# Patient Record
Sex: FEMALE | Race: WHITE | NOT HISPANIC OR LATINO | URBAN - METROPOLITAN AREA
[De-identification: names, ages, dates, MRNs, and addresses within clinical notes are randomized per-mention and may not be internally consistent; named-entity substitution may affect disease eponyms.]

---

## 2017-09-11 ENCOUNTER — EMERGENCY (EMERGENCY)
Facility: HOSPITAL | Age: 27
LOS: 1 days | Discharge: HOME | End: 2017-09-11
Attending: EMERGENCY MEDICINE | Admitting: EMERGENCY MEDICINE
Payer: COMMERCIAL

## 2017-09-11 VITALS
WEIGHT: 169.09 LBS | SYSTOLIC BLOOD PRESSURE: 145 MMHG | DIASTOLIC BLOOD PRESSURE: 94 MMHG | TEMPERATURE: 97 F | RESPIRATION RATE: 18 BRPM | HEIGHT: 66 IN | OXYGEN SATURATION: 99 % | HEART RATE: 102 BPM

## 2017-09-11 DIAGNOSIS — Z88.8 ALLERGY STATUS TO OTHER DRUGS, MEDICAMENTS AND BIOLOGICAL SUBSTANCES: ICD-10-CM

## 2017-09-11 DIAGNOSIS — F10.129 ALCOHOL ABUSE WITH INTOXICATION, UNSPECIFIED: ICD-10-CM

## 2017-09-11 DIAGNOSIS — R00.2 PALPITATIONS: ICD-10-CM

## 2017-09-11 PROCEDURE — 93010 ELECTROCARDIOGRAM REPORT: CPT

## 2017-09-11 PROCEDURE — 93005 ELECTROCARDIOGRAM TRACING: CPT

## 2017-09-11 PROCEDURE — 99284 EMERGENCY DEPT VISIT MOD MDM: CPT | Mod: 25

## 2017-09-11 PROCEDURE — 99285 EMERGENCY DEPT VISIT HI MDM: CPT | Mod: 25

## 2017-09-11 NOTE — ED ADULT NURSE NOTE - OBJECTIVE STATEMENT
25 y/o female c/o fast heart beat while at a baseball game tonight. pt reports I felt my heart beating fast and I was SOB, my friend ahs a portabole heart monitor and it said by heart rate was 130. pt resting comfortably in stretcher now, EKG, on continuous cardiac monitor with HR 85. pt had 5 alcoholic drinks prior to arrival. denies n/v/d, LOC, headache, dizziness, fever/chills, numbness/tingling, syncope.

## 2017-09-11 NOTE — ED ADULT TRIAGE NOTE - ARRIVAL INFO ADDITIONAL COMMENTS
Pt c/o rapid heart rate. She states when she checked it with her own device it was 132. In triage it is currently 102. Pt denies sob or chest pain.

## 2017-09-12 VITALS
HEART RATE: 88 BPM | RESPIRATION RATE: 18 BRPM | TEMPERATURE: 97 F | SYSTOLIC BLOOD PRESSURE: 122 MMHG | OXYGEN SATURATION: 100 % | DIASTOLIC BLOOD PRESSURE: 78 MMHG

## 2017-09-12 RX ORDER — SODIUM CHLORIDE 9 MG/ML
1000 INJECTION INTRAMUSCULAR; INTRAVENOUS; SUBCUTANEOUS ONCE
Qty: 0 | Refills: 0 | Status: DISCONTINUED | OUTPATIENT
Start: 2017-09-12 | End: 2017-09-12

## 2017-09-12 NOTE — ED PROVIDER NOTE - ATTENDING CONTRIBUTION TO CARE
25yo M here w/ palpitations in setting of etoh use today. has happened before, already has a cardiologist, had ekg/echo as outpatient that were normal. feels better in ED, had drank water which resolved it. no signs of arrthymia here. normal vs. DC home in NAD with strict return precautions given to f/u his cardiologist

## 2017-09-12 NOTE — ED PROVIDER NOTE - MEDICAL DECISION MAKING DETAILS
etoh intox but clinically sober, ambulated w/steady gait, drinking water, states that was having palpitations earlier but resolved w/po fluids, wanting to go home, fs stable, no signs of trauma , hemodynamically stable

## 2017-09-12 NOTE — ED PROVIDER NOTE - OBJECTIVE STATEMENT
The  pt is a 25 y/o F, who presents to ED, stating "I was out drinking and my heart started racing" earlier. Pt states that had 4-5 cocktails, did not drink water and started to notice her HR to be fast, has drank water since and now feeling better.  Incidentally states that has seen cards and had a normal echo and ekg 2 mon ago. Denies drug use, cp, sob, n/v/d, h/a

## 2018-05-03 ENCOUNTER — EMERGENCY (EMERGENCY)
Facility: HOSPITAL | Age: 28
LOS: 1 days | Discharge: HOME | End: 2018-05-03
Attending: EMERGENCY MEDICINE | Admitting: EMERGENCY MEDICINE
Payer: COMMERCIAL

## 2018-05-03 VITALS
DIASTOLIC BLOOD PRESSURE: 79 MMHG | SYSTOLIC BLOOD PRESSURE: 149 MMHG | OXYGEN SATURATION: 98 % | HEART RATE: 94 BPM | RESPIRATION RATE: 20 BRPM | TEMPERATURE: 99 F

## 2018-05-03 DIAGNOSIS — F10.10 ALCOHOL ABUSE, UNCOMPLICATED: ICD-10-CM

## 2018-05-03 DIAGNOSIS — R06.02 SHORTNESS OF BREATH: ICD-10-CM

## 2018-05-03 DIAGNOSIS — R00.2 PALPITATIONS: ICD-10-CM

## 2018-05-03 DIAGNOSIS — Z88.8 ALLERGY STATUS TO OTHER DRUGS, MEDICAMENTS AND BIOLOGICAL SUBSTANCES: ICD-10-CM

## 2018-05-03 DIAGNOSIS — R50.9 FEVER, UNSPECIFIED: ICD-10-CM

## 2018-05-03 DIAGNOSIS — Z79.899 OTHER LONG TERM (CURRENT) DRUG THERAPY: ICD-10-CM

## 2018-05-03 DIAGNOSIS — F41.9 ANXIETY DISORDER, UNSPECIFIED: ICD-10-CM

## 2018-05-03 DIAGNOSIS — Z98.890 OTHER SPECIFIED POSTPROCEDURAL STATES: Chronic | ICD-10-CM

## 2018-05-03 PROCEDURE — 93010 ELECTROCARDIOGRAM REPORT: CPT

## 2018-05-03 PROCEDURE — 99284 EMERGENCY DEPT VISIT MOD MDM: CPT | Mod: 25

## 2018-05-03 RX ORDER — IBUPROFEN 200 MG
600 TABLET ORAL ONCE
Qty: 0 | Refills: 0 | Status: COMPLETED | OUTPATIENT
Start: 2018-05-03 | End: 2018-05-03

## 2018-05-03 RX ORDER — CLONAZEPAM 1 MG
0 TABLET ORAL
Qty: 0 | Refills: 0 | COMMUNITY

## 2018-05-03 RX ADMIN — SODIUM CHLORIDE 1000 MILLILITER(S): 9 INJECTION INTRAMUSCULAR; INTRAVENOUS; SUBCUTANEOUS at 23:30

## 2018-05-03 RX ADMIN — Medication 600 MILLIGRAM(S): at 23:47

## 2018-05-03 NOTE — ED PROVIDER NOTE - ATTENDING CONTRIBUTION TO CARE
27 yof pw feeling sob and palpitations x a few days.  reports fhx of afib.  pt concern for afib.  pt states increased etoh use and klonipin use 2/2 stress at work but no si or hi.    agree w/ PA, pt ao x 3 but tangential during interview, will check labs, etoh level, screening ekg, reassess    will check CTA given elevated dimer

## 2018-05-03 NOTE — ED ADULT NURSE NOTE - OBJECTIVE STATEMENT
Pt states " I had palpitations and I called my mother's cardiologist (who I saw once and was told that my  heart is perfect), who told me he thinks I am having a panic attack and I should come to the ER." Pt with AOB, slurred speech, A+O x 3, no neurological deficits noted. Denies CP but reports SOB x 2 days. No SOB noted at time of exam. Pt states she is on antibiotics for upper respiratory infection.  Pt admits to drinking but states "I only had one beer at 1:30pm". Safety precautions in place.

## 2018-05-03 NOTE — ED PROVIDER NOTE - CARE PLAN
Principal Discharge DX:	Anxiety  Secondary Diagnosis:	Shortness of breath Principal Discharge DX:	Alcohol abuse  Secondary Diagnosis:	Shortness of breath  Secondary Diagnosis:	Anxiety

## 2018-05-03 NOTE — ED ADULT NURSE NOTE - CHPI ED SYMPTOMS NEG
no fever/no cough/no vomiting/no chest pain/no chills/no dizziness/no back pain/no shortness of breath/no nausea/no syncope/no diaphoresis

## 2018-05-03 NOTE — ED PROVIDER NOTE - PHYSICAL EXAMINATION
somewhat difficult to follow patient in conversation secondary to repeated back to back  tangential conversations, make uop smeared upon patients face and some slurring of speech ( unclear if Klonopin / etoh related or patients baseline conversational style ) no indicators of any other deficits + A&O x 3

## 2018-05-03 NOTE — ED PROVIDER NOTE - OBJECTIVE STATEMENT
patient reports short duration of SOB tonight marred by recent palpitations days / ? weeks and concerns about a fib as mother has a fib + seeking cardiology follow up as well as states on antibiotics for ?uri pathology / toothache and was at another ED today and left after partial work up + History marred by patient's tangential conversation and no clear course towards tonights chief compliant but rather a myriad of complaints within a context of work  related stress and klonopin use / etoh both espoused for tonight perhaps as well marring a clear picture

## 2018-05-03 NOTE — ED PROVIDER NOTE - MEDICAL DECISION MAKING DETAILS
secondary to unclear nature of current complaint and history full set labs and IVF commenced with annalgesics as requested by patient EKG completed secondary to unclear nature of current complaint and history full set labs and IVF commenced with analgesics as requested by patient EKG completed + d dimer and on OCP -> CTA neg for PE + counseled on etoh overuse and mother with patient at d/c aware this likely primary concern + alert and coordinated at d/c

## 2018-05-04 VITALS
DIASTOLIC BLOOD PRESSURE: 60 MMHG | OXYGEN SATURATION: 98 % | TEMPERATURE: 98 F | HEART RATE: 96 BPM | RESPIRATION RATE: 17 BRPM | SYSTOLIC BLOOD PRESSURE: 101 MMHG

## 2018-05-04 PROCEDURE — 80053 COMPREHEN METABOLIC PANEL: CPT

## 2018-05-04 PROCEDURE — 81003 URINALYSIS AUTO W/O SCOPE: CPT

## 2018-05-04 PROCEDURE — 82553 CREATINE MB FRACTION: CPT

## 2018-05-04 PROCEDURE — 99283 EMERGENCY DEPT VISIT LOW MDM: CPT | Mod: 25

## 2018-05-04 PROCEDURE — 71275 CT ANGIOGRAPHY CHEST: CPT | Mod: 26

## 2018-05-04 PROCEDURE — 93005 ELECTROCARDIOGRAM TRACING: CPT

## 2018-05-04 PROCEDURE — 71275 CT ANGIOGRAPHY CHEST: CPT

## 2018-05-04 PROCEDURE — 82550 ASSAY OF CK (CPK): CPT

## 2018-05-04 PROCEDURE — 85379 FIBRIN DEGRADATION QUANT: CPT

## 2018-05-04 PROCEDURE — 84484 ASSAY OF TROPONIN QUANT: CPT

## 2018-05-04 PROCEDURE — 85025 COMPLETE CBC W/AUTO DIFF WBC: CPT

## 2018-05-04 PROCEDURE — 83735 ASSAY OF MAGNESIUM: CPT

## 2018-05-04 PROCEDURE — 36415 COLL VENOUS BLD VENIPUNCTURE: CPT

## 2018-05-04 PROCEDURE — 80307 DRUG TEST PRSMV CHEM ANLYZR: CPT

## 2018-05-04 RX ORDER — SODIUM CHLORIDE 9 MG/ML
1000 INJECTION INTRAMUSCULAR; INTRAVENOUS; SUBCUTANEOUS ONCE
Qty: 0 | Refills: 0 | Status: COMPLETED | OUTPATIENT
Start: 2018-05-04 | End: 2018-05-03

## 2018-05-04 RX ORDER — SODIUM CHLORIDE 9 MG/ML
1000 INJECTION INTRAMUSCULAR; INTRAVENOUS; SUBCUTANEOUS ONCE
Qty: 0 | Refills: 0 | Status: COMPLETED | OUTPATIENT
Start: 2018-05-04 | End: 2018-05-04

## 2018-05-04 RX ORDER — TAMSULOSIN HYDROCHLORIDE 0.4 MG/1
0.4 CAPSULE ORAL ONCE
Qty: 0 | Refills: 0 | Status: DISCONTINUED | OUTPATIENT
Start: 2018-05-04 | End: 2018-05-04

## 2018-05-04 RX ADMIN — Medication 600 MILLIGRAM(S): at 00:31

## 2018-05-04 RX ADMIN — SODIUM CHLORIDE 1000 MILLILITER(S): 9 INJECTION INTRAMUSCULAR; INTRAVENOUS; SUBCUTANEOUS at 00:45

## 2018-05-04 NOTE — ED ADULT NURSE REASSESSMENT NOTE - NS ED NURSE REASSESS COMMENT FT1
Pt resting. calm, in stable condition. Denies CP/Papitations at this time. CT scan completed. Awaiting further eval and dispo. Close monitoring continues.

## 2018-06-29 ENCOUNTER — EMERGENCY (EMERGENCY)
Facility: HOSPITAL | Age: 28
LOS: 1 days | Discharge: LEFT BEFORE DISCHARGE | End: 2018-06-29
Attending: EMERGENCY MEDICINE | Admitting: EMERGENCY MEDICINE
Payer: COMMERCIAL

## 2018-06-29 VITALS
OXYGEN SATURATION: 99 % | TEMPERATURE: 98 F | DIASTOLIC BLOOD PRESSURE: 90 MMHG | HEART RATE: 112 BPM | SYSTOLIC BLOOD PRESSURE: 135 MMHG | RESPIRATION RATE: 18 BRPM | WEIGHT: 169.98 LBS

## 2018-06-29 DIAGNOSIS — R11.2 NAUSEA WITH VOMITING, UNSPECIFIED: ICD-10-CM

## 2018-06-29 DIAGNOSIS — R10.31 RIGHT LOWER QUADRANT PAIN: ICD-10-CM

## 2018-06-29 DIAGNOSIS — Z88.8 ALLERGY STATUS TO OTHER DRUGS, MEDICAMENTS AND BIOLOGICAL SUBSTANCES: ICD-10-CM

## 2018-06-29 DIAGNOSIS — R11.10 VOMITING, UNSPECIFIED: ICD-10-CM

## 2018-06-29 DIAGNOSIS — R14.0 ABDOMINAL DISTENSION (GASEOUS): ICD-10-CM

## 2018-06-29 DIAGNOSIS — Z98.890 OTHER SPECIFIED POSTPROCEDURAL STATES: Chronic | ICD-10-CM

## 2018-06-29 DIAGNOSIS — K59.00 CONSTIPATION, UNSPECIFIED: ICD-10-CM

## 2018-06-29 DIAGNOSIS — Z79.899 OTHER LONG TERM (CURRENT) DRUG THERAPY: ICD-10-CM

## 2018-06-29 PROCEDURE — 99283 EMERGENCY DEPT VISIT LOW MDM: CPT | Mod: 25

## 2018-06-29 PROCEDURE — 99282 EMERGENCY DEPT VISIT SF MDM: CPT

## 2018-06-29 RX ORDER — ONDANSETRON 8 MG/1
4 TABLET, FILM COATED ORAL ONCE
Qty: 0 | Refills: 0 | Status: DISCONTINUED | OUTPATIENT
Start: 2018-06-29 | End: 2018-07-03

## 2018-06-29 RX ORDER — FAMOTIDINE 10 MG/ML
20 INJECTION INTRAVENOUS ONCE
Qty: 0 | Refills: 0 | Status: DISCONTINUED | OUTPATIENT
Start: 2018-06-29 | End: 2018-07-03

## 2018-06-29 RX ORDER — SODIUM CHLORIDE 9 MG/ML
1000 INJECTION INTRAMUSCULAR; INTRAVENOUS; SUBCUTANEOUS ONCE
Qty: 0 | Refills: 0 | Status: DISCONTINUED | OUTPATIENT
Start: 2018-06-29 | End: 2018-07-03

## 2018-06-29 NOTE — ED ADULT NURSE NOTE - CHPI ED SYMPTOMS NEG
no abdominal distension/no hematuria/no blood in stool/no chills/no diarrhea/no fever/no burning urination/no dysuria

## 2018-06-29 NOTE — ED ADULT NURSE REASSESSMENT NOTE - NS ED NURSE REASSESS COMMENT FT1
After evaluation by PA, pt refuses blood draw, and prefers to follow up w/ primary care and her GI doctors. Pt. has no IV ernie, gathers her belongings and leaves the ED at this time. Pt ambulates steady

## 2018-06-29 NOTE — ED ADULT TRIAGE NOTE - ARRIVAL INFO ADDITIONAL COMMENTS
pt c/o n/v since march intermittently and then it worsened and has had pain.  today pt developed rlq pain with worsening vomiting.

## 2018-06-30 NOTE — ED PROVIDER NOTE - OBJECTIVE STATEMENT
28 yo female in the Er c/o nausea, vomiting and abdominal pain. Pt reports she was at the [employer] and felt very nauseous , vomit multiple times, also c/o lower abdominal pain, mostly to her right side. Pt reports she has chronic issues with her GI tract- frequent vomiting and frequent abdominal pain, frequent constipations. Pt has BM usually once a week. Pt denies h/o ovarian cyst, denies any irregular menstruation, pt didn't f/u with any GI doctor yet, but had multiple urgent care visits and ER visits for the same complaints.

## 2018-06-30 NOTE — ED PROVIDER NOTE - MEDICAL DECISION MAKING DETAILS
28 yo female in the Er due to constipations, abdominal pain, s/p multiple episodes of vomiting tonight. pt mentioned that her symptoms started few month ago. Plan : labs, IVF, antiemetics, mostlikely will need CT, as abdomen significantly tender to RLQ.

## 2018-12-20 NOTE — ED PROVIDER NOTE - TOBACCO USE
Received patient today alert and oriented x3 and is having minimal pain. Pain medication given as ordered. Abdominal incision CDI, dressing in place. Patient tolerating full liquid diet today having minimal nausea. Diet advanced to low fiber.  patient tere Unknown if ever smoked

## 2020-02-01 ENCOUNTER — EMERGENCY (EMERGENCY)
Facility: HOSPITAL | Age: 30
LOS: 1 days | Discharge: HOME | End: 2020-02-01
Admitting: EMERGENCY MEDICINE

## 2020-02-01 VITALS
TEMPERATURE: 98 F | OXYGEN SATURATION: 99 % | HEIGHT: 65 IN | SYSTOLIC BLOOD PRESSURE: 146 MMHG | RESPIRATION RATE: 18 BRPM | WEIGHT: 169.98 LBS | DIASTOLIC BLOOD PRESSURE: 95 MMHG | HEART RATE: 86 BPM
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
## 2020-02-01 DIAGNOSIS — Z98.890 OTHER SPECIFIED POSTPROCEDURAL STATES: Chronic | ICD-10-CM
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
## 2020-02-01 DIAGNOSIS — S09.90XA UNSPECIFIED INJURY OF HEAD, INITIAL ENCOUNTER: ICD-10-CM
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
## 2020-02-01 NOTE — ED ADULT TRIAGE NOTE - CHIEF COMPLAINT QUOTE
s/p mechanical fall at 3:30pm today while ice skating, states fell on right side of head on synthetic ice, denies LOC or blood thinners, no nausea/vomititng, no neuro defiicts, hematoma noted to right side of head, ice pack applied and Alleve taken prior to arrival to ED.

## 2020-02-01 NOTE — ED ADULT NURSE REASSESSMENT NOTE - NS ED NURSE REASSESS COMMENT FT1
Patient after ED triage was all set to go inside [facility] when started arguing w/ her mother, stated did not want her mother to come in ED with her.  Mother insisted on accompanying patient to ED so patient seen walking out of ED from triage.  Attempts made to locate patient.  Patient left in stable condition, vital signs stable.

## 2020-02-02 PROBLEM — F41.9 ANXIETY DISORDER, UNSPECIFIED: Chronic | Status: ACTIVE | Noted: 2018-05-03

## 2020-03-18 ENCOUNTER — INPATIENT (INPATIENT)
Facility: HOSPITAL | Age: 30
LOS: 8 days | Discharge: HOME | DRG: 870 | End: 2020-03-27
Attending: HOSPITALIST | Admitting: INTERNAL MEDICINE
Payer: COMMERCIAL

## 2020-03-18 VITALS
TEMPERATURE: 101 F | RESPIRATION RATE: 20 BRPM | SYSTOLIC BLOOD PRESSURE: 95 MMHG | DIASTOLIC BLOOD PRESSURE: 58 MMHG | HEART RATE: 105 BPM | OXYGEN SATURATION: 100 % | WEIGHT: 139.99 LBS

## 2020-03-18 DIAGNOSIS — B97.29 OTHER CORONAVIRUS AS THE CAUSE OF DISEASES CLASSIFIED ELSEWHERE: ICD-10-CM

## 2020-03-18 DIAGNOSIS — Z78.1 PHYSICAL RESTRAINT STATUS: ICD-10-CM

## 2020-03-18 DIAGNOSIS — J12.89 OTHER VIRAL PNEUMONIA: ICD-10-CM

## 2020-03-18 DIAGNOSIS — J96.01 ACUTE RESPIRATORY FAILURE WITH HYPOXIA: ICD-10-CM

## 2020-03-18 DIAGNOSIS — F41.9 ANXIETY DISORDER, UNSPECIFIED: ICD-10-CM

## 2020-03-18 DIAGNOSIS — J10.1 INFLUENZA DUE TO OTHER IDENTIFIED INFLUENZA VIRUS WITH OTHER RESPIRATORY MANIFESTATIONS: ICD-10-CM

## 2020-03-18 DIAGNOSIS — Z98.890 OTHER SPECIFIED POSTPROCEDURAL STATES: Chronic | ICD-10-CM

## 2020-03-18 DIAGNOSIS — Z88.1 ALLERGY STATUS TO OTHER ANTIBIOTIC AGENTS STATUS: ICD-10-CM

## 2020-03-18 DIAGNOSIS — Z87.01 PERSONAL HISTORY OF PNEUMONIA (RECURRENT): ICD-10-CM

## 2020-03-18 DIAGNOSIS — Z79.899 OTHER LONG TERM (CURRENT) DRUG THERAPY: ICD-10-CM

## 2020-03-18 DIAGNOSIS — A41.9 SEPSIS, UNSPECIFIED ORGANISM: ICD-10-CM

## 2020-03-18 DIAGNOSIS — A41.89 OTHER SPECIFIED SEPSIS: ICD-10-CM

## 2020-03-18 LAB
CRP SERPL-MCNC: 34.97 MG/DL — HIGH (ref 0–0.4)
ERYTHROCYTE [SEDIMENTATION RATE] IN BLOOD: 20 MM/HR — HIGH
FERRITIN SERPL-MCNC: 407 NG/ML — HIGH (ref 15–150)
GAS PNL BLDV: SIGNIFICANT CHANGE UP
GLUCOSE BLDC GLUCOMTR-MCNC: 93 MG/DL — SIGNIFICANT CHANGE UP (ref 70–99)
LDH SERPL L TO P-CCNC: 372 U/L — HIGH (ref 50–242)
PROCALCITONIN SERPL-MCNC: 9.62 NG/ML — HIGH (ref 0.02–0.1)
RAPID RVP RESULT: SIGNIFICANT CHANGE UP
TRIGL SERPL-MCNC: 136 MG/DL — SIGNIFICANT CHANGE UP (ref 10–149)

## 2020-03-18 PROCEDURE — 71045 X-RAY EXAM CHEST 1 VIEW: CPT | Mod: 26

## 2020-03-18 PROCEDURE — 99291 CRITICAL CARE FIRST HOUR: CPT

## 2020-03-18 RX ORDER — VANCOMYCIN HCL 1 G
1000 VIAL (EA) INTRAVENOUS ONCE
Refills: 0 | Status: COMPLETED | OUTPATIENT
Start: 2020-03-18 | End: 2020-03-18

## 2020-03-18 RX ORDER — DEXTROSE 50 % IN WATER 50 %
25 SYRINGE (ML) INTRAVENOUS ONCE
Refills: 0 | Status: DISCONTINUED | OUTPATIENT
Start: 2020-03-18 | End: 2020-03-26

## 2020-03-18 RX ORDER — DEXTROSE 50 % IN WATER 50 %
15 SYRINGE (ML) INTRAVENOUS ONCE
Refills: 0 | Status: DISCONTINUED | OUTPATIENT
Start: 2020-03-18 | End: 2020-03-26

## 2020-03-18 RX ORDER — SODIUM CHLORIDE 9 MG/ML
1000 INJECTION INTRAMUSCULAR; INTRAVENOUS; SUBCUTANEOUS ONCE
Refills: 0 | Status: COMPLETED | OUTPATIENT
Start: 2020-03-18 | End: 2020-03-18

## 2020-03-18 RX ORDER — SODIUM CHLORIDE 9 MG/ML
1000 INJECTION, SOLUTION INTRAVENOUS
Refills: 0 | Status: DISCONTINUED | OUTPATIENT
Start: 2020-03-18 | End: 2020-03-26

## 2020-03-18 RX ORDER — CHLORHEXIDINE GLUCONATE 213 G/1000ML
1 SOLUTION TOPICAL
Refills: 0 | Status: DISCONTINUED | OUTPATIENT
Start: 2020-03-18 | End: 2020-03-25

## 2020-03-18 RX ORDER — PIPERACILLIN AND TAZOBACTAM 4; .5 G/20ML; G/20ML
3.38 INJECTION, POWDER, LYOPHILIZED, FOR SOLUTION INTRAVENOUS ONCE
Refills: 0 | Status: COMPLETED | OUTPATIENT
Start: 2020-03-18 | End: 2020-03-18

## 2020-03-18 RX ORDER — HYDROXYCHLOROQUINE SULFATE 200 MG
400 TABLET ORAL EVERY 12 HOURS
Refills: 0 | Status: COMPLETED | OUTPATIENT
Start: 2020-03-18 | End: 2020-03-20

## 2020-03-18 RX ORDER — ONDANSETRON 8 MG/1
4 TABLET, FILM COATED ORAL ONCE
Refills: 0 | Status: COMPLETED | OUTPATIENT
Start: 2020-03-18 | End: 2020-03-18

## 2020-03-18 RX ORDER — ENOXAPARIN SODIUM 100 MG/ML
40 INJECTION SUBCUTANEOUS EVERY 24 HOURS
Refills: 0 | Status: DISCONTINUED | OUTPATIENT
Start: 2020-03-18 | End: 2020-03-27

## 2020-03-18 RX ORDER — TOCILIZUMAB 20 MG/ML
500 INJECTION, SOLUTION, CONCENTRATE INTRAVENOUS ONCE
Refills: 0 | Status: COMPLETED | OUTPATIENT
Start: 2020-03-18 | End: 2020-03-18

## 2020-03-18 RX ORDER — GLUCAGON INJECTION, SOLUTION 0.5 MG/.1ML
1 INJECTION, SOLUTION SUBCUTANEOUS ONCE
Refills: 0 | Status: DISCONTINUED | OUTPATIENT
Start: 2020-03-18 | End: 2020-03-26

## 2020-03-18 RX ORDER — ALBUTEROL 90 UG/1
2 AEROSOL, METERED ORAL ONCE
Refills: 0 | Status: COMPLETED | OUTPATIENT
Start: 2020-03-18 | End: 2020-03-18

## 2020-03-18 RX ORDER — ACETAMINOPHEN 500 MG
1000 TABLET ORAL ONCE
Refills: 0 | Status: COMPLETED | OUTPATIENT
Start: 2020-03-18 | End: 2020-03-18

## 2020-03-18 RX ORDER — DEXTROSE 50 % IN WATER 50 %
12.5 SYRINGE (ML) INTRAVENOUS ONCE
Refills: 0 | Status: DISCONTINUED | OUTPATIENT
Start: 2020-03-18 | End: 2020-03-26

## 2020-03-18 RX ORDER — HYDROXYCHLOROQUINE SULFATE 200 MG
200 TABLET ORAL EVERY 12 HOURS
Refills: 0 | Status: DISCONTINUED | OUTPATIENT
Start: 2020-03-19 | End: 2020-03-20

## 2020-03-18 RX ORDER — INFLUENZA VIRUS VACCINE 15; 15; 15; 15 UG/.5ML; UG/.5ML; UG/.5ML; UG/.5ML
0.5 SUSPENSION INTRAMUSCULAR ONCE
Refills: 0 | Status: DISCONTINUED | OUTPATIENT
Start: 2020-03-18 | End: 2020-03-27

## 2020-03-18 RX ORDER — PIPERACILLIN AND TAZOBACTAM 4; .5 G/20ML; G/20ML
3.38 INJECTION, POWDER, LYOPHILIZED, FOR SOLUTION INTRAVENOUS EVERY 6 HOURS
Refills: 0 | Status: DISCONTINUED | OUTPATIENT
Start: 2020-03-19 | End: 2020-03-23

## 2020-03-18 RX ORDER — INSULIN LISPRO 100/ML
VIAL (ML) SUBCUTANEOUS EVERY 6 HOURS
Refills: 0 | Status: DISCONTINUED | OUTPATIENT
Start: 2020-03-18 | End: 2020-03-25

## 2020-03-18 RX ADMIN — Medication 250 MILLIGRAM(S): at 18:45

## 2020-03-18 RX ADMIN — PIPERACILLIN AND TAZOBACTAM 200 GRAM(S): 4; .5 INJECTION, POWDER, LYOPHILIZED, FOR SOLUTION INTRAVENOUS at 18:05

## 2020-03-18 RX ADMIN — ALBUTEROL 2 PUFF(S): 90 AEROSOL, METERED ORAL at 16:55

## 2020-03-18 RX ADMIN — SODIUM CHLORIDE 1000 MILLILITER(S): 9 INJECTION INTRAMUSCULAR; INTRAVENOUS; SUBCUTANEOUS at 18:40

## 2020-03-18 RX ADMIN — Medication 1000 MILLIGRAM(S): at 17:09

## 2020-03-18 RX ADMIN — Medication 1000 MILLIGRAM(S): at 18:45

## 2020-03-18 RX ADMIN — TOCILIZUMAB 100 MILLIGRAM(S): 20 INJECTION, SOLUTION, CONCENTRATE INTRAVENOUS at 20:50

## 2020-03-18 RX ADMIN — ENOXAPARIN SODIUM 40 MILLIGRAM(S): 100 INJECTION SUBCUTANEOUS at 20:59

## 2020-03-18 NOTE — ED PROVIDER NOTE - OBJECTIVE STATEMENT
28 y/o F pt with no pertinent PMHx and PSHx presents to ED c/o shortness of breath, chest tightness, fever, generalized malaise, and fatigue since last week. Pt states she was seen at urgent care 3 days ago and was diagnosed with flu and pneumonia at that time; pt was started on Tamiflu and Levaquin. Since then, pt relates concern over worsening symptoms and no improvement despite treatment. Pt denies any other acute complaints. 28 y/o F pt with  h/o  pneumonia  in the past ( about 4-5 times)  presents to ED c/o shortness of breath, chest tightness, fever, generalized malaise, and fatigue since last week. Pt states she was seen at the urgent care 3 days ago and was diagnosed with flu A and pneumonia. pt started on Tamiflu and Levaquin, Albuterol nebs. Since then, pt relates concern over worsening symptoms and no improvement despite treatment. Temperature spiked high today, as well as breathing became more labored. Pt denies any other acute complaints.

## 2020-03-18 NOTE — ED PROVIDER NOTE - CLINICAL SUMMARY MEDICAL DECISION MAKING FREE TEXT BOX
28 y/o F pt presents to ED with recent flu and pneumonia diagnosis with worsening symptoms. Pt febrile, in respiratory distress, and is tachypneic in ED, sepsis work up done, CXR pending, and ICU called for consult. Clinical finding suspicious for COVID-19 infection/ pneumonia. 30 y/o F recently diagnosed with Flu-A+ and pneumonia  presents to ED with worsening symptoms. Pt febrile, ill appearing, tachypneic, initial O2 sat-91% on RA, labored breathing+. Sepsis work up done, CXR -b/l patchy pneumonia, concerning for COVID-19. ICU called for consult. pt seen and evaluated by ICU team, admission to MICU recommended for further management.

## 2020-03-18 NOTE — H&P ADULT - HISTORY OF PRESENT ILLNESS
Ms. Gudino is a 29 year old female with a past medical history of recurrent pneumonia who presents with fever, cough, and shortness of breath. On 3/16, she was seen by [facility] for fever, chills, and nonproductive cough. She was Flu-A positive and was noted to have a RUL consolidation on CXR. In addition, she was swabbed for COVID at this time. She was sent home on tamilfu, levaquin, and albuterol, and she was instructed to self-quarantine at home. Her myalgias and chills improved, but continued to have fevers as high as 103 oral today (3/18), which prompted her to report to [facility] ED. She denies any sick/COVID-positive contacts, recent travel,  underlying lung or autoimmune disease, or significant smoking history. She denies chest pain, headaches, diarrhea, vomiting, or abdominal pain. She states her shortness of breath has worsened since arriving in ED. She did not receive the influenza vaccine due to unclear reaction in past.     In the ED, her vital signs were notable for a fever to 100.6 F, BP of 95/58, , RR of 20, and a O2 saturation of 100% on rom air. CBC was notable for a bandemia without elevation in WBC(8.29). VBG showed pH of 7.29, pCO2 of 37, and pO2 of 19, with HCO3 of 18. CXR showed diffuse pulmonary infiltrates on the right and patchy infiltrates. She was given one dose of levaquin 750 mg, APAP 1 g, Vancomycin 1 g, Zosyn 3.375 g, and 1 L of NaCL. She was admitted to the MICU for acute hypoxic respiratory distress 2/2 influenza A vs. post-viral PNA vs. COVID infection.

## 2020-03-18 NOTE — H&P ADULT - NSHPLABSRESULTS_GEN_ALL_CORE
LABS:                         13.1   8.29  )-----------( 226      ( 18 Mar 2020 16:39 )             39.9     03-18    135  |  102  |  14  ----------------------------<  123<H>  3.8   |  19<L>  |  1.02    Ca    8.6      18 Mar 2020 16:39    TPro  6.0  /  Alb  3.0<L>  /  TBili  0.4  /  DBili  x   /  AST  40  /  ALT  31  /  AlkPhos  46  03-18    PT/INR - ( 18 Mar 2020 16:39 )   PT: 15.7 sec;   INR: 1.36          PTT - ( 18 Mar 2020 16:39 )  PTT:36.2 sec          Lactate, Blood: 1.8 mmol/L (03-18 @ 16:38)      RADIOLOGY, EKG & ADDITIONAL TESTS: Reviewed.

## 2020-03-18 NOTE — H&P ADULT - NSHPPHYSICALEXAM_GEN_ALL_CORE
VITAL SIGNS:  T(C): 37.4 (03-18-20 @ 18:40), Max: 39.4 (03-18-20 @ 16:40)  T(F): 99.3 (03-18-20 @ 18:40), Max: 102.9 (03-18-20 @ 16:40)  HR: 99 (03-18-20 @ 18:40) (90 - 105)  BP: 101/59 (03-18-20 @ 18:40) (95/58 - 111/59)  RR: 20 (03-18-20 @ 18:40) (20 - 22)  SpO2: 99% (03-18-20 @ 18:40) (97% - 100%)    PHYSICAL EXAM:    Constitutional: WDWN, lying comfortably in bed, NAD  Head: Nc/At  Eyes: PERRL, EOMI, clear conjunctiva  ENT: no nasal discharge; uvula midline, no oropharyngeal erythema or exudates; MMM  Neck: supple; no JVD or thyromegaly  Respiratory: CTA b/l, no wheezes, rales, or rhonchi  Cardiac: +S1/S2, +RRR, no murmurs, rubs, or gallops  Gastrointestinal: soft, non-tender, non-distended, no rebound/guarding, no palpable masses, normoactive bowel sounds x4  Back: spine midline, no bony tenderness or step-offs; no CVAT B/L  Extremities: WWP, no clubbing or cyanosis, no peripheral edema  Musculoskeletal: NROM x4; no joint swelling, tenderness or erythema  Vascular: 2+ radial and DP pulses b/l  Dermatologic: skin warm, dry and intact, no rashes, wounds, or scars  Lymphatic: no submandibular or cervical LAD  Neurologic: AAOx3, CNII-XII grossly intact, no focal deficits  Psychiatric: affect and characteristics of appearance, verbalizations, behaviors are appropriate VITAL SIGNS:  T(C): 37.4 (03-18-20 @ 18:40), Max: 39.4 (03-18-20 @ 16:40)  T(F): 99.3 (03-18-20 @ 18:40), Max: 102.9 (03-18-20 @ 16:40)  HR: 99 (03-18-20 @ 18:40) (90 - 105)  BP: 101/59 (03-18-20 @ 18:40) (95/58 - 111/59)  RR: 20 (03-18-20 @ 18:40) (20 - 22)  SpO2: 99% (03-18-20 @ 18:40) (97% - 100%)    PHYSICAL EXAM:    Constitutional: Sitting in bed with nonbreather, mild distress  HEENT: Nc/At, EOMI, PERRLA, no scleral icterus, MMM  Neck: supple; no JVD or thyromegaly  Respiratory: Diminished breath sounds at right base and right middle lobe with crackles, decreased breath sounds on left,  no wheezing, increased work of breathing     Cardiac: +S1/S2, +RRR, no murmurs, rubs, or gallops  Gastrointestinal: soft, non-tender, non-distended, normoactive bowel sounds  Extremities: WWP, no clubbing or cyanosis, no peripheral edema  Musculoskeletal: NROM x4; no joint swelling, tenderness or erythema  Vascular: 2+ radial and DP pulses b/l  Dermatologic: skin warm, dry and intact, no rashes, wounds, or scars  Lymphatic: no submandibular or cervical LAD  Neurologic: AAOx3, CNII-XII grossly intact, no focal deficits  Psychiatric: affect and characteristics of appearance, verbalizations, behaviors are appropriate

## 2020-03-18 NOTE — ED PROVIDER NOTE - RESPIRATORY, MLM
Tachypneic, labored breathing, bilateral rales, no wheezes. Tachypneic, labored breathing, bilateral rales, some wheezes.

## 2020-03-18 NOTE — H&P ADULT - ASSESSMENT
Ms. Gudino is a 29 year old female with a past medical history of recurrent pneumonia who presents with fever, cough, and shortness of breath. She was admitted to the MICU for acute hypoxic respiratory distress 2/2 influenza A vs. post-viral PNA vs. COVID infection. Ms. Gudino is a 29 year old female with a past medical history of recurrent pneumonia who presents with fever, cough, and shortness of breath. She was admitted to the MICU for acute hypoxic respiratory distress 2/2 influenza A vs. post-viral PNA vs. COVID infection.    Pulmonary:  #Sepsis 2/2 COVID+  - on arrival meeting 3/4 SIRS criteria (RUL and possible LLL) with pulmonary source  - WBC  with % neutrophils,  % lymphocytes, lactate  - s/p vanc+zosyn, levaquin  750 mg, 1 g APAP 1 L NS bolus in ED  - If patient not intubated, continue tylenol 975 mg q6h standing to mediate cytokine storm  - f/u T cell subset  - obtain HIV consent for testing when able  - UA , f/u UCx  - f/u BCx    #COVID+  - ID consulted, f/u recs  - hydroxychloroquine 400mg q12h x2 doses then 200mg q12h (ID approved)  - Kaletra no longer available  - tociluzumab 560mg IV x 1 dose (ID approved)  - daily CBC/CMP/Mg/Phos/CRP  - q3d labs: ESR, Tcell subset, d-dimer, LDH, ferritin, CK, trop, coags  - COVID isolation protocol  - obtain consent for HIV testing when able    #Hypoxic respiratory failure 2/2 ARDS 2/2 COVID+  - CXR on arrival with BL multilobar interstitial opacities c/w ARDS 2/2 COVID+  - O2sat 100% RA with improvement   - ABG on arrival: pH 7.29, pCO2 37, pO2 19, HCO3 18  - repeat ABG post-intubation   - continue to trend ABG with vent adjustments    #Impending ARDS  - as above  - anticipate intubation once patient arrives on floor  - low threshold to prone pt in order to improve oxygenation and secretion drainage  - continue to closely monitor pt    Cardiovascular:  #Hypotension  - Currently maintaining MAP >65  - Anticipate pressure support with likely intubation    Neurology:  #Sedation  - pt currently on fentanyl gtt and propofol gtt   - RASS goal -4    Gastrointestinal:  #Prophylaxis  - protonix 40mg IV daily while intubated    Genitourinary:  #Decreased urine output  - jackman placed 3/13  - continue to monitor strict I&O  - UA   - Cr     ID:  #Sepsis 2/2 COVID+  - as above  - UA   - f/u BCx, UCx  - ID consulted, f/u recs    F:   E: Replete K<4, Mg<2  N: NPO   G: protonix 40mg IV daily    Code: FULL CODE    Dispo: Patient requires continued monitoring in MICU Ms. Gudino is a 29 year old female with a past medical history of recurrent pneumonia who presents with fever, cough, and shortness of breath. She was admitted to the MICU for acute hypoxic respiratory distress 2/2 influenza A vs. post-viral PNA vs. COVID infection.    Pulmonary:  #Sepsis 2/2 COVID+ vs. lobar PNA vs. Influenza  - Flu A positive at outside clinic on tamiflu and Levaquin  - on arrival meeting 3/4 SIRS criteria (RUL and possible LLL) with pulmonary source  - WBC 8.29  with 54.8% neutrophils, 4.4% lymphocytes, lactate 1.8  - s/p vanc+zosyn, levaquin  750 mg, 1 g APAP 1 L NS bolus in ED  - If patient not intubated, continue Tylenol 975 mg q6h standing to mediate cytokine storm  - f/u T cell subset  - obtain HIV consent for testing when able  - UA , f/u UCx  - f/u BCx    #COVID+  - ID consulted, f/u recs  - hydroxychloroquine 400mg q12h x2 doses then 200mg q12h (ID approved)  - Kaletra no longer available  - tociluzumab 560mg IV x 1 dose (ID approved)  - daily CBC/CMP/Mg/Phos/CRP  - q3d labs: ESR, Tcell subset, d-dimer, LDH, ferritin, CK, trop, coags  - COVID isolation protocol  - obtain consent for HIV testing when able    #Hypoxic respiratory failure 2/2 ARDS 2/2 COVID+  - CXR on arrival with BL multilobar interstitial opacities c/w ARDS 2/2 COVID+  - O2sat 100% RA, BiPap  - ABG on arrival: pH 7.29, pCO2 37, pO2 19, HCO3 18  - repeat ABG post-intubation   - continue to trend ABG with vent adjustments    #Impending ARDS  - as above  - anticipate intubation once patient arrives on floor  - low threshold to prone pt in order to improve oxygenation and secretion drainage  - continue to closely monitor pt    Cardiovascular:  #Hypotension  - Currently maintaining MAP >65  - Anticipate pressure support with likely intubation    Neurology:  #DEEDEE  -anticipate intubation    Gastrointestinal:  #Prophylaxis  - Protonix 40mg IV daily while intubated    Genitourinary:  #Decreased urine output  - place jackman with intubation  - continue to monitor strict I&O  - F/U UA   - F/U daily Cr on BMP    ID:  #Sepsis 2/2 COVID+ vs. Influenxa A vs. Lobar PNA  - as above  - Continue Tamiflu daily, Zosyn/Vanc, DC levaquin  - F/U UA   - f/u BCx, UCx  - ID approval for tocilizumab, hydroxychloroquine f/u recs    F: s/p 1 L NS in ED   E: Replete K<4, Mg<2  N: NPO   G: protonix 40mg IV daily    Code: FULL CODE    Dispo: Patient requires continued monitoring in MICU Ms. Gudino is a 29 year old female with a past medical history of recurrent pneumonia who presents with fever, cough, and shortness of breath. She was admitted to the MICU for acute hypoxic respiratory distress 2/2 influenza A vs. post-viral PNA vs. COVID infection.    Pulmonary:  #Sepsis 2/2 COVID+ vs. lobar PNA vs. Influenza  - Flu A positive at outside clinic on tamiflu and Levaquin  - on arrival meeting 3/4 SIRS criteria (RUL and possible LLL) with pulmonary source  - WBC 8.29  with 54.8% neutrophils, 4.4% lymphocytes, lactate 1.8  - s/p vanc+zosyn, levaquin  750 mg, 1 g APAP 1 L NS bolus in ED  - Continue Vanc/Zosyn  - ID approval for Hydroxychloroquine and Tocilizumab   - Order Tylenol 975 mg q6h standing to mediate cytokine storm if continually febrile after Tocilizumab dose  - f/u T cell subset  - obtain HIV consent for testing when able  - UA , f/u UCx  - f/u BCx    #COVID+  - ID consulted, f/u recs  - hydroxychloroquine 400mg q12h x2 doses then 200mg q12h (ID approved)  - Kaletra no longer available  - tociluzumab 560mg IV x 1 dose (ID approved)  - daily CBC/CMP/Mg/Phos/CRP  - q3d labs: ESR, Tcell subset, d-dimer, LDH, ferritin, CK, trop, coags  - COVID isolation protocol  - obtain consent for HIV testing when able    #Hypoxic respiratory failure 2/2 ARDS 2/2 COVID+  - CXR on arrival with BL multilobar interstitial opacities c/w ARDS 2/2 COVID+  - O2sat 100% RA, BiPap  - ABG on arrival: pH 7.29, pCO2 37, pO2 19, HCO3 18  - repeat ABG post-intubation   - continue to trend ABG with vent adjustments    #Impending ARDS  - as above  - anticipate intubation once patient arrives on floor  - low threshold to prone pt in order to improve oxygenation and secretion drainage  - continue to closely monitor pt    Cardiovascular:  #Hypotension  - Currently maintaining MAP >65  - Anticipate pressure support with likely intubation    Neurology:  #DEEDEE  -anticipate intubation    Gastrointestinal:  #Prophylaxis  - Protonix 40mg IV daily while intubated    Genitourinary:  #Decreased urine output  - place jackman with intubation  - continue to monitor strict I&O  - F/U UA   - F/U daily Cr on BMP    ID:  #Sepsis 2/2 COVID+ vs. Influenxa A vs. Lobar PNA  - as above  - Continue Tamiflu daily, Zosyn/Vanc, DC levaquin  - F/U UA   - f/u BCx, UCx  - ID approval for tocilizumab, hydroxychloroquine f/u recs    F: s/p 1 L NS in ED   E: Replete K<4, Mg<2  N: NPO   G: protonix 40mg IV daily    Code: FULL CODE    Dispo: Patient requires continued monitoring in MICU Ms. Gudino is a 29 year old female with a past medical history of recurrent pneumonia who presents with fever, cough, and shortness of breath. She was admitted to the MICU for acute hypoxic respiratory distress 2/2 influenza A vs. post-viral PNA vs. COVID infection.    Pulmonary:  #Sepsis 2/2 Rule out COVID+ vs. lobar PNA vs. Influenza  - Flu A positive at outside clinic on tamiflu and Levaquin  - on arrival meeting 3/4 SIRS criteria (RUL and possible LLL) with pulmonary source  - WBC 8.29  with 54.8% neutrophils, 4.4% lymphocytes, lactate 1.8  - s/p vanc+zosyn, levaquin  750 mg, 1 g APAP 1 L NS bolus in ED  - Continue Vanc/Zosyn  - ID approval for Hydroxychloroquine and Tocilizumab   - Order Tylenol 975 mg q6h standing to mediate cytokine storm if continually febrile after Tocilizumab dose  - f/u T cell subset  - obtain HIV consent for testing when able  - UA , f/u UCx  - f/u BCx    #R/O COVID  - ID consulted, f/u recs  - hydroxychloroquine 400mg q12h x2 doses then 200mg q12h (ID approved)  - Kaletra no longer available  - tociluzumab 560mg IV x 1 dose (ID approved)  - daily CBC/CMP/Mg/Phos/CRP  - q3d labs: ESR, Tcell subset, d-dimer, LDH, ferritin, CK, trop, coags  - COVID isolation protocol  - obtain consent for HIV testing when able    #Hypoxic respiratory failure 2/2 ARDS 2/2 rule out COVID infection  - CXR on arrival with BL multilobar interstitial opacities  - O2sat 100% RA  - ABG on arrival: pH 7.29, pCO2 37, pO2 19, HCO3 18  - repeat ABG post-intubation   - continue to trend ABG with vent adjustments    #Impending ARDS  - as above  - anticipate intubation once patient arrives on floor  - low threshold to prone pt in order to improve oxygenation and secretion drainage  - continue to closely monitor pt    Cardiovascular:  #Hypotension  - Currently maintaining MAP >65  - Anticipate pressure support with likely intubation    Neurology:  #DEEDEE  -anticipate intubation    Gastrointestinal:  #Prophylaxis  - Protonix 40mg IV daily while intubated    Genitourinary:  #Decreased urine output  - place jackman with intubation  - continue to monitor strict I&O  - F/U UA   - F/U daily Cr on BMP    ID:  #Sepsis 2/2 COVID+ vs. Influenxa A vs. Lobar PNA  - as above  - Continue Tamiflu daily, Zosyn/Vanc, DC levaquin  - F/U UA   - f/u BCx, UCx  - ID approval for tocilizumab, hydroxychloroquine f/u recs    F: s/p 1 L NS in ED   E: Replete K<4, Mg<2  N: NPO   G: protonix 40mg IV daily    Code: FULL CODE    Dispo: Patient requires continued monitoring in MICU

## 2020-03-18 NOTE — CONSULT NOTE ADULT - ASSESSMENT
29F with no risk factors or significant past medial history who presents in acute respiratory distress after being diagnosed with Influenza A and PNA 2 Days PTA. Patient currently is speaking full sentences but does not appear comfortable on NRFM. CXR shows significant worsening with concern for ARDS. Patient is high risk for intubation and would benefit from high level of monitoring.     #Acute Respiratory Distress Syndrome  -Recommend Retesting COVID-19 as patient states she was told it would take 6 days or longer for results from [facility]  -Recommend broadening coverage of antibiotics to include Vancomycin to cover for possible post-Flu PNA (Staph)  -Clarifying patient Cephalosporin allergy to determine other possible agents (vs Levaquin).   -Please obtain an ABG when possible to help stratify patient  -Aggressive fever control, patient can receive 1g q6h to help control temperatures  -Avoid Nebulizers and NIV techniques while patient is being ruled out for COVID-19      Dispo: Patient should be admitted to ICU for further monitoring as she is currently high risk for intubation. 29F with no risk factors or significant past medial history who presents in acute respiratory distress after being diagnosed with Influenza A and PNA 2 Days PTA. Patient currently is speaking full sentences but does not appear comfortable on NRFM. CXR shows significant worsening with concern for ARDS. Patient is high risk for intubation and would benefit from high level of monitoring.     #Acute Respiratory Distress Syndrome  -Recommend Retesting COVID-19 as patient states she was told it would take 6 days or longer for results from [facility]  -Recommend broadening coverage of antibiotics to include Vancomycin to cover for possible post-Flu PNA (Staph)  -Patient not allergic to Cephalosporin (small rash and she was told to remove from chart), would start Zosyn  -ID consult for COVID-19 medications  -Please obtain an ABG when possible to help stratify patient  -Aggressive fever control, patient can receive 1g q6h to help control temperatures  -Avoid Nebulizers and NIV techniques while patient is being ruled out for COVID-19      Dispo: Patient should be admitted to ICU for further monitoring as she is currently high risk for intubation. 29F with no risk factors or significant past medial history who presents in acute respiratory distress after being diagnosed with Influenza A and PNA 2 Days PTA. Patient currently is speaking full sentences but does not appear comfortable on NRFM. CXR shows significant worsening with concern for ARDS. Patient is high risk for intubation and would benefit from high level of monitoring.     #Acute Respiratory Distress Syndrome  -Recommend Retesting COVID-19 as patient states she was told it would take 6 days or longer for results from [facility]  -Recommend broadening coverage of antibiotics to include Vancomycin to cover for possible post-Flu PNA (Staph)  -Patient not allergic to Cephalosporin (small rash and she was told to remove from chart), would start Zosyn  -ID consult for COVID-19 medications  -Aggressive fever control, patient can receive 1g q6h to help control temperatures  -Avoid Nebulizers and NIV techniques while patient is being ruled out for COVID-19      Dispo: Patient should be admitted to ICU for further monitoring as she is currently high risk for intubation. 29F with no risk factors or significant past medial history who presents in acute respiratory distress after being diagnosed with Influenza A and PNA 2 Days PTA. Patient currently is speaking full sentences but does not appear comfortable on NRFM. CXR shows significant worsening with concern for ARDS. Patient is high risk for intubation and would benefit from high level of monitoring.     #Acute Respiratory Distress Syndrome  -Recommend Retesting COVID-19 as patient states she was told it would take 6 days or longer for results from [facility]  -Recommend broadening coverage of antibiotics to include Vancomycin to cover for possible post-Flu PNA (Staph)  -Patient not allergic to Cephalosporin (small rash and she was told to remove from chart), would start Zosyn  -ID consult for COVID-19 medications  -Recommend Procalcitonin, LDH,   -Aggressive fever control, patient can receive 1g q6h to help control temperatures  -Avoid Nebulizers and NIV techniques while patient is being ruled out for COVID-19      Dispo: Patient should be admitted to ICU for further monitoring as she is currently high risk for intubation. 29F with no risk factors or significant past medial history who presents in acute respiratory distress after being diagnosed with Influenza A and PNA 2 Days PTA. Patient currently is speaking full sentences but does not appear comfortable on NRFM. CXR shows significant worsening with concern for ARDS. Patient is high risk for intubation and would benefit from high level of monitoring.     #Acute Respiratory Distress Syndrome  -Recommend Retesting COVID-19 as patient states she was told it would take 6 days or longer for results from [facility]  -Recommend broadening coverage of antibiotics to include Vancomycin to cover for possible post-Flu PNA (Staph)  -Patient not allergic to Cephalosporin (small rash and she was told to remove from chart), would start Zosyn  -ID consult for COVID-19 medications  -Recommend Procalcitonin, Ferritin, and CRP be added on to most recent labs  -Aggressive fever control, patient can receive 1g q6h to help control temperatures  -Avoid Nebulizers and NIV techniques while patient is being ruled out for COVID-19      Dispo: Patient should be admitted to ICU for further monitoring as she is currently high risk for intubation.

## 2020-03-18 NOTE — CONSULT NOTE ADULT - SUBJECTIVE AND OBJECTIVE BOX
[facility] SERVICE CONSULTATION NOTE    CC:  Fever, shortness of breath    HPI:  Patient is a 30 yo F with a reported hx of PNA 5 times in the past who presents with fevers, myalgias and shortness of breath.  She reports that she presented to [facility] on Monday 3/16/2020 with fevers, chills, dry non productive cough and was diagnosed with Flu A and RUL PNA on CXR as well as COVID swabbed (results pending).  She was prescribed Levaquin, Tamiflu, Albuterol inhaler and discharged for self quarantine at home.  She reports that myalgias and chills improved s/p tamiflu but continued to have fevers to peak of 103 oral today prompting her to present to the ED with her mother today. She denies any underlying lung disease, asthma, or smoking.  Works as a  and had been the only one in the office prior to recent self quarantine and no known COVID contacts.  Denies any chest pain, headaches, diarrhea, abdominal pain, LE edema.  Reports feeling some shortness of breath currently that has worsened in the ED with sitting up.  She reports never getting influenza vaccines due to history of reaction to vaccines although unclear what her reaction is.     ICU consulted for increased work of breathing.  Patient received Tylenol 1000mg, Levaquin 750mg, Albuterol inhaler.    ROS:  Otherwise negative, except as specified in HPI.    PMH:  Reported PNA 5 times in the past, cannot recall date of last episode.    FH:  A fib - Mother    SH:  Denies smoking, works as     ALLERGIES:    MEDICATIONS:  Lutera  Appetite suppressant - unsure name  Tamiflu  Levaquin  Albuterol    VITAL SIGNS:  ICU Vital Signs Last 24 Hrs  T(C): 39.4 (18 Mar 2020 16:40), Max: 39.4 (18 Mar 2020 16:40)  T(F): 102.9 (18 Mar 2020 16:40), Max: 102.9 (18 Mar 2020 16:40)  HR: 93 (18 Mar 2020 17:10) (93 - 105)  BP: 111/59 (18 Mar 2020 17:10) (95/58 - 111/59)  BP(mean): --  ABP: --  ABP(mean): --  RR: 20 (18 Mar 2020 17:10) (20 - 22)  SpO2: 100% (18 Mar 2020 17:10) (98% - 100%)    CAPILLARY BLOOD GLUCOSE      PHYSICAL EXAM:  Constitutional: Sitting up in bed in mild discomfort on nonrebreather, speaking in full sentences, well developed  HEENT: NC/AT; PERRL, anicteric sclera; no oropharyngeal erythema or exudates; MMM  Neck: supple, no appreciable JVD  Respiratory: Markedly diminished breath sound at R base to R mid posterior lung fields, decreased breath sounds at L base.  Tachypneic.  No wheezing or rhonchi; conversive in full sentences  Cardiovascular: +S1/S2, RRR  Gastrointestinal: abdomen soft, NT/ND  Extremities: WWP; no clubbing, cyanosis or edema  Vascular: 2+ radial, femoral, and DP/PT pulses B/L  Dermatologic: skin normal color and turgor; no visible rashes  Neurological: A&Ox3, no focal deficits, gait deferred    LABS:                        13.1   8.29  )-----------( 226      ( 18 Mar 2020 16:39 )             39.9     03-18    135  |  102  |  14  ----------------------------<  123<H>  3.8   |  19<L>  |  1.02    Ca    8.6      18 Mar 2020 16:39    TPro  6.0  /  Alb  3.0<L>  /  TBili  0.4  /  DBili  x   /  AST  40  /  ALT  31  /  AlkPhos  46  03-18    PT/INR - ( 18 Mar 2020 16:39 )   PT: 15.7 sec;   INR: 1.36          PTT - ( 18 Mar 2020 16:39 )  PTT:36.2 sec  Lactate, Blood: 1.8 mmol/L (03-18-20 @ 16:38)    Blood Gas Profile - Venous (03.18.20 @ 16:42)    pH, Venous: 7.29    pCO2, Venous: 37 mmHg    pO2, Venous: 19 mmHg    HCO3, Venous: 18 mmol/L    Oxygen Saturation, Venous: 26 %    RADIOLOGY & ADDITIONAL TESTS: Reviewed. [facility] SERVICE CONSULTATION NOTE    CC:  Fever, shortness of breath    HPI:  Patient is a 28 yo F with a reported hx of PNA 5 times in the past who presents with fevers, myalgias and shortness of breath.  She reports that she presented to [facility] on Monday 3/16/2020 with fevers, chills, dry non productive cough and was diagnosed with Flu A and RUL PNA on CXR as well as COVID swabbed (results pending).  She was prescribed Levaquin, Tamiflu, Albuterol inhaler and discharged for self quarantine at home.  She reports that myalgias and chills improved s/p tamiflu but continued to have fevers to peak of 103 oral today prompting her to present to the ED with her mother today. She denies any underlying lung disease, asthma, or smoking.  Works as a  and had been the only one in the office prior to recent self quarantine and no known COVID contacts.  Denies any chest pain, headaches, diarrhea, abdominal pain, LE edema.  Reports feeling some shortness of breath currently that has worsened in the ED with sitting up.  She reports never getting influenza vaccines due to history of reaction to vaccines although unclear what her reaction is.     ICU consulted for increased work of breathing.  Patient received Tylenol 1000mg, Levaquin 750mg, Albuterol inhaler.    ROS:  Otherwise negative, except as specified in HPI.    PMH:  Reported PNA 5 times in the past, cannot recall date of last episode.    FH:  A fib - Mother    SH:  Denies smoking or vaping, works as .  Social EtOH use.    ALLERGIES: Per chart Ceclor - patient reports small rash, never anaphylaxis or respiratory symptoms.  Previously told to remove it from her chart    MEDICATIONS:  Lutera  Appetite suppressant - unsure name  Tamiflu  Levaquin  Albuterol    VITAL SIGNS:  ICU Vital Signs Last 24 Hrs  T(C): 39.4 (18 Mar 2020 16:40), Max: 39.4 (18 Mar 2020 16:40)  T(F): 102.9 (18 Mar 2020 16:40), Max: 102.9 (18 Mar 2020 16:40)  HR: 93 (18 Mar 2020 17:10) (93 - 105)  BP: 111/59 (18 Mar 2020 17:10) (95/58 - 111/59)  BP(mean): --  ABP: --  ABP(mean): --  RR: 20 (18 Mar 2020 17:10) (20 - 22)  SpO2: 100% (18 Mar 2020 17:10) (98% - 100%)    CAPILLARY BLOOD GLUCOSE      PHYSICAL EXAM:  Constitutional: Sitting up in bed in mild discomfort on nonrebreather, speaking in full sentences, well developed  HEENT: NC/AT; PERRL, anicteric sclera; no oropharyngeal erythema or exudates; MMM  Neck: supple, no appreciable JVD  Respiratory: Markedly diminished breath sound at R base to R mid posterior lung fields, decreased breath sounds at L base.  Tachypneic.  No wheezing or rhonchi; conversive in full sentences  Cardiovascular: +S1/S2, RRR  Gastrointestinal: abdomen soft, NT/ND  Extremities: WWP; no clubbing, cyanosis or edema  Vascular: 2+ radial, femoral, and DP/PT pulses B/L  Dermatologic: skin normal color and turgor; no visible rashes  Neurological: A&Ox3, anxious, no focal deficits, gait deferred    LABS:                        13.1   8.29  )-----------( 226      ( 18 Mar 2020 16:39 )             39.9     03-18    135  |  102  |  14  ----------------------------<  123<H>  3.8   |  19<L>  |  1.02    Ca    8.6      18 Mar 2020 16:39    TPro  6.0  /  Alb  3.0<L>  /  TBili  0.4  /  DBili  x   /  AST  40  /  ALT  31  /  AlkPhos  46  03-18    PT/INR - ( 18 Mar 2020 16:39 )   PT: 15.7 sec;   INR: 1.36          PTT - ( 18 Mar 2020 16:39 )  PTT:36.2 sec  Lactate, Blood: 1.8 mmol/L (03-18-20 @ 16:38)    Blood Gas Profile - Venous (03.18.20 @ 16:42)    pH, Venous: 7.29    pCO2, Venous: 37 mmHg    pO2, Venous: 19 mmHg    HCO3, Venous: 18 mmol/L    Oxygen Saturation, Venous: 26 %    RADIOLOGY & ADDITIONAL TESTS: Reviewed.

## 2020-03-18 NOTE — ED PROVIDER NOTE - CONSTITUTIONAL, MLM
normal... Pale-appearing, awake, alert, oriented to person, place, time/situation and in mild respiratory distress. Pale, ill appearing, awake, alert, oriented to person, place, time/situation and in mild respiratory distress.

## 2020-03-18 NOTE — ED ADULT NURSE NOTE - CAS EDN DISCHARGE ASSESSMENT
No adverse reaction to first time med in ED/Patient baseline mental status/Awake/Alert and oriented to person, place and time

## 2020-03-18 NOTE — ED PROVIDER NOTE - PROGRESS NOTE DETAILS
Pt evaluated by ICU, admission to MICU recommended. Pt started on Vanco for pneumonia, CXR findings ARDS. IVF has been stopped, BP [id number].

## 2020-03-18 NOTE — CHART NOTE - NSCHARTNOTEFT_GEN_A_CORE
Infectious Diseases Anti-infective Approval Note    Medication:  Hydroxychloroqine  Dose:  400 mg; then 2oo mg   Route: po  Frequency: q12h  Duration: 1 day;  4 days    Dose may be adjusted as needed for alterations in renal function.    *THIS IS NOT AN INFECTIOUS DISEASES CONSULTATION*

## 2020-03-18 NOTE — ED PROVIDER NOTE - ATTENDING CONTRIBUTION TO CARE
29 F co fever and cough- dx with pneumonia on Monday- and Flu A- started on tamiflu and levaquin- has taken both for 3 days- not improving  vs- hypoxic- 91% on rm air- now 95%  increased wob  lungs BL rales at bases  s1s2  abd soft nt nd +bs  ext no c/c/e  IMP- Pneumonia  cxr labs, abx

## 2020-03-18 NOTE — CHART NOTE - NSCHARTNOTEFT_GEN_A_CORE
Infectious Diseases Anti-infective Approval Note    Medication:  Tocilizumab  Dose:  500 mg  Route:  IV  Frequency: q8h  Duration: up to 3 doses if no response    Dose may be adjusted as needed for alterations in renal function.    *THIS IS NOT AN INFECTIOUS DISEASES CONSULTATION*

## 2020-03-18 NOTE — ED ADULT NURSE NOTE - OBJECTIVE STATEMENT
28 yo F pmhx staph infection, recently dx with pneumonia and flu B and taking levofloxacin x2 days BIBEMS co cough and SOB and chest pressure. Pt has no known exposure to sick contacts/ COVID19. Upon arrival pt sating at 100% on RA speaking in clear and coherent sentences. Upon assessment pt is AOx3, lung sounds diminished, abdomen nondistended and nontender, no swelling noted at this time. PT denies chest pain, N/V/D, body aches, chills. Pt placed on continuous cardiac monitor, pulse ox on.

## 2020-03-19 LAB
4/8 RATIO: 2.74 RATIO — SIGNIFICANT CHANGE UP (ref 0.9–3.6)
ABS CD8: 191 /UL — SIGNIFICANT CHANGE UP (ref 142–740)
ALBUMIN SERPL ELPH-MCNC: 2.3 G/DL — LOW (ref 3.3–5)
ALP SERPL-CCNC: 36 U/L — LOW (ref 40–120)
ALT FLD-CCNC: 38 U/L — SIGNIFICANT CHANGE UP (ref 10–45)
ANION GAP SERPL CALC-SCNC: 11 MMOL/L — SIGNIFICANT CHANGE UP (ref 5–17)
AST SERPL-CCNC: 67 U/L — HIGH (ref 10–40)
BASE EXCESS BLDA CALC-SCNC: -6 MMOL/L — LOW (ref -2–3)
BASE EXCESS BLDA CALC-SCNC: -6.5 MMOL/L — LOW (ref -2–3)
BASE EXCESS BLDA CALC-SCNC: -7.4 MMOL/L — LOW (ref -2–3)
BASOPHILS # BLD AUTO: 0 K/UL — SIGNIFICANT CHANGE UP (ref 0–0.2)
BASOPHILS NFR BLD AUTO: 0 % — SIGNIFICANT CHANGE UP (ref 0–2)
BILIRUB SERPL-MCNC: 0.4 MG/DL — SIGNIFICANT CHANGE UP (ref 0.2–1.2)
BUN SERPL-MCNC: 8 MG/DL — SIGNIFICANT CHANGE UP (ref 7–23)
CALCIUM SERPL-MCNC: 7.4 MG/DL — LOW (ref 8.4–10.5)
CD3 BLASTS SPEC-ACNC: 71 % — SIGNIFICANT CHANGE UP (ref 59–83)
CD3 BLASTS SPEC-ACNC: 725 /UL — SIGNIFICANT CHANGE UP (ref 672–1870)
CD4 %: 51 % — SIGNIFICANT CHANGE UP (ref 30–62)
CD8 %: 19 % — SIGNIFICANT CHANGE UP (ref 12–36)
CHLORIDE SERPL-SCNC: 106 MMOL/L — SIGNIFICANT CHANGE UP (ref 96–108)
CK SERPL-CCNC: 184 U/L — HIGH (ref 25–170)
CO2 SERPL-SCNC: 18 MMOL/L — LOW (ref 22–31)
CREAT SERPL-MCNC: 0.63 MG/DL — SIGNIFICANT CHANGE UP (ref 0.5–1.3)
CRP SERPL-MCNC: 27.44 MG/DL — HIGH (ref 0–0.4)
D DIMER BLD IA.RAPID-MCNC: 887 NG/ML DDU — HIGH
EOSINOPHIL # BLD AUTO: 0 K/UL — SIGNIFICANT CHANGE UP (ref 0–0.5)
EOSINOPHIL NFR BLD AUTO: 0 % — SIGNIFICANT CHANGE UP (ref 0–6)
ERYTHROCYTE [SEDIMENTATION RATE] IN BLOOD: 20 MM/HR — HIGH
FERRITIN SERPL-MCNC: 649 NG/ML — HIGH (ref 15–150)
GAS PNL BLDA: SIGNIFICANT CHANGE UP
GAS PNL BLDA: SIGNIFICANT CHANGE UP
GLUCOSE BLDC GLUCOMTR-MCNC: 70 MG/DL — SIGNIFICANT CHANGE UP (ref 70–99)
GLUCOSE BLDC GLUCOMTR-MCNC: 81 MG/DL — SIGNIFICANT CHANGE UP (ref 70–99)
GLUCOSE BLDC GLUCOMTR-MCNC: 83 MG/DL — SIGNIFICANT CHANGE UP (ref 70–99)
GLUCOSE BLDC GLUCOMTR-MCNC: 86 MG/DL — SIGNIFICANT CHANGE UP (ref 70–99)
GLUCOSE SERPL-MCNC: 162 MG/DL — HIGH (ref 70–99)
HBA1C BLD-MCNC: 5.1 % — SIGNIFICANT CHANGE UP (ref 4–5.6)
HCO3 BLDA-SCNC: 18 MMOL/L — LOW (ref 21–28)
HCO3 BLDA-SCNC: 19 MMOL/L — LOW (ref 21–28)
HCO3 BLDA-SCNC: 19 MMOL/L — LOW (ref 21–28)
HCT VFR BLD CALC: 37.8 % — SIGNIFICANT CHANGE UP (ref 34.5–45)
HGB BLD-MCNC: 12.4 G/DL — SIGNIFICANT CHANGE UP (ref 11.5–15.5)
HIV 1+2 AB+HIV1 P24 AG SERPL QL IA: SIGNIFICANT CHANGE UP
LACTATE SERPL-SCNC: 1 MMOL/L — SIGNIFICANT CHANGE UP (ref 0.5–2)
LDH SERPL L TO P-CCNC: 375 U/L — HIGH (ref 50–242)
LYMPHOCYTES # BLD AUTO: 0.82 K/UL — LOW (ref 1–3.3)
LYMPHOCYTES # BLD AUTO: 9 % — LOW (ref 13–44)
MAGNESIUM SERPL-MCNC: 1.8 MG/DL — SIGNIFICANT CHANGE UP (ref 1.6–2.6)
MCHC RBC-ENTMCNC: 31.5 PG — SIGNIFICANT CHANGE UP (ref 27–34)
MCHC RBC-ENTMCNC: 32.8 GM/DL — SIGNIFICANT CHANGE UP (ref 32–36)
MCV RBC AUTO: 95.9 FL — SIGNIFICANT CHANGE UP (ref 80–100)
MONOCYTES # BLD AUTO: 0 K/UL — SIGNIFICANT CHANGE UP (ref 0–0.9)
MONOCYTES NFR BLD AUTO: 0 % — LOW (ref 2–14)
MRSA PCR RESULT.: NEGATIVE — SIGNIFICANT CHANGE UP
NEUTROPHILS # BLD AUTO: 8.32 K/UL — HIGH (ref 1.8–7.4)
NEUTROPHILS NFR BLD AUTO: 78 % — HIGH (ref 43–77)
NRBC # BLD: SIGNIFICANT CHANGE UP /100 WBCS (ref 0–0)
PCO2 BLDA: 31 MMHG — LOW (ref 32–45)
PCO2 BLDA: 38 MMHG — SIGNIFICANT CHANGE UP (ref 32–45)
PCO2 BLDA: 40 MMHG — SIGNIFICANT CHANGE UP (ref 32–45)
PH BLDA: 7.28 — LOW (ref 7.35–7.45)
PH BLDA: 7.32 — LOW (ref 7.35–7.45)
PH BLDA: 7.38 — SIGNIFICANT CHANGE UP (ref 7.35–7.45)
PHOSPHATE SERPL-MCNC: 2.7 MG/DL — SIGNIFICANT CHANGE UP (ref 2.5–4.5)
PLATELET # BLD AUTO: 249 K/UL — SIGNIFICANT CHANGE UP (ref 150–400)
PO2 BLDA: 67 MMHG — LOW (ref 83–108)
PO2 BLDA: 67 MMHG — LOW (ref 83–108)
PO2 BLDA: 98 MMHG — SIGNIFICANT CHANGE UP (ref 83–108)
POTASSIUM SERPL-MCNC: 3.4 MMOL/L — LOW (ref 3.5–5.3)
POTASSIUM SERPL-SCNC: 3.4 MMOL/L — LOW (ref 3.5–5.3)
PROCALCITONIN SERPL-MCNC: 15.15 NG/ML — HIGH (ref 0.02–0.1)
PROT SERPL-MCNC: 4.7 G/DL — LOW (ref 6–8.3)
RBC # BLD: 3.94 M/UL — SIGNIFICANT CHANGE UP (ref 3.8–5.2)
RBC # FLD: 15.1 % — HIGH (ref 10.3–14.5)
S AUREUS DNA NOSE QL NAA+PROBE: NEGATIVE — SIGNIFICANT CHANGE UP
SAO2 % BLDA: 92 % — LOW (ref 95–100)
SAO2 % BLDA: 93 % — LOW (ref 95–100)
SAO2 % BLDA: 98 % — SIGNIFICANT CHANGE UP (ref 95–100)
SODIUM SERPL-SCNC: 135 MMOL/L — SIGNIFICANT CHANGE UP (ref 135–145)
T-CELL CD4 SUBSET PNL BLD: 524 /UL — SIGNIFICANT CHANGE UP (ref 489–1457)
TRIGL SERPL-MCNC: 326 MG/DL — HIGH (ref 10–149)
WBC # BLD: 9.14 K/UL — SIGNIFICANT CHANGE UP (ref 3.8–10.5)
WBC # FLD AUTO: 9.14 K/UL — SIGNIFICANT CHANGE UP (ref 3.8–10.5)

## 2020-03-19 PROCEDURE — 71045 X-RAY EXAM CHEST 1 VIEW: CPT | Mod: 26,77

## 2020-03-19 PROCEDURE — 99291 CRITICAL CARE FIRST HOUR: CPT | Mod: 25

## 2020-03-19 PROCEDURE — 71045 X-RAY EXAM CHEST 1 VIEW: CPT | Mod: 26

## 2020-03-19 RX ORDER — POTASSIUM CHLORIDE 20 MEQ
40 PACKET (EA) ORAL ONCE
Refills: 0 | Status: COMPLETED | OUTPATIENT
Start: 2020-03-19 | End: 2020-03-19

## 2020-03-19 RX ORDER — BENZOCAINE AND MENTHOL 5; 1 G/100ML; G/100ML
1 LIQUID ORAL
Refills: 0 | Status: DISCONTINUED | OUTPATIENT
Start: 2020-03-19 | End: 2020-03-19

## 2020-03-19 RX ORDER — FENTANYL CITRATE 50 UG/ML
0.5 INJECTION INTRAVENOUS
Qty: 2500 | Refills: 0 | Status: DISCONTINUED | OUTPATIENT
Start: 2020-03-19 | End: 2020-03-19

## 2020-03-19 RX ORDER — MIDAZOLAM HYDROCHLORIDE 1 MG/ML
0.02 INJECTION, SOLUTION INTRAMUSCULAR; INTRAVENOUS
Qty: 100 | Refills: 0 | Status: DISCONTINUED | OUTPATIENT
Start: 2020-03-19 | End: 2020-03-23

## 2020-03-19 RX ORDER — AZITHROMYCIN 500 MG/1
500 TABLET, FILM COATED ORAL EVERY 24 HOURS
Refills: 0 | Status: COMPLETED | OUTPATIENT
Start: 2020-03-19 | End: 2020-03-21

## 2020-03-19 RX ORDER — FENTANYL CITRATE 50 UG/ML
0.5 INJECTION INTRAVENOUS
Qty: 2500 | Refills: 0 | Status: DISCONTINUED | OUTPATIENT
Start: 2020-03-19 | End: 2020-03-23

## 2020-03-19 RX ORDER — ACETAMINOPHEN 500 MG
650 TABLET ORAL ONCE
Refills: 0 | Status: COMPLETED | OUTPATIENT
Start: 2020-03-19 | End: 2020-03-19

## 2020-03-19 RX ORDER — THIAMINE MONONITRATE (VIT B1) 100 MG
250 TABLET ORAL
Refills: 0 | Status: DISCONTINUED | OUTPATIENT
Start: 2020-03-19 | End: 2020-03-19

## 2020-03-19 RX ORDER — PROPOFOL 10 MG/ML
10 INJECTION, EMULSION INTRAVENOUS
Qty: 1000 | Refills: 0 | Status: DISCONTINUED | OUTPATIENT
Start: 2020-03-19 | End: 2020-03-23

## 2020-03-19 RX ORDER — ASCORBIC ACID 60 MG
1500 TABLET,CHEWABLE ORAL EVERY 6 HOURS
Refills: 0 | Status: DISCONTINUED | OUTPATIENT
Start: 2020-03-19 | End: 2020-03-24

## 2020-03-19 RX ORDER — VANCOMYCIN HCL 1 G
1000 VIAL (EA) INTRAVENOUS EVERY 12 HOURS
Refills: 0 | Status: DISCONTINUED | OUTPATIENT
Start: 2020-03-19 | End: 2020-03-20

## 2020-03-19 RX ORDER — CISATRACURIUM BESYLATE 2 MG/ML
6 INJECTION INTRAVENOUS ONCE
Refills: 0 | Status: COMPLETED | OUTPATIENT
Start: 2020-03-19 | End: 2020-03-19

## 2020-03-19 RX ORDER — NOREPINEPHRINE BITARTRATE/D5W 8 MG/250ML
0.05 PLASTIC BAG, INJECTION (ML) INTRAVENOUS
Qty: 8 | Refills: 0 | Status: DISCONTINUED | OUTPATIENT
Start: 2020-03-19 | End: 2020-03-23

## 2020-03-19 RX ORDER — THIAMINE MONONITRATE (VIT B1) 100 MG
200 TABLET ORAL
Refills: 0 | Status: COMPLETED | OUTPATIENT
Start: 2020-03-19 | End: 2020-03-24

## 2020-03-19 RX ORDER — PROPOFOL 10 MG/ML
10 INJECTION, EMULSION INTRAVENOUS
Qty: 1000 | Refills: 0 | Status: DISCONTINUED | OUTPATIENT
Start: 2020-03-19 | End: 2020-03-19

## 2020-03-19 RX ORDER — CISATRACURIUM BESYLATE 2 MG/ML
3 INJECTION INTRAVENOUS
Qty: 200 | Refills: 0 | Status: DISCONTINUED | OUTPATIENT
Start: 2020-03-19 | End: 2020-03-22

## 2020-03-19 RX ORDER — PANTOPRAZOLE SODIUM 20 MG/1
40 TABLET, DELAYED RELEASE ORAL DAILY
Refills: 0 | Status: DISCONTINUED | OUTPATIENT
Start: 2020-03-19 | End: 2020-03-23

## 2020-03-19 RX ORDER — CHLORHEXIDINE GLUCONATE 213 G/1000ML
15 SOLUTION TOPICAL EVERY 12 HOURS
Refills: 0 | Status: DISCONTINUED | OUTPATIENT
Start: 2020-03-19 | End: 2020-03-23

## 2020-03-19 RX ORDER — MIDAZOLAM HYDROCHLORIDE 1 MG/ML
0.02 INJECTION, SOLUTION INTRAMUSCULAR; INTRAVENOUS
Qty: 100 | Refills: 0 | Status: DISCONTINUED | OUTPATIENT
Start: 2020-03-19 | End: 2020-03-19

## 2020-03-19 RX ADMIN — Medication 153 MILLIGRAM(S): at 19:43

## 2020-03-19 RX ADMIN — Medication 153 MILLIGRAM(S): at 23:05

## 2020-03-19 RX ADMIN — PANTOPRAZOLE SODIUM 40 MILLIGRAM(S): 20 TABLET, DELAYED RELEASE ORAL at 13:06

## 2020-03-19 RX ADMIN — PIPERACILLIN AND TAZOBACTAM 100 GRAM(S): 4; .5 INJECTION, POWDER, LYOPHILIZED, FOR SOLUTION INTRAVENOUS at 07:02

## 2020-03-19 RX ADMIN — Medication 400 MILLIGRAM(S): at 13:05

## 2020-03-19 RX ADMIN — CISATRACURIUM BESYLATE 11.4 MICROGRAM(S)/KG/MIN: 2 INJECTION INTRAVENOUS at 10:00

## 2020-03-19 RX ADMIN — Medication 100 MILLIGRAM(S): at 01:38

## 2020-03-19 RX ADMIN — Medication 1 APPLICATION(S): at 21:14

## 2020-03-19 RX ADMIN — Medication 250 MILLIGRAM(S): at 10:00

## 2020-03-19 RX ADMIN — CHLORHEXIDINE GLUCONATE 15 MILLILITER(S): 213 SOLUTION TOPICAL at 05:55

## 2020-03-19 RX ADMIN — Medication 75 MILLIGRAM(S): at 13:06

## 2020-03-19 RX ADMIN — Medication 40 MILLIEQUIVALENT(S): at 17:55

## 2020-03-19 RX ADMIN — AZITHROMYCIN 255 MILLIGRAM(S): 500 TABLET, FILM COATED ORAL at 07:35

## 2020-03-19 RX ADMIN — CHLORHEXIDINE GLUCONATE 1 APPLICATION(S): 213 SOLUTION TOPICAL at 05:56

## 2020-03-19 RX ADMIN — Medication 102 MILLIGRAM(S): at 19:43

## 2020-03-19 RX ADMIN — PIPERACILLIN AND TAZOBACTAM 100 GRAM(S): 4; .5 INJECTION, POWDER, LYOPHILIZED, FOR SOLUTION INTRAVENOUS at 19:23

## 2020-03-19 RX ADMIN — Medication 0.5 MILLIGRAM(S): at 02:32

## 2020-03-19 RX ADMIN — Medication 1 APPLICATION(S): at 13:06

## 2020-03-19 RX ADMIN — Medication 650 MILLIGRAM(S): at 17:55

## 2020-03-19 RX ADMIN — CISATRACURIUM BESYLATE 11.4 MICROGRAM(S)/KG/MIN: 2 INJECTION INTRAVENOUS at 22:08

## 2020-03-19 RX ADMIN — Medication 650 MILLIGRAM(S): at 19:10

## 2020-03-19 RX ADMIN — PIPERACILLIN AND TAZOBACTAM 100 GRAM(S): 4; .5 INJECTION, POWDER, LYOPHILIZED, FOR SOLUTION INTRAVENOUS at 01:15

## 2020-03-19 RX ADMIN — Medication 0.5 MILLIGRAM(S): at 00:12

## 2020-03-19 RX ADMIN — MIDAZOLAM HYDROCHLORIDE 1.27 MG/KG/HR: 1 INJECTION, SOLUTION INTRAMUSCULAR; INTRAVENOUS at 07:02

## 2020-03-19 RX ADMIN — FENTANYL CITRATE 3.18 MICROGRAM(S)/KG/HR: 50 INJECTION INTRAVENOUS at 15:25

## 2020-03-19 RX ADMIN — PROPOFOL 3.81 MICROGRAM(S)/KG/MIN: 10 INJECTION, EMULSION INTRAVENOUS at 20:22

## 2020-03-19 RX ADMIN — Medication 250 MILLIGRAM(S): at 21:14

## 2020-03-19 RX ADMIN — ENOXAPARIN SODIUM 40 MILLIGRAM(S): 100 INJECTION SUBCUTANEOUS at 21:13

## 2020-03-19 RX ADMIN — PIPERACILLIN AND TAZOBACTAM 100 GRAM(S): 4; .5 INJECTION, POWDER, LYOPHILIZED, FOR SOLUTION INTRAVENOUS at 13:05

## 2020-03-19 RX ADMIN — PROPOFOL 3.81 MICROGRAM(S)/KG/MIN: 10 INJECTION, EMULSION INTRAVENOUS at 05:55

## 2020-03-19 RX ADMIN — PROPOFOL 3.81 MICROGRAM(S)/KG/MIN: 10 INJECTION, EMULSION INTRAVENOUS at 10:00

## 2020-03-19 RX ADMIN — CHLORHEXIDINE GLUCONATE 15 MILLILITER(S): 213 SOLUTION TOPICAL at 17:54

## 2020-03-19 RX ADMIN — Medication 40 MILLIEQUIVALENT(S): at 13:06

## 2020-03-19 RX ADMIN — CISATRACURIUM BESYLATE 6 MILLIGRAM(S): 2 INJECTION INTRAVENOUS at 10:00

## 2020-03-19 NOTE — PROGRESS NOTE ADULT - SUBJECTIVE AND OBJECTIVE BOX
INTERVAL HPI/OVERNIGHT EVENTS:  Patient was seen and examined at bedside. As per nurse and patient, no o/n events, patient resting comfortably. No complaints at this time. Patient denies: fever, chills, dizziness, weakness, HA, Changes in vision, CP, palpitations, SOB, cough, N/V/D/C, dysuria, changes in bowel movements, LE edema. ROS otherwise negative.    VITAL SIGNS:  T(F): 98.6 (03-19-20 @ 00:00)  HR: 73 (03-19-20 @ 06:00)  BP: 130/90 (03-19-20 @ 06:00)  RR: 35 (03-19-20 @ 06:00)  SpO2: 94% (03-19-20 @ 06:00)    PHYSICAL EXAM:    Constitutional: WDWN, NAD  HEENT: PERRL, EOMI, sclera non-icteric, neck supple, trachea midline, no masses, no JVD, MMM, good dentition  Respiratory: CTA b/l, good air entry b/l, no wheezing, no rhonchi, no rales, without accessory muscle use and no intercostal retractions  Cardiovascular: RRR, normal S1S2, no M/R/G  Gastrointestinal: soft, NTND, no masses palpable, BS normal  Extremities: Warm, well perfused, pulses equal bilateral upper and lower extremities, no edema, no clubbing  Neurological: AAOx3, CN Grossly intact  Skin: Normal temperature, warm, dry    MEDICATIONS  (STANDING):  chlorhexidine 0.12% Liquid 15 milliLiter(s) Oral Mucosa every 12 hours  chlorhexidine 2% Cloths 1 Application(s) Topical <User Schedule>  dextrose 5%. 1000 milliLiter(s) (50 mL/Hr) IV Continuous <Continuous>  dextrose 50% Injectable 12.5 Gram(s) IV Push once  dextrose 50% Injectable 25 Gram(s) IV Push once  dextrose 50% Injectable 25 Gram(s) IV Push once  enoxaparin Injectable 40 milliGRAM(s) SubCutaneous every 24 hours  fentaNYL   Infusion. 0.5 MICROgram(s)/kG/Hr (3.18 mL/Hr) IV Continuous <Continuous>  hydroxychloroquine 400 milliGRAM(s) Oral every 12 hours  hydroxychloroquine 200 milliGRAM(s) Oral every 12 hours  influenza   Vaccine 0.5 milliLiter(s) IntraMuscular once  insulin lispro (HumaLOG) corrective regimen sliding scale   SubCutaneous every 6 hours  midazolam Infusion 0.02 mG/kG/Hr (1.27 mL/Hr) IV Continuous <Continuous>  norepinephrine Infusion 0.05 MICROgram(s)/kG/Min (5.95 mL/Hr) IV Continuous <Continuous>  piperacillin/tazobactam IVPB.. 3.375 Gram(s) IV Intermittent every 6 hours  propofol Infusion 10 MICROgram(s)/kG/Min (3.81 mL/Hr) IV Continuous <Continuous>    MEDICATIONS  (PRN):  benzocaine 15 mG/menthol 3.6 mG (Sugar-Free) Lozenge 1 Lozenge Oral two times a day PRN Sore Throat  dextrose 40% Gel 15 Gram(s) Oral once PRN Blood Glucose LESS THAN 70 milliGRAM(s)/deciliter  glucagon  Injectable 1 milliGRAM(s) IntraMuscular once PRN Glucose LESS THAN 70 milligrams/deciliter  guaiFENesin   Syrup  (Sugar-Free) 100 milliGRAM(s) Oral every 6 hours PRN Cough      Allergies    Ceclor (Unknown)    Intolerances        LABS:                        13.1   8.29  )-----------( 226      ( 18 Mar 2020 16:39 )             39.9     03-18    135  |  102  |  14  ----------------------------<  123<H>  3.8   |  19<L>  |  1.02    Ca    8.6      18 Mar 2020 16:39    TPro  6.0  /  Alb  3.0<L>  /  TBili  0.4  /  DBili  x   /  AST  40  /  ALT  31  /  AlkPhos  46  03-18    PT/INR - ( 18 Mar 2020 16:39 )   PT: 15.7 sec;   INR: 1.36          PTT - ( 18 Mar 2020 16:39 )  PTT:36.2 sec      RADIOLOGY & ADDITIONAL TESTS:  Reviewed INTERVAL HPI/OVERNIGHT EVENTS:  Intubated at 6. OG     ICU Vital Signs Last 24 Hrs  T(C): 37.7 (19 Mar 2020 10:00), Max: 39.4 (18 Mar 2020 16:40)  T(F): 99.9 (19 Mar 2020 10:00), Max: 102.9 (18 Mar 2020 16:40)  HR: 83 (19 Mar 2020 12:00) (72 - 105)  BP: 103/64 (19 Mar 2020 12:00) (82/53 - 136/89)  BP(mean): 78 (19 Mar 2020 12:00) (63 - 108)  ABP: 81/75 (19 Mar 2020 12:00) (81/75 - 115/59)  ABP(mean): 79 (19 Mar 2020 12:00) (76 - 87)  RR: 30 (19 Mar 2020 12:00) (12 - 50)  SpO2: 99% (19 Mar 2020 12:00) (88% - 100%)      PHYSICAL EXAM:    Constitutional: WDWN, NAD  HEENT: PERRL, EOMI, sclera non-icteric, neck supple, trachea midline, no masses, no JVD, MMM, good dentition  Respiratory: CTA b/l, good air entry b/l, no wheezing, no rhonchi, no rales, without accessory muscle use and no intercostal retractions  Cardiovascular: RRR, normal S1S2, no M/R/G  Gastrointestinal: soft, NTND, no masses palpable, BS normal  Extremities: Warm, well perfused, pulses equal bilateral upper and lower extremities, no edema, no clubbing  Neurological: AAOx3, CN Grossly intact  Skin: Normal temperature, warm, dry    MEDICATIONS  (STANDING):  azithromycin  IVPB 500 milliGRAM(s) IV Intermittent every 24 hours  chlorhexidine 0.12% Liquid 15 milliLiter(s) Oral Mucosa every 12 hours  chlorhexidine 2% Cloths 1 Application(s) Topical <User Schedule>  cisatracurium Infusion 3 MICROgram(s)/kG/Min (11.4 mL/Hr) IV Continuous <Continuous>  dextrose 5%. 1000 milliLiter(s) (50 mL/Hr) IV Continuous <Continuous>  dextrose 50% Injectable 12.5 Gram(s) IV Push once  dextrose 50% Injectable 25 Gram(s) IV Push once  dextrose 50% Injectable 25 Gram(s) IV Push once  enoxaparin Injectable 40 milliGRAM(s) SubCutaneous every 24 hours  fentaNYL   Infusion. 0.5 MICROgram(s)/kG/Hr (3.18 mL/Hr) IV Continuous <Continuous>  hydroxychloroquine 400 milliGRAM(s) Oral every 12 hours  hydroxychloroquine 200 milliGRAM(s) Oral every 12 hours  influenza   Vaccine 0.5 milliLiter(s) IntraMuscular once  insulin lispro (HumaLOG) corrective regimen sliding scale   SubCutaneous every 6 hours  midazolam Infusion 0.02 mG/kG/Hr (1.27 mL/Hr) IV Continuous <Continuous>  norepinephrine Infusion 0.05 MICROgram(s)/kG/Min (5.95 mL/Hr) IV Continuous <Continuous>  oseltamivir Suspension 75 milliGRAM(s) Enteral Tube every 12 hours  pantoprazole   Suspension 40 milliGRAM(s) Enteral Tube daily  petrolatum Ophthalmic Ointment 1 Application(s) Both EYES three times a day  piperacillin/tazobactam IVPB.. 3.375 Gram(s) IV Intermittent every 6 hours  potassium chloride   Powder 40 milliEquivalent(s) Enteral Tube once  propofol Infusion 10 MICROgram(s)/kG/Min (3.81 mL/Hr) IV Continuous <Continuous>  vancomycin  IVPB 1000 milliGRAM(s) IV Intermittent every 12 hours    MEDICATIONS  (PRN):  dextrose 40% Gel 15 Gram(s) Oral once PRN Blood Glucose LESS THAN 70 milliGRAM(s)/deciliter  glucagon  Injectable 1 milliGRAM(s) IntraMuscular once PRN Glucose LESS THAN 70 milligrams/deciliter        Allergies    Ceclor (Unknown)    Intolerances        LABS:                         12.4   9.14  )-----------( 249      ( 19 Mar 2020 09:01 )             37.8     03-19    135  |  106  |  8   ----------------------------<  162<H>  3.4<L>   |  18<L>  |  0.63    Ca    7.4<L>      19 Mar 2020 09:01  Phos  2.7     03-19  Mg     1.8     03-19    TPro  4.7<L>  /  Alb  2.3<L>  /  TBili  0.4  /  DBili  x   /  AST  67<H>  /  ALT  38  /  AlkPhos  36<L>  03-19    PT/INR - ( 18 Mar 2020 16:39 )   PT: 15.7 sec;   INR: 1.36          PTT - ( 18 Mar 2020 16:39 )  PTT:36.2 sec    CARDIAC MARKERS ( 19 Mar 2020 09:01 )  x     / x     / 184 U/L / x     / x            Lactate, Blood: 1.0 mmol/L (03-19 @ 09:00)      RADIOLOGY, EKG & ADDITIONAL TESTS: Reviewed. INTERVAL HPI/OVERNIGHT EVENTS:  Intubated at 6 (see chart note from Dr. Fields). NG Tube and jackman placed.     ICU Vital Signs Last 24 Hrs  T(C): 37.7 (19 Mar 2020 10:00), Max: 39.4 (18 Mar 2020 16:40)  T(F): 99.9 (19 Mar 2020 10:00), Max: 102.9 (18 Mar 2020 16:40)  HR: 83 (19 Mar 2020 12:00) (72 - 105)  BP: 103/64 (19 Mar 2020 12:00) (82/53 - 136/89)  BP(mean): 78 (19 Mar 2020 12:00) (63 - 108)  ABP: 81/75 (19 Mar 2020 12:00) (81/75 - 115/59)  ABP(mean): 79 (19 Mar 2020 12:00) (76 - 87)  RR: 30 (19 Mar 2020 12:00) (12 - 50)  SpO2: 99% (19 Mar 2020 12:00) (88% - 100%)    PHYSICAL EXAM:  (Deferred due to COVID rule-out)  Constitutional: Intubated and sedated   HEENT: PERRL, EOMI, sclera non-icteric, neck supple, trachea midline, no masses, no JVD, MMM, good dentition  Respiratory: C  Cardiovascular: RRR, normal S1S2, no M/R/G  Gastrointestinal: soft, NTND, no masses palpable, BS normal  Extremities: Warm, well perfused, pulses equal bilateral upper and lower extremities, no edema, no clubbing  Neurological: AAOx3, CN Grossly intact  Skin: Normal temperature, warm, dry    MEDICATIONS  (STANDING):  azithromycin  IVPB 500 milliGRAM(s) IV Intermittent every 24 hours  chlorhexidine 0.12% Liquid 15 milliLiter(s) Oral Mucosa every 12 hours  chlorhexidine 2% Cloths 1 Application(s) Topical <User Schedule>  cisatracurium Infusion 3 MICROgram(s)/kG/Min (11.4 mL/Hr) IV Continuous <Continuous>  dextrose 5%. 1000 milliLiter(s) (50 mL/Hr) IV Continuous <Continuous>  dextrose 50% Injectable 12.5 Gram(s) IV Push once  dextrose 50% Injectable 25 Gram(s) IV Push once  dextrose 50% Injectable 25 Gram(s) IV Push once  enoxaparin Injectable 40 milliGRAM(s) SubCutaneous every 24 hours  fentaNYL   Infusion. 0.5 MICROgram(s)/kG/Hr (3.18 mL/Hr) IV Continuous <Continuous>  hydroxychloroquine 400 milliGRAM(s) Oral every 12 hours  hydroxychloroquine 200 milliGRAM(s) Oral every 12 hours  influenza   Vaccine 0.5 milliLiter(s) IntraMuscular once  insulin lispro (HumaLOG) corrective regimen sliding scale   SubCutaneous every 6 hours  midazolam Infusion 0.02 mG/kG/Hr (1.27 mL/Hr) IV Continuous <Continuous>  norepinephrine Infusion 0.05 MICROgram(s)/kG/Min (5.95 mL/Hr) IV Continuous <Continuous>  oseltamivir Suspension 75 milliGRAM(s) Enteral Tube every 12 hours  pantoprazole   Suspension 40 milliGRAM(s) Enteral Tube daily  petrolatum Ophthalmic Ointment 1 Application(s) Both EYES three times a day  piperacillin/tazobactam IVPB.. 3.375 Gram(s) IV Intermittent every 6 hours  potassium chloride   Powder 40 milliEquivalent(s) Enteral Tube once  propofol Infusion 10 MICROgram(s)/kG/Min (3.81 mL/Hr) IV Continuous <Continuous>  vancomycin  IVPB 1000 milliGRAM(s) IV Intermittent every 12 hours    MEDICATIONS  (PRN):  dextrose 40% Gel 15 Gram(s) Oral once PRN Blood Glucose LESS THAN 70 milliGRAM(s)/deciliter  glucagon  Injectable 1 milliGRAM(s) IntraMuscular once PRN Glucose LESS THAN 70 milligrams/deciliter        Allergies    Ceclor (Unknown)    Intolerances        LABS:                         12.4   9.14  )-----------( 249      ( 19 Mar 2020 09:01 )             37.8     03-19    135  |  106  |  8   ----------------------------<  162<H>  3.4<L>   |  18<L>  |  0.63    Ca    7.4<L>      19 Mar 2020 09:01  Phos  2.7     03-19  Mg     1.8     03-19    TPro  4.7<L>  /  Alb  2.3<L>  /  TBili  0.4  /  DBili  x   /  AST  67<H>  /  ALT  38  /  AlkPhos  36<L>  03-19    PT/INR - ( 18 Mar 2020 16:39 )   PT: 15.7 sec;   INR: 1.36          PTT - ( 18 Mar 2020 16:39 )  PTT:36.2 sec    CARDIAC MARKERS ( 19 Mar 2020 09:01 )  x     / x     / 184 U/L / x     / x            Lactate, Blood: 1.0 mmol/L (03-19 @ 09:00)      RADIOLOGY, EKG & ADDITIONAL TESTS: Reviewed. INTERVAL HPI/OVERNIGHT EVENTS:  Intubated at 6 (see chart note from Dr. Fields). NG Tube and jackman placed.     ICU Vital Signs Last 24 Hrs  T(C): 37.7 (19 Mar 2020 10:00), Max: 39.4 (18 Mar 2020 16:40)  T(F): 99.9 (19 Mar 2020 10:00), Max: 102.9 (18 Mar 2020 16:40)  HR: 83 (19 Mar 2020 12:00) (72 - 105)  BP: 103/64 (19 Mar 2020 12:00) (82/53 - 136/89)  BP(mean): 78 (19 Mar 2020 12:00) (63 - 108)  ABP: 81/75 (19 Mar 2020 12:00) (81/75 - 115/59)  ABP(mean): 79 (19 Mar 2020 12:00) (76 - 87)  RR: 30 (19 Mar 2020 12:00) (12 - 50)  SpO2: 99% (19 Mar 2020 12:00) (88% - 100%)    PHYSICAL EXAM:  Constitutional: Intubated and sedated   HEENT: PERRL, EOMI, sclera non-icteric, neck supple, trachea midline, no masses, no JVD, MMM, good dentition  Respiratory: intubated  Cardiovascular: RRR, normal S1S2, no M/R/G  Gastrointestinal: soft, NTND, no masses palpable, BS normal  Extremities: Warm, well perfused, pulses equal bilateral upper and lower extremities, no edema, no clubbing  Neurological: AAOx0, CN Grossly intact  Skin: Normal temperature, bruising in upper thighs    MEDICATIONS  (STANDING):  azithromycin  IVPB 500 milliGRAM(s) IV Intermittent every 24 hours  chlorhexidine 0.12% Liquid 15 milliLiter(s) Oral Mucosa every 12 hours  chlorhexidine 2% Cloths 1 Application(s) Topical <User Schedule>  cisatracurium Infusion 3 MICROgram(s)/kG/Min (11.4 mL/Hr) IV Continuous <Continuous>  dextrose 5%. 1000 milliLiter(s) (50 mL/Hr) IV Continuous <Continuous>  dextrose 50% Injectable 12.5 Gram(s) IV Push once  dextrose 50% Injectable 25 Gram(s) IV Push once  dextrose 50% Injectable 25 Gram(s) IV Push once  enoxaparin Injectable 40 milliGRAM(s) SubCutaneous every 24 hours  fentaNYL   Infusion. 0.5 MICROgram(s)/kG/Hr (3.18 mL/Hr) IV Continuous <Continuous>  hydroxychloroquine 400 milliGRAM(s) Oral every 12 hours  hydroxychloroquine 200 milliGRAM(s) Oral every 12 hours  influenza   Vaccine 0.5 milliLiter(s) IntraMuscular once  insulin lispro (HumaLOG) corrective regimen sliding scale   SubCutaneous every 6 hours  midazolam Infusion 0.02 mG/kG/Hr (1.27 mL/Hr) IV Continuous <Continuous>  norepinephrine Infusion 0.05 MICROgram(s)/kG/Min (5.95 mL/Hr) IV Continuous <Continuous>  oseltamivir Suspension 75 milliGRAM(s) Enteral Tube every 12 hours  pantoprazole   Suspension 40 milliGRAM(s) Enteral Tube daily  petrolatum Ophthalmic Ointment 1 Application(s) Both EYES three times a day  piperacillin/tazobactam IVPB.. 3.375 Gram(s) IV Intermittent every 6 hours  potassium chloride   Powder 40 milliEquivalent(s) Enteral Tube once  propofol Infusion 10 MICROgram(s)/kG/Min (3.81 mL/Hr) IV Continuous <Continuous>  vancomycin  IVPB 1000 milliGRAM(s) IV Intermittent every 12 hours    MEDICATIONS  (PRN):  dextrose 40% Gel 15 Gram(s) Oral once PRN Blood Glucose LESS THAN 70 milliGRAM(s)/deciliter  glucagon  Injectable 1 milliGRAM(s) IntraMuscular once PRN Glucose LESS THAN 70 milligrams/deciliter          Allergies    Ceclor (Unknown)    Intolerances        LABS:                         12.4   9.14  )-----------( 249      ( 19 Mar 2020 09:01 )             37.8     03-19    135  |  106  |  8   ----------------------------<  162<H>  3.4<L>   |  18<L>  |  0.63    Ca    7.4<L>      19 Mar 2020 09:01  Phos  2.7     03-19  Mg     1.8     03-19    TPro  4.7<L>  /  Alb  2.3<L>  /  TBili  0.4  /  DBili  x   /  AST  67<H>  /  ALT  38  /  AlkPhos  36<L>  03-19    PT/INR - ( 18 Mar 2020 16:39 )   PT: 15.7 sec;   INR: 1.36          PTT - ( 18 Mar 2020 16:39 )  PTT:36.2 sec    CARDIAC MARKERS ( 19 Mar 2020 09:01 )  x     / x     / 184 U/L / x     / x            Lactate, Blood: 1.0 mmol/L (03-19 @ 09:00)      RADIOLOGY, EKG & ADDITIONAL TESTS: Reviewed.

## 2020-03-19 NOTE — CHART NOTE - NSCHARTNOTEFT_GEN_A_CORE
Pt noted to become increasingly tachypneic. Treated with IV Ativan 0.5mg x2 initially without improvement. Spoke to patient and mother regarding need to intubate due to deteriorating clinical status with increasing tachypnea, belly-breathing, and desaturation to mid-80s. Patient extremely anxious and initially reluctant to be intubated. Deemed to lack capacity due to inability to explain that alternative to intubation includes death and decision deferred to mother who consented to intubation. Pt noted to become increasingly tachypneic. Treated with IV Ativan 0.5mg x2 initially without improvement. Spoke to patient and mother regarding need to intubate due to deteriorating clinical status with increasing tachypnea, belly-breathing, and desaturation to mid-80s. Patient extremely anxious and initially reluctant to be intubated. Deemed to lack capacity due to inability to explain that alternative to intubation includes death and decision deferred to mother who consented to intubation.    attending; see in addition progress note this morning.

## 2020-03-19 NOTE — DIETITIAN INITIAL EVALUATION ADULT. - ADD RECOMMEND
1. Please see EN recs above. Will adjust goal rate pending propofol titration *endorsed to MD 2. Monitor lytes and replete prn. POC BG q6hrs 3. Pain and bowel regimens per team 4. Recommend MVI

## 2020-03-19 NOTE — PROCEDURE NOTE - ADDITIONAL PROCEDURE DETAILS
Called to intubate this patient. Pre- O2 with 100% O2.  Propofol and Succinylcholine given. Rapid sequence intubation done using Glidescope S3.  Vocal cords seen, ETT place atraumatically, attempt x1.  Breath sounds (+) bilateral. EzCap (EtCO2) (+). ETT taped.  CXR pending.

## 2020-03-19 NOTE — DIETITIAN INITIAL EVALUATION ADULT. - ENTERAL
At current rate of propofol, recommend starting Jevity 1.5 Michael @ 23mL/hr x 24hrs plus ProStat TID (300 kcal, 45g protein) via NGT. Provides: 552mL TV, 1128kcal (+504kcal from propofol), 80g pro, 420ml free H2O, 55% RDI, 1.41g/kg IBW protein, 23npc/kg IBW. Recommend starting at 20mL/hr and advancing by [id number] to goal as tolerated. Additional free H2O per team. Monitor for s/s intolerance; maintain aspiration precautions at all times.

## 2020-03-19 NOTE — DIETITIAN INITIAL EVALUATION ADULT. - OTHER INFO
Giacomo Frias is a 35 year old male who presents with ED and a low-normal testosterone level.  I was asked by Dr. Mendes to see him in consultation.    HPI: Giacomo was found to have a low-normal total testosterone level in July after he saw his PCP and discussed ED.    He reports that he has had some ED for the past ~8 months. He was losing erections during intercourse although it is better for the past couple of months although he says his erections are not as full as they used to be.    His libido is good.    His energy levels are lower. He has no motivation to work out.    He has noticed no change in testicular size. For the past 6 months, he has had some pain (up to 7-8/10) in his R testicular (~once/week). He says it feels like a pulled muscle.    He has had increased stress related to work. He has a temporary position at his company since his position was eliminated. His company is looking for a more permanent position for him. He sleeps ~7 hours/night.    Takes no supplements    No history of anabolic steroid use  No history of testosterone booster or supplement use  No history of excessive EtOH/illicit drug use  No history of chronic opioid use    No history of groin trauma  2 children (7 and 8 years old) - no difficulty getting his wife conceiving    No changes in breast tissue size/breast milk production    Weight change: 20-lb weight gain over the past year (had lost 30 lbs in [id number])    No vision changes  No HA    No increased thirst or frequency of urination    No history of prostate cancer or enlargement  No history of blood clots or stroke    FH: No known testosterone, thyroid, or pituitary disease         No prostate cancer         No blood clots or stroke         Father - type 2 DM    Labs:  Component TOTAL TESTOSTERONE   Latest Ref Rng & Units 200 - 813 ng/dL   7/23/2019      7:04      Component TSH   Latest Ref Rng & Units 0.30 - 4.82 mIU/L   7/23/2019 2.27       Radiology (see  Imaging tab and Care Everywhere in Epic for full reports): None relevant      Past Medical History:   Diagnosis Date   • Hypertriglyceridemia 5/16/2017       No past surgical history on file.    ALLERGIES: no known allergies.     Current Outpatient Medications   Medication Sig Dispense Refill   • albuterol 108 (90 Base) MCG/ACT inhaler Take 2 inhalations every 4 hours as needed 1 Inhaler 0     No current facility-administered medications for this visit.        No family history on file.    Social History     Socioeconomic History   • Marital status: /Civil Union     Spouse name: Not on file   • Number of children: 2   • Years of education: Not on file   • Highest education level: Not on file   Occupational History   • Occupation: [employer]   Social Needs   • Financial resource strain: Not on file   • Food insecurity:     Worry: Not on file     Inability: Not on file   • Transportation needs:     Medical: Not on file     Non-medical: Not on file   Tobacco Use   • Smoking status: Never Smoker   • Smokeless tobacco: Never Used   Substance and Sexual Activity   • Alcohol use: Yes   • Drug use: Not on file   • Sexual activity: Not on file   Lifestyle   • Physical activity:     Days per week: Not on file     Minutes per session: Not on file   • Stress: Not on file   Relationships   • Social connections:     Talks on phone: Not on file     Gets together: Not on file     Attends Jain service: Not on file     Active member of club or organization: Not on file     Attends meetings of clubs or organizations: Not on file     Relationship status: Not on file   • Intimate partner violence:     Fear of current or ex partner: Not on file     Emotionally abused: Not on file     Physically abused: Not on file     Forced sexual activity: Not on file   Other Topics Concern   • Not on file   Social History Narrative   • Not on file       ROS:  Constitutional: No fevers, chills   Eyes: No blurred vision, other changes in  vision  Respiratory: No SOB, cough, wheezing, snoring  CV: No chest pain, leg swelling   GI: No abdo pain, nausea/vomiting  : No dysuria, urinary frequency  Skin: No rashes, ulcers, sores, changes in pigmentation  Psych: No depression, anxiety  All other systems reviewed are negative    Vitals: /76   Pulse 110   Wt 93.4 kg (206 lb)   BMI 30.42 kg/m²   BSA 2.09 m²     O/E:  General appearance: healthy, alert and overweight, does not appear cushingoid or acromegalic  HEENT:PERRLA, no proptosis, exophthalmous, lid retraction, or lid lag and no widely-spaced teeth, enlarged jaw, or frontal bossing, oropharynx clear  Neck: supple and thyroid not palpable, no palpable nodules  Chest: clear bilaterally and no gynecomastia  CV: RRR, S1 S2 and no murmurs  Abdo:central adiposity, soft and non-tender   : Normal penis, no palpable mass on testes, R testis: ~18.5 mL, L testis: ~20 mL  Extr: no edema; no cyanosis or clubbing  Neuro: No tremor of outstretched hands, normal reflexes  Skin: normal, few striae on abdomen  Lymph: no cervical lymphadenopathy    A/P: Giacomo Frias is a 35 year old male with ED and a low-normal testosterone level    Repeat testosterone (free and total) levels along with FSH, LH, prolactin, and TSH/FT4 concentrations.    If his testosterone level is confirmed to be low, I recommend he starts testosterone replacement. We discussed this in detail.    Benefits of testosterone: Increased energy, better libido, improved erections, lower risk of osteoporosis    Potential adverse effects: Swelling, increased breast tissue, possible increased growth of prostate CA if it is already present (but does not lead to prostate CA), increased red blood cell production (which we monitor), DVT, possible cardiovascular disease    Methods of replacement:  1. Gel (Androgel, Testim, Axiron) - rub daily on shoulders and chest; careful not to touch anyone immediately afterwards or let them touch where it was  applied  2. Patch (Androderm) - change daily; sometimes falls off easily or causes rash due to the adhesive  3. Injection - every 2-3 weeks      RTC PRN based on test results and management plan    Electronically signed by: Henrique Foster MD  11/12/2019   28 yo/female with PMHx recent recurrent PNA, presented w/fever, cough, and SOB. Admitted for R/O COVID vs. Flu vs. post-viral PNA. Pt intubated early in AM on 3/19 2/2 worsening tachypnea and desaturation to low/mid 80s. Pt discussed during MICU rounds. Unable to conduct a face to face interview or nutrition-focused physical exam due to limited contact restrictions related to the pt's medical condition and isolation precautions. She is intubated on VC/AC mode, sedated on propofol @ 19.1mL/hr (504kcal/day from lipids), fentanyl, versed. Paralyzed on nimbex for improving synchrony w/vent. MAP 89- not requiring levophed at time of visit. NPO w/NGT in place; no plan to start feeds yet per MD. Last BM 3/18 per flowsheet. Unable to assess for nutrition hx or assess for complaints at this time. NKFA. Skin intact pressure-wise. Will continue to follow per RD protocol.

## 2020-03-19 NOTE — PROGRESS NOTE ADULT - ASSESSMENT
Ms. Gudino is a 29 year old female with a past medical history of recurrent pneumonia who presents with fever, cough, and shortness of breath. She was admitted to the MICU for acute hypoxic respiratory distress 2/2 influenza A vs. post-viral PNA vs. COVID infection.    Pulmonary:  #Sepsis 2/2 Rule out COVID+ vs. lobar PNA vs. Influenza  - Flu A positive at outside clinic on tamiflu and Levaquin  - on arrival meeting 3/4 SIRS criteria (RUL and possible LLL) with pulmonary source  - WBC 8.29  with 54.8% neutrophils, 4.4% lymphocytes, lactate 1.8  - s/p vanc+zosyn, levaquin  750 mg, 1 g APAP 1 L NS bolus in ED  - Continue Vanc/Zosyn  - ID approval for Hydroxychloroquine and Tocilizumab   - Order Tylenol 975 mg q6h standing to mediate cytokine storm if continually febrile after Tocilizumab dose  - f/u T cell subset  - obtain HIV consent for testing when able  - UA , f/u UCx  - f/u BCx    #R/O COVID  - ID consulted, f/u recs  - hydroxychloroquine 400mg q12h x2 doses then 200mg q12h (ID approved)  - Kaletra no longer available  - tociluzumab 560mg IV x 1 dose (ID approved)  - daily CBC/CMP/Mg/Phos/CRP  - q3d labs: ESR, Tcell subset, d-dimer, LDH, ferritin, CK, trop, coags  - COVID isolation protocol  - obtain consent for HIV testing when able    #Hypoxic respiratory failure 2/2 ARDS 2/2 rule out COVID infection  - CXR on arrival with BL multilobar interstitial opacities  - O2sat 100% RA  - ABG on arrival: pH 7.29, pCO2 37, pO2 19, HCO3 18  - repeat ABG post-intubation   - continue to trend ABG with vent adjustments    #Impending ARDS  - as above  - anticipate intubation once patient arrives on floor  - low threshold to prone pt in order to improve oxygenation and secretion drainage  - continue to closely monitor pt    Cardiovascular:  #Hypotension  - Currently maintaining MAP >65  - Anticipate pressure support with likely intubation    Neurology:  #DEEDEE  -anticipate intubation    Gastrointestinal:  #Prophylaxis  - Protonix 40mg IV daily while intubated    Genitourinary:  #Decreased urine output  - place jackman with intubation  - continue to monitor strict I&O  - F/U UA   - F/U daily Cr on BMP    ID:  #Sepsis 2/2 COVID+ vs. Influenxa A vs. Lobar PNA  - as above  - Continue Tamiflu daily, Zosyn/Vanc, DC levaquin  - F/U UA   - f/u BCx, UCx  - ID approval for tocilizumab, hydroxychloroquine f/u recs    F: s/p 1 L NS in ED   E: Replete K<4, Mg<2  N: NPO   G: protonix 40mg IV daily    Code: FULL CODE    Dispo: Patient requires continued monitoring in MICU Ms. Gudino is a 29 year old female with a past medical history of recurrent pneumonia who presents with fever, cough, and shortness of breath. She was admitted to the MICU for acute hypoxic respiratory distress 2/2 influenza A vs. post-viral PNA vs. COVID infection.    Pulmonary:  #Sepsis 2/2 Rule out COVID vs. lobar PNA vs. Influenza  - Flu A positive at outside clinic on tamiflu and Levaquin  - Continue Vanc/Zosyn  - ID approval for Hydroxychloroquine and Tocilizumab   - Order Tylenol 975 mg q6h standing to mediate cytokine storm if continually febrile after Tocilizumab dose  - obtain HIV consent for testing when able  - UA , f/u UCx  - f/u BCx    #R/O COVID  - ID consulted, f/u recs  - hydroxychloroquine 400mg q12h x2 doses then 200mg q12h (ID approved)  - Kaletra no longer available  - tociluzumab 560mg IV x 1 dose (ID approved)  - daily CBC/CMP/Mg/Phos/CRP  - q3d labs: ESR, Tcell subset, d-dimer, LDH, ferritin, CK, trop, coags  - COVID isolation protocol  - obtain consent for HIV testing when able    #Hypoxic respiratory failure 2/2 ARDS 2/2 rule out COVID infection  - CXR on arrival with BL multilobar interstitial opacities  - O2sat 100% RA  - ABG on arrival: pH 7.29, pCO2 37, pO2 19, HCO3 18  - repeat ABG post-intubation   - continue to trend ABG with vent adjustments    #Impending ARDS  - as above  - anticipate intubation once patient arrives on floor  - low threshold to prone pt in order to improve oxygenation and secretion drainage  - continue to closely monitor pt    Cardiovascular:  #Hypotension  - Currently maintaining MAP >65  - Anticipate pressure support with likely intubation    Neurology:  #DEEDEE  -anticipate intubation    Gastrointestinal:  #Prophylaxis  - Protonix 40mg IV daily while intubated    Genitourinary:  #Decreased urine output  - place jackman with intubation  - continue to monitor strict I&O  - F/U UA   - F/U daily Cr on BMP    ID:  #Sepsis 2/2 COVID+ vs. Influenxa A vs. Lobar PNA  - as above  - Continue Tamiflu daily, Zosyn/Vanc, DC levaquin  - F/U UA   - f/u BCx, UCx  - ID approval for tocilizumab, hydroxychloroquine f/u recs    F: s/p 1 L NS in ED   E: Replete K<4, Mg<2  N: NPO   G: protonix 40mg IV daily    Code: FULL CODE    Dispo: Patient requires continued monitoring in MICU Ms. Gudino is a 29 year old female with a past medical history of recurrent pneumonia who presents with fever, cough, and shortness of breath. She was admitted to the MICU for acute hypoxic respiratory distress 2/2 influenza A vs. post-viral PNA vs. COVID infection.    Pulmonary:  #Acute Hypoxic Respiratory Failure  -Tachypneic and hypoxic with increased work of breathing; intubated at bedside in AM  -ABG pH 7.28, pCO2 38, pO2 98, HCO3 19 following intubation  -Morning CXR concerning for ARDS (bilateral diffuse infiltrates)  -Continue to monitor respiratory status    #Sepsis 2/2 Rule out COVID vs. lobar PNA vs. Influenza  -Tachypneic and hypoxic with increased work of breathing; intubated at bedside in AM  -ABG   -Morning CXR concerning for ARDS (bilateral diffuse infiltrates)  - Flu A positive at outside clinic on tamiflu and Levaquin  - Continue Vanc/Zosyn  - Tamiflu x 2 more days (last dose 3/21)  - ID approval for Hydroxychloroquine x 4 days and Tocilizumab x 1   - Order Tylenol 975 mg q6h standing to mediate cytokine storm if continually febrile after Tocilizumab dose  - obtain HIV consent for testing when able  - UA , f/u UCx  - f/u BCx    #R/O COVID  - ID approval for meds  - hydroxychloroquine 400mg q12h x2 doses then 200mg q12h (ID approved)  - Kaletra no longer available  - tociluzumab 560mg IV x 1 dose (ID approved)  - daily CBC/CMP/Mg/Phos/CRP  - q3d labs: ESR, Tcell subset, d-dimer, LDH, ferritin, CK, trop, coags  - COVID isolation protocol  - obtain consent for HIV testing when able    #Hypoxic respiratory failure 2/2 ARDS 2/2 rule out COVID infection  - CXR on arrival with BL multilobar interstitial opacities  - O2sat 100% RA  - ABG on arrival: pH 7.29, pCO2 37, pO2 19, HCO3 18  - repeat ABG post-intubation   - continue to trend ABG with vent adjustments    #Impending ARDS  - as above  - anticipate intubation once patient arrives on floor  - low threshold to prone pt in order to improve oxygenation and secretion drainage  - continue to closely monitor pt    Cardiovascular:  #Hypotension  - Currently maintaining MAP >65  - Anticipate pressure support with likely intubation    Neurology:  #DEEDEE  -Sedated and intubated    Gastrointestinal:  #Prophylaxis  - Protonix 40mg IV daily while intubated    Genitourinary:  #Decreased urine output  - place jackman with intubation  - continue to monitor strict I&O  - F/U UA   - F/U daily Cr on BMP    ID:  #Sepsis 2/2 COVID+ vs. Influenxa A vs. Lobar PNA  - as above  - Continue Tamiflu daily, Zosyn/Vanc, DC levaquin  - F/U UA   - f/u BCx, UCx  - ID approval for tocilizumab x 1, hydroxychloroquine f/u recs    F: None  E: Replete K<4, Mg<2  N: NPO   G: protonix 40mg IV daily    Code: FULL CODE    Dispo: Patient requires continued monitoring in MICU

## 2020-03-19 NOTE — DIETITIAN INITIAL EVALUATION ADULT. - ENERGY NEEDS
Height 65"; ABW 63.5kg; IBW 56.8kg; 112%IBW  BMI 23.3  IBW used to estimate needs 2/2 vent. Needs estimate for age and adjusted for vent. Fluids per team

## 2020-03-20 LAB
ALBUMIN SERPL ELPH-MCNC: 2 G/DL — LOW (ref 3.3–5)
ALP SERPL-CCNC: 40 U/L — SIGNIFICANT CHANGE UP (ref 40–120)
ALT FLD-CCNC: 37 U/L — SIGNIFICANT CHANGE UP (ref 10–45)
ANION GAP SERPL CALC-SCNC: 9 MMOL/L — SIGNIFICANT CHANGE UP (ref 5–17)
ANISOCYTOSIS BLD QL: SLIGHT — SIGNIFICANT CHANGE UP
AST SERPL-CCNC: 59 U/L — HIGH (ref 10–40)
BASE EXCESS BLDA CALC-SCNC: -7.2 MMOL/L — LOW (ref -2–3)
BILIRUB SERPL-MCNC: 0.3 MG/DL — SIGNIFICANT CHANGE UP (ref 0.2–1.2)
BUN SERPL-MCNC: 7 MG/DL — SIGNIFICANT CHANGE UP (ref 7–23)
BURR CELLS BLD QL SMEAR: SIGNIFICANT CHANGE UP
CALCIUM SERPL-MCNC: 7.6 MG/DL — LOW (ref 8.4–10.5)
CHLORIDE SERPL-SCNC: 103 MMOL/L — SIGNIFICANT CHANGE UP (ref 96–108)
CK SERPL-CCNC: 77 U/L — SIGNIFICANT CHANGE UP (ref 25–170)
CO2 SERPL-SCNC: 18 MMOL/L — LOW (ref 22–31)
CREAT SERPL-MCNC: 0.73 MG/DL — SIGNIFICANT CHANGE UP (ref 0.5–1.3)
CRP SERPL-MCNC: 11.66 MG/DL — HIGH (ref 0–0.4)
GLUCOSE BLDC GLUCOMTR-MCNC: 108 MG/DL — HIGH (ref 70–99)
GLUCOSE BLDC GLUCOMTR-MCNC: 68 MG/DL — LOW (ref 70–99)
GLUCOSE BLDC GLUCOMTR-MCNC: 88 MG/DL — SIGNIFICANT CHANGE UP (ref 70–99)
GLUCOSE BLDC GLUCOMTR-MCNC: 99 MG/DL — SIGNIFICANT CHANGE UP (ref 70–99)
GLUCOSE SERPL-MCNC: 101 MG/DL — HIGH (ref 70–99)
HCO3 BLDA-SCNC: 19 MMOL/L — LOW (ref 21–28)
HCT VFR BLD CALC: 38.6 % — SIGNIFICANT CHANGE UP (ref 34.5–45)
HGB BLD-MCNC: 12.3 G/DL — SIGNIFICANT CHANGE UP (ref 11.5–15.5)
LYMPHOCYTES # BLD AUTO: 8 % — LOW (ref 13–44)
MACROCYTES BLD QL: SLIGHT — SIGNIFICANT CHANGE UP
MAGNESIUM SERPL-MCNC: 1.8 MG/DL — SIGNIFICANT CHANGE UP (ref 1.6–2.6)
MANUAL DIF COMMENT BLD-IMP: SIGNIFICANT CHANGE UP
MANUAL SMEAR VERIFICATION: SIGNIFICANT CHANGE UP
MCHC RBC-ENTMCNC: 30.5 PG — SIGNIFICANT CHANGE UP (ref 27–34)
MCHC RBC-ENTMCNC: 31.9 GM/DL — LOW (ref 32–36)
MCV RBC AUTO: 95.8 FL — SIGNIFICANT CHANGE UP (ref 80–100)
MYELOCYTES NFR BLD: 1 % — HIGH
NEUTROPHILS NFR BLD AUTO: 89 % — HIGH (ref 43–77)
NEUTS BAND # BLD: 2 % — SIGNIFICANT CHANGE UP
NRBC # BLD: 0 /100 WBCS — SIGNIFICANT CHANGE UP (ref 0–0)
OVALOCYTES BLD QL SMEAR: SLIGHT — SIGNIFICANT CHANGE UP
PCO2 BLDA: 40 MMHG — SIGNIFICANT CHANGE UP (ref 32–45)
PH BLDA: 7.29 — LOW (ref 7.35–7.45)
PHOSPHATE SERPL-MCNC: 2.5 MG/DL — SIGNIFICANT CHANGE UP (ref 2.5–4.5)
PLAT MORPH BLD: NORMAL — SIGNIFICANT CHANGE UP
PLATELET # BLD AUTO: 260 K/UL — SIGNIFICANT CHANGE UP (ref 150–400)
PO2 BLDA: 80 MMHG — LOW (ref 83–108)
POTASSIUM SERPL-MCNC: 4.9 MMOL/L — SIGNIFICANT CHANGE UP (ref 3.5–5.3)
POTASSIUM SERPL-SCNC: 4.9 MMOL/L — SIGNIFICANT CHANGE UP (ref 3.5–5.3)
PROCALCITONIN SERPL-MCNC: 11.04 NG/ML — HIGH (ref 0.02–0.1)
PROT SERPL-MCNC: 4.7 G/DL — LOW (ref 6–8.3)
RBC # BLD: 4.03 M/UL — SIGNIFICANT CHANGE UP (ref 3.8–5.2)
RBC # FLD: 15.2 % — HIGH (ref 10.3–14.5)
RBC BLD AUTO: ABNORMAL
SAO2 % BLDA: 95 % — SIGNIFICANT CHANGE UP (ref 95–100)
SARS-COV-2 RNA SPEC QL NAA+PROBE: DETECTED
SMUDGE CELLS # BLD: PRESENT — SIGNIFICANT CHANGE UP
SODIUM SERPL-SCNC: 130 MMOL/L — LOW (ref 135–145)
TRIGL SERPL-MCNC: 809 MG/DL — HIGH (ref 10–149)
WBC # BLD: 7.62 K/UL — SIGNIFICANT CHANGE UP (ref 3.8–10.5)
WBC # FLD AUTO: 7.62 K/UL — SIGNIFICANT CHANGE UP (ref 3.8–10.5)

## 2020-03-20 PROCEDURE — 99291 CRITICAL CARE FIRST HOUR: CPT

## 2020-03-20 RX ORDER — ACETAMINOPHEN 500 MG
1000 TABLET ORAL ONCE
Refills: 0 | Status: COMPLETED | OUTPATIENT
Start: 2020-03-20 | End: 2020-03-20

## 2020-03-20 RX ORDER — DEXTROSE 50 % IN WATER 50 %
50 SYRINGE (ML) INTRAVENOUS ONCE
Refills: 0 | Status: COMPLETED | OUTPATIENT
Start: 2020-03-20 | End: 2020-03-20

## 2020-03-20 RX ORDER — ACETAMINOPHEN 500 MG
650 TABLET ORAL ONCE
Refills: 0 | Status: COMPLETED | OUTPATIENT
Start: 2020-03-20 | End: 2020-03-20

## 2020-03-20 RX ORDER — FUROSEMIDE 40 MG
20 TABLET ORAL ONCE
Refills: 0 | Status: COMPLETED | OUTPATIENT
Start: 2020-03-20 | End: 2020-03-20

## 2020-03-20 RX ORDER — HYDROXYCHLOROQUINE SULFATE 200 MG
200 TABLET ORAL EVERY 12 HOURS
Refills: 0 | Status: COMPLETED | OUTPATIENT
Start: 2020-03-20 | End: 2020-03-24

## 2020-03-20 RX ORDER — MAGNESIUM SULFATE 500 MG/ML
1 VIAL (ML) INJECTION ONCE
Refills: 0 | Status: COMPLETED | OUTPATIENT
Start: 2020-03-20 | End: 2020-03-20

## 2020-03-20 RX ADMIN — Medication 1000 MILLIGRAM(S): at 07:21

## 2020-03-20 RX ADMIN — Medication 102 MILLIGRAM(S): at 05:01

## 2020-03-20 RX ADMIN — Medication 153 MILLIGRAM(S): at 22:29

## 2020-03-20 RX ADMIN — PIPERACILLIN AND TAZOBACTAM 100 GRAM(S): 4; .5 INJECTION, POWDER, LYOPHILIZED, FOR SOLUTION INTRAVENOUS at 07:00

## 2020-03-20 RX ADMIN — Medication 102 MILLIGRAM(S): at 17:37

## 2020-03-20 RX ADMIN — Medication 400 MILLIGRAM(S): at 01:00

## 2020-03-20 RX ADMIN — Medication 20 MILLIGRAM(S): at 20:46

## 2020-03-20 RX ADMIN — CHLORHEXIDINE GLUCONATE 15 MILLILITER(S): 213 SOLUTION TOPICAL at 17:37

## 2020-03-20 RX ADMIN — MIDAZOLAM HYDROCHLORIDE 1.27 MG/KG/HR: 1 INJECTION, SOLUTION INTRAMUSCULAR; INTRAVENOUS at 06:07

## 2020-03-20 RX ADMIN — PIPERACILLIN AND TAZOBACTAM 100 GRAM(S): 4; .5 INJECTION, POWDER, LYOPHILIZED, FOR SOLUTION INTRAVENOUS at 16:31

## 2020-03-20 RX ADMIN — Medication 20 MILLIGRAM(S): at 10:14

## 2020-03-20 RX ADMIN — Medication 5.95 MICROGRAM(S)/KG/MIN: at 05:01

## 2020-03-20 RX ADMIN — Medication 650 MILLIGRAM(S): at 00:40

## 2020-03-20 RX ADMIN — ENOXAPARIN SODIUM 40 MILLIGRAM(S): 100 INJECTION SUBCUTANEOUS at 21:34

## 2020-03-20 RX ADMIN — Medication 100 GRAM(S): at 08:29

## 2020-03-20 RX ADMIN — PIPERACILLIN AND TAZOBACTAM 100 GRAM(S): 4; .5 INJECTION, POWDER, LYOPHILIZED, FOR SOLUTION INTRAVENOUS at 21:01

## 2020-03-20 RX ADMIN — FENTANYL CITRATE 3.18 MICROGRAM(S)/KG/HR: 50 INJECTION INTRAVENOUS at 00:06

## 2020-03-20 RX ADMIN — PROPOFOL 3.81 MICROGRAM(S)/KG/MIN: 10 INJECTION, EMULSION INTRAVENOUS at 10:14

## 2020-03-20 RX ADMIN — PROPOFOL 3.81 MICROGRAM(S)/KG/MIN: 10 INJECTION, EMULSION INTRAVENOUS at 20:46

## 2020-03-20 RX ADMIN — AZITHROMYCIN 255 MILLIGRAM(S): 500 TABLET, FILM COATED ORAL at 05:46

## 2020-03-20 RX ADMIN — PIPERACILLIN AND TAZOBACTAM 100 GRAM(S): 4; .5 INJECTION, POWDER, LYOPHILIZED, FOR SOLUTION INTRAVENOUS at 01:01

## 2020-03-20 RX ADMIN — Medication 200 MILLIGRAM(S): at 11:22

## 2020-03-20 RX ADMIN — Medication 1 APPLICATION(S): at 21:33

## 2020-03-20 RX ADMIN — FENTANYL CITRATE 3.18 MICROGRAM(S)/KG/HR: 50 INJECTION INTRAVENOUS at 11:21

## 2020-03-20 RX ADMIN — Medication 153 MILLIGRAM(S): at 17:38

## 2020-03-20 RX ADMIN — Medication 1 APPLICATION(S): at 05:01

## 2020-03-20 RX ADMIN — PANTOPRAZOLE SODIUM 40 MILLIGRAM(S): 20 TABLET, DELAYED RELEASE ORAL at 11:22

## 2020-03-20 RX ADMIN — Medication 75 MILLIGRAM(S): at 00:09

## 2020-03-20 RX ADMIN — Medication 400 MILLIGRAM(S): at 06:41

## 2020-03-20 RX ADMIN — Medication 153 MILLIGRAM(S): at 11:22

## 2020-03-20 RX ADMIN — PROPOFOL 3.81 MICROGRAM(S)/KG/MIN: 10 INJECTION, EMULSION INTRAVENOUS at 05:01

## 2020-03-20 RX ADMIN — CHLORHEXIDINE GLUCONATE 15 MILLILITER(S): 213 SOLUTION TOPICAL at 05:02

## 2020-03-20 RX ADMIN — Medication 75 MILLIGRAM(S): at 12:55

## 2020-03-20 RX ADMIN — Medication 75 MILLIGRAM(S): at 23:46

## 2020-03-20 RX ADMIN — PROPOFOL 3.81 MICROGRAM(S)/KG/MIN: 10 INJECTION, EMULSION INTRAVENOUS at 00:06

## 2020-03-20 RX ADMIN — CISATRACURIUM BESYLATE 11.4 MICROGRAM(S)/KG/MIN: 2 INJECTION INTRAVENOUS at 11:18

## 2020-03-20 RX ADMIN — Medication 50 MILLILITER(S): at 05:18

## 2020-03-20 RX ADMIN — CHLORHEXIDINE GLUCONATE 1 APPLICATION(S): 213 SOLUTION TOPICAL at 05:02

## 2020-03-20 RX ADMIN — Medication 650 MILLIGRAM(S): at 01:10

## 2020-03-20 RX ADMIN — Medication 1 APPLICATION(S): at 14:00

## 2020-03-20 RX ADMIN — Medication 153 MILLIGRAM(S): at 05:01

## 2020-03-20 NOTE — PROGRESS NOTE ADULT - ASSESSMENT
Ms. Gudino is a 29 year old female with a past medical history of recurrent pneumonia who presents with fever, cough, and shortness of breath. She was admitted to the MICU for acute hypoxic respiratory distress 2/2 influenza A vs. post-viral PNA vs. COVID infection.    Pulmonary:  #Acute Hypoxic Respiratory Failure  -Tachypneic and hypoxic with increased work of breathing; intubated at bedside in AM  -ABG pH 7.28, pCO2 38, pO2 98, HCO3 19 following intubation  -Morning CXR concerning for ARDS (bilateral diffuse infiltrates)  -Continue to monitor respiratory status    #Sepsis 2/2 Rule out COVID vs. lobar PNA vs. Influenza  -Tachypneic and hypoxic with increased work of breathing; intubated at bedside in AM  -ABG   -Morning CXR concerning for ARDS (bilateral diffuse infiltrates)  - Flu A positive at outside clinic on tamiflu and Levaquin  - Continue Vanc/Zosyn  - Tamiflu x 2 more days (last dose 3/21)  - ID approval for Hydroxychloroquine x 4 days and Tocilizumab x 1   - Order Tylenol 975 mg q6h standing to mediate cytokine storm if continually febrile after Tocilizumab dose  - obtain HIV consent for testing when able  - UA , f/u UCx  - f/u BCx    #R/O COVID  - ID approval for meds  - hydroxychloroquine 400mg q12h x2 doses then 200mg q12h (ID approved)  - Kaletra no longer available  - tociluzumab 560mg IV x 1 dose (ID approved)  - daily CBC/CMP/Mg/Phos/CRP  - q3d labs: ESR, Tcell subset, d-dimer, LDH, ferritin, CK, trop, coags  - COVID isolation protocol  - obtain consent for HIV testing when able    #Hypoxic respiratory failure 2/2 ARDS 2/2 rule out COVID infection  - CXR on arrival with BL multilobar interstitial opacities  - O2sat 100% RA  - ABG on arrival: pH 7.29, pCO2 37, pO2 19, HCO3 18  - repeat ABG post-intubation   - continue to trend ABG with vent adjustments    #Impending ARDS  - as above  - anticipate intubation once patient arrives on floor  - low threshold to prone pt in order to improve oxygenation and secretion drainage  - continue to closely monitor pt    Cardiovascular:  #Hypotension  - Currently maintaining MAP >65  - Anticipate pressure support with likely intubation    Neurology:  #DEEDEE  -Sedated and intubated    Gastrointestinal:  #Prophylaxis  - Protonix 40mg IV daily while intubated    Genitourinary:  #Decreased urine output  - place jackman with intubation  - continue to monitor strict I&O  - F/U UA   - F/U daily Cr on BMP    ID:  #Sepsis 2/2 COVID+ vs. Influenxa A vs. Lobar PNA  - as above  - Continue Tamiflu daily, Zosyn/Vanc, DC levaquin  - F/U UA   - f/u BCx, UCx  - ID approval for tocilizumab x 1, hydroxychloroquine f/u recs    F: None  E: Replete K<4, Mg<2  N: NPO   G: protonix 40mg IV daily    Code: FULL CODE    Dispo: Patient requires continued monitoring in MICU Ms. Gudino is a 29 year old female with a past medical history of recurrent pneumonia who presents with fever, cough, and shortness of breath. She was admitted to the MICU for acute hypoxic respiratory distress 2/2 influenza A vs. post-viral PNA vs. COVID infection.    Pulmonary:  #Acute Hypoxic Respiratory Failure  -Tachypneic and hypoxic with increased work of breathing; intubated at bedside in AM  -ABG pH 7.28, pCO2 38, pO2 98, HCO3 19 following intubation  -Morning CXR concerning for ARDS (bilateral diffuse infiltrates)  -Continue to monitor respiratory status  -Current vent settings: 40%/350/30/12    #Sepsis 2/2 Rule out COVID vs. lobar PNA vs. Influenza  -Tachypneic and hypoxic with increased work of breathing; intubated at bedside in AM  -ABG   -Morning CXR concerning for ARDS (bilateral diffuse infiltrates)  - Flu A positive at outside clinic on tamiflu and Levaquin  - Continue Vanc/Zosyn  - Tamiflu x 2 more days (last dose 3/21)  - ID approval for Hydroxychloroquine x 4 days and Tocilizumab x 1   - Order Tylenol 975 mg q6h standing to mediate cytokine storm if continually febrile after Tocilizumab dose  - obtain HIV consent for testing when able  - UA , f/u UCx  - f/u BCx    #R/O COVID  - ID approval for meds  - hydroxychloroquine 400mg q12h x2 doses then 200mg q12h (ID approved)  - Kaletra no longer available  - tociluzumab 560mg IV x 1 dose (ID approved)  - daily CBC/CMP/Mg/Phos/CRP  - q3d labs: ESR, Tcell subset, d-dimer, LDH, ferritin, CK, trop, coags  - COVID isolation protocol  - obtain consent for HIV testing when able    #Hypoxic respiratory failure 2/2 ARDS 2/2 rule out COVID infection  - CXR on arrival with BL multilobar interstitial opacities  - O2sat 100% RA  - ABG on arrival: pH 7.29, pCO2 37, pO2 19, HCO3 18  - s/p intubation 3/19  - continue to trend ABG with vent adjustments    #Impending ARDS  - as above  - s/p intubation 3/19  - low threshold to prone pt in order to improve oxygenation and secretion drainage  - continue to closely monitor pt    Cardiovascular:  #Hypotension  - Currently maintaining MAP >65  - Anticipate pressure support with likely intubation    Neurology:  #DEEDEE  -Sedated and intubated    Gastrointestinal:  #Prophylaxis  - Protonix 40mg IV daily while intubated    Genitourinary:  #Decreased urine output  - place jackman with intubation  - continue to monitor strict I&O  - F/U UA   - F/U daily Cr on BMP    ID:  #Sepsis 2/2 COVID+ vs. Influenxa A vs. Lobar PNA  - as above  - MRSA swab negative, vancomycin d/c  - Continue Tamiflu daily, Zosyn  - F/U UA   - f/u BCx, UCx  - ID approval for tocilizumab x 1, hydroxychloroquine f/u recs    F: None  E: Replete K<4, Mg<2  N: jevity 1.5  G: protonix 40mg IV daily    Code: FULL CODE    Dispo: Patient requires continued monitoring in MICU

## 2020-03-21 LAB
ALBUMIN SERPL ELPH-MCNC: 2.1 G/DL — LOW (ref 3.3–5)
ALP SERPL-CCNC: 38 U/L — LOW (ref 40–120)
ALT FLD-CCNC: 39 U/L — SIGNIFICANT CHANGE UP (ref 10–45)
ANION GAP SERPL CALC-SCNC: 12 MMOL/L — SIGNIFICANT CHANGE UP (ref 5–17)
APTT BLD: 31.1 SEC — SIGNIFICANT CHANGE UP (ref 27.5–36.3)
AST SERPL-CCNC: 71 U/L — HIGH (ref 10–40)
BASE EXCESS BLDA CALC-SCNC: -0.3 MMOL/L — SIGNIFICANT CHANGE UP (ref -2–3)
BASE EXCESS BLDA CALC-SCNC: -1.2 MMOL/L — SIGNIFICANT CHANGE UP (ref -2–3)
BASOPHILS # BLD AUTO: 0 K/UL — SIGNIFICANT CHANGE UP (ref 0–0.2)
BASOPHILS NFR BLD AUTO: 0 % — SIGNIFICANT CHANGE UP (ref 0–2)
BILIRUB SERPL-MCNC: 0.3 MG/DL — SIGNIFICANT CHANGE UP (ref 0.2–1.2)
BUN SERPL-MCNC: 5 MG/DL — LOW (ref 7–23)
CALCIUM SERPL-MCNC: 7.4 MG/DL — LOW (ref 8.4–10.5)
CD3-/CD16+CD56+(%): 11 % — SIGNIFICANT CHANGE UP (ref 4–25)
CD3-CD16+CD56+(ABS): 133 CELLS/UL — SIGNIFICANT CHANGE UP (ref 70–760)
CHLORIDE SERPL-SCNC: 103 MMOL/L — SIGNIFICANT CHANGE UP (ref 96–108)
CK SERPL-CCNC: 250 U/L — HIGH (ref 25–170)
CO2 SERPL-SCNC: 21 MMOL/L — LOW (ref 22–31)
CREAT SERPL-MCNC: 0.56 MG/DL — SIGNIFICANT CHANGE UP (ref 0.5–1.3)
CRP SERPL-MCNC: 3.21 MG/DL — HIGH (ref 0–0.4)
D DIMER BLD IA.RAPID-MCNC: 978 NG/ML DDU — HIGH
EOSINOPHIL # BLD AUTO: 0.05 K/UL — SIGNIFICANT CHANGE UP (ref 0–0.5)
EOSINOPHIL NFR BLD AUTO: 1 % — SIGNIFICANT CHANGE UP (ref 0–6)
ERYTHROCYTE [SEDIMENTATION RATE] IN BLOOD: 27 MM/HR — HIGH
FERRITIN SERPL-MCNC: 660 NG/ML — HIGH (ref 15–150)
G6PD RBC-CCNC: 15.7 U/G HGB — SIGNIFICANT CHANGE UP (ref 7–20.5)
GAS PNL BLDA: SIGNIFICANT CHANGE UP
GLUCOSE BLDC GLUCOMTR-MCNC: 105 MG/DL — HIGH (ref 70–99)
GLUCOSE BLDC GLUCOMTR-MCNC: 108 MG/DL — HIGH (ref 70–99)
GLUCOSE BLDC GLUCOMTR-MCNC: 112 MG/DL — HIGH (ref 70–99)
GLUCOSE BLDC GLUCOMTR-MCNC: 117 MG/DL — HIGH (ref 70–99)
GLUCOSE BLDC GLUCOMTR-MCNC: 126 MG/DL — HIGH (ref 70–99)
GLUCOSE SERPL-MCNC: 92 MG/DL — SIGNIFICANT CHANGE UP (ref 70–99)
HCO3 BLDA-SCNC: 22 MMOL/L — SIGNIFICANT CHANGE UP (ref 21–28)
HCO3 BLDA-SCNC: 25 MMOL/L — SIGNIFICANT CHANGE UP (ref 21–28)
HCT VFR BLD CALC: 34 % — LOW (ref 34.5–45)
HGB BLD-MCNC: 11.4 G/DL — LOW (ref 11.5–15.5)
INR BLD: 0.97 — SIGNIFICANT CHANGE UP (ref 0.88–1.16)
LDH SERPL L TO P-CCNC: 420 U/L — HIGH (ref 50–242)
LYMPHOCYTES # BLD AUTO: 0.89 K/UL — LOW (ref 1–3.3)
LYMPHOCYTES # BLD AUTO: 18 % — SIGNIFICANT CHANGE UP (ref 13–44)
LYMPHOCYTES, ABSOLUTE: 1217 CELLS/UL — SIGNIFICANT CHANGE UP (ref 850–3900)
MAGNESIUM SERPL-MCNC: 2 MG/DL — SIGNIFICANT CHANGE UP (ref 1.6–2.6)
MCHC RBC-ENTMCNC: 31.1 PG — SIGNIFICANT CHANGE UP (ref 27–34)
MCHC RBC-ENTMCNC: 33.5 GM/DL — SIGNIFICANT CHANGE UP (ref 32–36)
MCV RBC AUTO: 92.9 FL — SIGNIFICANT CHANGE UP (ref 80–100)
MONOCYTES # BLD AUTO: 0.05 K/UL — SIGNIFICANT CHANGE UP (ref 0–0.9)
MONOCYTES NFR BLD AUTO: 1 % — LOW (ref 2–14)
NEUTROPHILS # BLD AUTO: 3.96 K/UL — SIGNIFICANT CHANGE UP (ref 1.8–7.4)
NEUTROPHILS NFR BLD AUTO: 77 % — SIGNIFICANT CHANGE UP (ref 43–77)
NRBC # BLD: SIGNIFICANT CHANGE UP /100 WBCS (ref 0–0)
PCO2 BLDA: 33 MMHG — SIGNIFICANT CHANGE UP (ref 32–45)
PCO2 BLDA: 43 MMHG — SIGNIFICANT CHANGE UP (ref 32–45)
PH BLDA: 7.38 — SIGNIFICANT CHANGE UP (ref 7.35–7.45)
PH BLDA: 7.44 — SIGNIFICANT CHANGE UP (ref 7.35–7.45)
PHOSPHATE SERPL-MCNC: 3.5 MG/DL — SIGNIFICANT CHANGE UP (ref 2.5–4.5)
PLATELET # BLD AUTO: 236 K/UL — SIGNIFICANT CHANGE UP (ref 150–400)
PO2 BLDA: 148 MMHG — HIGH (ref 83–108)
PO2 BLDA: 82 MMHG — LOW (ref 83–108)
POTASSIUM SERPL-MCNC: 4 MMOL/L — SIGNIFICANT CHANGE UP (ref 3.5–5.3)
POTASSIUM SERPL-SCNC: 4 MMOL/L — SIGNIFICANT CHANGE UP (ref 3.5–5.3)
PROCALCITONIN SERPL-MCNC: 4.1 NG/ML — HIGH (ref 0.02–0.1)
PROT SERPL-MCNC: 4.2 G/DL — LOW (ref 6–8.3)
PROTHROM AB SERPL-ACNC: 11 SEC — SIGNIFICANT CHANGE UP (ref 10–12.9)
RBC # BLD: 3.66 M/UL — LOW (ref 3.8–5.2)
RBC # FLD: 14.6 % — HIGH (ref 10.3–14.5)
SAO2 % BLDA: 96 % — SIGNIFICANT CHANGE UP (ref 95–100)
SAO2 % BLDA: 99 % — SIGNIFICANT CHANGE UP (ref 95–100)
SODIUM SERPL-SCNC: 136 MMOL/L — SIGNIFICANT CHANGE UP (ref 135–145)
TROPONIN T SERPL-MCNC: <0.01 NG/ML — SIGNIFICANT CHANGE UP (ref 0–0.01)
WBC # BLD: 4.95 K/UL — SIGNIFICANT CHANGE UP (ref 3.8–10.5)
WBC # FLD AUTO: 4.95 K/UL — SIGNIFICANT CHANGE UP (ref 3.8–10.5)

## 2020-03-21 PROCEDURE — 99291 CRITICAL CARE FIRST HOUR: CPT

## 2020-03-21 RX ORDER — DEXMEDETOMIDINE HYDROCHLORIDE IN 0.9% SODIUM CHLORIDE 4 UG/ML
0.2 INJECTION INTRAVENOUS
Qty: 200 | Refills: 0 | Status: DISCONTINUED | OUTPATIENT
Start: 2020-03-21 | End: 2020-03-22

## 2020-03-21 RX ORDER — DEXMEDETOMIDINE HYDROCHLORIDE IN 0.9% SODIUM CHLORIDE 4 UG/ML
0.2 INJECTION INTRAVENOUS
Qty: 200 | Refills: 0 | Status: DISCONTINUED | OUTPATIENT
Start: 2020-03-21 | End: 2020-03-21

## 2020-03-21 RX ADMIN — Medication 200 MILLIGRAM(S): at 01:17

## 2020-03-21 RX ADMIN — Medication 153 MILLIGRAM(S): at 21:01

## 2020-03-21 RX ADMIN — PIPERACILLIN AND TAZOBACTAM 100 GRAM(S): 4; .5 INJECTION, POWDER, LYOPHILIZED, FOR SOLUTION INTRAVENOUS at 21:01

## 2020-03-21 RX ADMIN — PROPOFOL 3.81 MICROGRAM(S)/KG/MIN: 10 INJECTION, EMULSION INTRAVENOUS at 23:08

## 2020-03-21 RX ADMIN — PROPOFOL 3.81 MICROGRAM(S)/KG/MIN: 10 INJECTION, EMULSION INTRAVENOUS at 02:00

## 2020-03-21 RX ADMIN — AZITHROMYCIN 255 MILLIGRAM(S): 500 TABLET, FILM COATED ORAL at 05:58

## 2020-03-21 RX ADMIN — Medication 153 MILLIGRAM(S): at 15:31

## 2020-03-21 RX ADMIN — CHLORHEXIDINE GLUCONATE 15 MILLILITER(S): 213 SOLUTION TOPICAL at 21:00

## 2020-03-21 RX ADMIN — PIPERACILLIN AND TAZOBACTAM 100 GRAM(S): 4; .5 INJECTION, POWDER, LYOPHILIZED, FOR SOLUTION INTRAVENOUS at 13:09

## 2020-03-21 RX ADMIN — Medication 75 MILLIGRAM(S): at 23:08

## 2020-03-21 RX ADMIN — FENTANYL CITRATE 3.18 MICROGRAM(S)/KG/HR: 50 INJECTION INTRAVENOUS at 18:48

## 2020-03-21 RX ADMIN — PANTOPRAZOLE SODIUM 40 MILLIGRAM(S): 20 TABLET, DELAYED RELEASE ORAL at 13:05

## 2020-03-21 RX ADMIN — MIDAZOLAM HYDROCHLORIDE 1.27 MG/KG/HR: 1 INJECTION, SOLUTION INTRAMUSCULAR; INTRAVENOUS at 12:58

## 2020-03-21 RX ADMIN — ENOXAPARIN SODIUM 40 MILLIGRAM(S): 100 INJECTION SUBCUTANEOUS at 21:00

## 2020-03-21 RX ADMIN — Medication 75 MILLIGRAM(S): at 12:57

## 2020-03-21 RX ADMIN — Medication 1 APPLICATION(S): at 21:02

## 2020-03-21 RX ADMIN — Medication 102 MILLIGRAM(S): at 18:44

## 2020-03-21 RX ADMIN — Medication 5.95 MICROGRAM(S)/KG/MIN: at 18:48

## 2020-03-21 RX ADMIN — PROPOFOL 3.81 MICROGRAM(S)/KG/MIN: 10 INJECTION, EMULSION INTRAVENOUS at 18:49

## 2020-03-21 RX ADMIN — Medication 153 MILLIGRAM(S): at 10:08

## 2020-03-21 RX ADMIN — PROPOFOL 3.81 MICROGRAM(S)/KG/MIN: 10 INJECTION, EMULSION INTRAVENOUS at 15:31

## 2020-03-21 RX ADMIN — PROPOFOL 3.81 MICROGRAM(S)/KG/MIN: 10 INJECTION, EMULSION INTRAVENOUS at 10:08

## 2020-03-21 RX ADMIN — Medication 153 MILLIGRAM(S): at 03:15

## 2020-03-21 RX ADMIN — CHLORHEXIDINE GLUCONATE 1 APPLICATION(S): 213 SOLUTION TOPICAL at 05:57

## 2020-03-21 RX ADMIN — Medication 200 MILLIGRAM(S): at 23:08

## 2020-03-21 RX ADMIN — Medication 102 MILLIGRAM(S): at 05:58

## 2020-03-21 RX ADMIN — Medication 1 APPLICATION(S): at 13:05

## 2020-03-21 RX ADMIN — CHLORHEXIDINE GLUCONATE 15 MILLILITER(S): 213 SOLUTION TOPICAL at 10:05

## 2020-03-21 RX ADMIN — Medication 200 MILLIGRAM(S): at 12:57

## 2020-03-21 RX ADMIN — PIPERACILLIN AND TAZOBACTAM 100 GRAM(S): 4; .5 INJECTION, POWDER, LYOPHILIZED, FOR SOLUTION INTRAVENOUS at 08:43

## 2020-03-21 RX ADMIN — PIPERACILLIN AND TAZOBACTAM 100 GRAM(S): 4; .5 INJECTION, POWDER, LYOPHILIZED, FOR SOLUTION INTRAVENOUS at 01:17

## 2020-03-21 RX ADMIN — Medication 1 APPLICATION(S): at 05:57

## 2020-03-21 RX ADMIN — DEXMEDETOMIDINE HYDROCHLORIDE IN 0.9% SODIUM CHLORIDE 3.18 MICROGRAM(S)/KG/HR: 4 INJECTION INTRAVENOUS at 20:02

## 2020-03-21 NOTE — PROGRESS NOTE ADULT - SUBJECTIVE AND OBJECTIVE BOX
*** INCOMPLETE ***  HOSPITAL COURSE:    OVERNIGHT EVENTS: pt w/ good urine output overnight, net negative >1L.    INTERVAL EVENTS: Ventilator settings adjusted, with continued improvement in P:F ratio after supination. Nimbex discontinued.    SUBJECTIVE HPI: Patient seen and examined at bedside.      VITAL SIGNS:  ICU Vital Signs Last 24 Hrs  T(C): 37.3 (21 Mar 2020 06:00), Max: 37.9 (21 Mar 2020 01:00)  T(F): 99.1 (21 Mar 2020 06:00), Max: 100.2 (21 Mar 2020 01:00)  HR: 70 (21 Mar 2020 14:00) (61 - 86)  BP: 145/87 (21 Mar 2020 12:15) (51/31 - 145/87)  BP(mean): 108 (21 Mar 2020 12:15) (37 - 108)  ABP: 132/61 (21 Mar 2020 14:00) (52/31 - 156/88)  ABP(mean): 82 (21 Mar 2020 14:00) (37 - 114)  RR: 25 (21 Mar 2020 14:00) (7 - 30)  SpO2: 98% (21 Mar 2020 14:00) (91% - 100%)      I&O's Summary    20 Mar 2020 07:01  -  21 Mar 2020 07:00  --------------------------------------------------------  IN: 1880.3 mL / OUT: 3085 mL / NET: -1204.7 mL    21 Mar 2020 07:01  -  21 Mar 2020 15:12  --------------------------------------------------------  IN: 123.8 mL / OUT: 650 mL / NET: -526.2 mL      Ventilator settings: 40%/350/25/10      PHYSICAL EXAM:  General: Comfortable, pleasant/anxious/agitated, Ill-appearing, well-nourished/frail/cachectic, comfortable / in distress  Neurological: AAOx3, no focal deficits  HEENT: NC/AT; EOMI, PERRL, clear conjunctiva, no nasal or oropharyngeal discharge or exudates, MMM  Neck: supple, no cervical or post-auricular lymphadenopathy  Cardiovascular: +S1/S2, no murmurs/rubs/gallops, RRR  Respiratory: CTA B/L, no diminished breath sounds, no wheezes/rales/rhonchi, no increased work of breathing or accessory muscle use  Gastrointestinal: soft, NT/ND; active BSx4 quadrants  Genitourinary: no suprapubic tenderness, no CVA tenderness  Extremities: WWP; no edema, clubbing or cyanosis  Vascular: 2+ radial, DP/PT pulses B/L  Skin: no rashes  Lines/Drains:       MEDICATIONS:  MEDICATIONS  (STANDING):  ascorbic acid IVPB 1500 milliGRAM(s) IV Intermittent every 6 hours  chlorhexidine 0.12% Liquid 15 milliLiter(s) Oral Mucosa every 12 hours  chlorhexidine 2% Cloths 1 Application(s) Topical <User Schedule>  cisatracurium Infusion 3 MICROgram(s)/kG/Min (11.4 mL/Hr) IV Continuous <Continuous>  dextrose 5%. 1000 milliLiter(s) (50 mL/Hr) IV Continuous <Continuous>  dextrose 50% Injectable 12.5 Gram(s) IV Push once  dextrose 50% Injectable 25 Gram(s) IV Push once  dextrose 50% Injectable 25 Gram(s) IV Push once  enoxaparin Injectable 40 milliGRAM(s) SubCutaneous every 24 hours  fentaNYL   Infusion. 0.5 MICROgram(s)/kG/Hr (3.18 mL/Hr) IV Continuous <Continuous>  hydroxychloroquine 200 milliGRAM(s) Oral every 12 hours  influenza   Vaccine 0.5 milliLiter(s) IntraMuscular once  insulin lispro (HumaLOG) corrective regimen sliding scale   SubCutaneous every 6 hours  midazolam Infusion 0.02 mG/kG/Hr (1.27 mL/Hr) IV Continuous <Continuous>  norepinephrine Infusion 0.05 MICROgram(s)/kG/Min (5.95 mL/Hr) IV Continuous <Continuous>  oseltamivir Suspension 75 milliGRAM(s) Enteral Tube every 12 hours  pantoprazole   Suspension 40 milliGRAM(s) Enteral Tube daily  petrolatum Ophthalmic Ointment 1 Application(s) Both EYES three times a day  piperacillin/tazobactam IVPB.. 3.375 Gram(s) IV Intermittent every 6 hours  propofol Infusion 10 MICROgram(s)/kG/Min (3.81 mL/Hr) IV Continuous <Continuous>  thiamine IVPB 200 milliGRAM(s) IV Intermittent <User Schedule>    MEDICATIONS  (PRN):  dextrose 40% Gel 15 Gram(s) Oral once PRN Blood Glucose LESS THAN 70 milliGRAM(s)/deciliter  glucagon  Injectable 1 milliGRAM(s) IntraMuscular once PRN Glucose LESS THAN 70 milligrams/deciliter      ALLERGIES/INTOLERANCES:  Allergies    Ceclor (Unknown)    Intolerances          LABS:                        11.4   4.95  )-----------( 236      ( 21 Mar 2020 06:40 )             34.0     Auto Neutrophil %: 77.0 % (03-21-20 @ 06:40)  Auto Lymphocyte #: 0.89 K/uL (03-21-20 @ 06:40)      03-21    136  |  103  |  5<L>  ----------------------------<  92  4.0   |  21<L>  |  0.56    Ca    7.4<L>      21 Mar 2020 06:40  Phos  3.5     03-21  Mg     2.0     03-21    TPro  4.2<L>  /  Alb  2.1<L>  /  TBili  0.3  /  DBili  x   /  AST  71<H>  /  ALT  39  /  AlkPhos  38<L>  03-21    CAPILLARY BLOOD GLUCOSE      POCT Blood Glucose.: 126 mg/dL (21 Mar 2020 10:44)      PT/INR - ( 21 Mar 2020 06:40 )   PT: 11.0 sec;   INR: 0.97          PTT - ( 21 Mar 2020 06:40 )  PTT:31.1 sec      CARDIAC MARKERS ( 21 Mar 2020 06:40 )  x     / <0.01 ng/mL / 250 U/L / x     / x      CARDIAC MARKERS ( 20 Mar 2020 04:14 )  x     / x     / 77 U/L / x     / x                ABG - ( 21 Mar 2020 14:10 )  pH, Arterial: 7.37  pH, Blood: x     /  pCO2: 42    /  pO2: 65    / HCO3: 24    / Base Excess: -1.1  /  SaO2: 92                  CoVID-specific labs:  Procalcitonin, Serum: 4.10 ng/mL <H> (03-21-20 @ 06:57)  C-Reactive Protein, Serum: 3.21 mg/dL <H> (03-21-20 @ 06:40)      CoVID q3d labs:  Sedimentation Rate, Erythrocyte: 27 mm/Hr (03-21-20 @ 06:40)  Lactate Dehydrogenase, Serum: 420 U/L (03-21-20 @ 06:40)  Creatine Kinase, Serum: 250 U/L (03-21-20 @ 06:40)      Microbiology:    Culture - Blood (collected 18 Mar 2020 17:11)  Source: .Blood Blood  Preliminary Report (20 Mar 2020 18:00):    No growth at 2 days.    Culture - Blood (collected 18 Mar 2020 17:11)  Source: .Blood Blood  Preliminary Report (20 Mar 2020 18:00):    No growth at 2 days.          RADIOLOGY, EKG AND ADDITIONAL TESTS: Reviewed.  CoVID Basline labs:  T Cell Subsets:  ABS CD3: 725 /uL (03-19-20 @ 16:51)  ABS CD4: 524 /uL (03-19-20 @ 16:51)  ABS CD8: 191 /uL (03-19-20 @ 16:51) OVERNIGHT EVENTS: pt w/ good urine output overnight, net negative >1L.    INTERVAL EVENTS: Ventilator settings adjusted, with continued improvement in P:F ratio after supination. Nimbex discontinued.    SUBJECTIVE HPI: Patient seen and examined at bedside.      VITAL SIGNS:  ICU Vital Signs Last 24 Hrs  T(C): 37.3 (21 Mar 2020 06:00), Max: 37.9 (21 Mar 2020 01:00)  T(F): 99.1 (21 Mar 2020 06:00), Max: 100.2 (21 Mar 2020 01:00)  HR: 70 (21 Mar 2020 14:00) (61 - 86)  BP: 145/87 (21 Mar 2020 12:15) (51/31 - 145/87)  BP(mean): 108 (21 Mar 2020 12:15) (37 - 108)  ABP: 132/61 (21 Mar 2020 14:00) (52/31 - 156/88)  ABP(mean): 82 (21 Mar 2020 14:00) (37 - 114)  RR: 25 (21 Mar 2020 14:00) (7 - 30)  SpO2: 98% (21 Mar 2020 14:00) (91% - 100%)      I&O's Summary    20 Mar 2020 07:01  -  21 Mar 2020 07:00  --------------------------------------------------------  IN: 1880.3 mL / OUT: 3085 mL / NET: -1204.7 mL    21 Mar 2020 07:01  -  21 Mar 2020 15:12  --------------------------------------------------------  IN: 123.8 mL / OUT: 650 mL / NET: -526.2 mL      Ventilator settings: 40%/350/25/10      PHYSICAL EXAM: per fellow/attending  General: sedated on ventilator  Neurological: sedated  HEENT: intubated  Respiratory: on ventilator, diminished breath sounds at bases b/l  Gastrointestinal: soft, NT/ND  Extremities: WWP; trace dependent edema      MEDICATIONS:  MEDICATIONS  (STANDING):  ascorbic acid IVPB 1500 milliGRAM(s) IV Intermittent every 6 hours  chlorhexidine 0.12% Liquid 15 milliLiter(s) Oral Mucosa every 12 hours  chlorhexidine 2% Cloths 1 Application(s) Topical <User Schedule>  cisatracurium Infusion 3 MICROgram(s)/kG/Min (11.4 mL/Hr) IV Continuous <Continuous>  dextrose 5%. 1000 milliLiter(s) (50 mL/Hr) IV Continuous <Continuous>  dextrose 50% Injectable 12.5 Gram(s) IV Push once  dextrose 50% Injectable 25 Gram(s) IV Push once  dextrose 50% Injectable 25 Gram(s) IV Push once  enoxaparin Injectable 40 milliGRAM(s) SubCutaneous every 24 hours  fentaNYL   Infusion. 0.5 MICROgram(s)/kG/Hr (3.18 mL/Hr) IV Continuous <Continuous>  hydroxychloroquine 200 milliGRAM(s) Oral every 12 hours  influenza   Vaccine 0.5 milliLiter(s) IntraMuscular once  insulin lispro (HumaLOG) corrective regimen sliding scale   SubCutaneous every 6 hours  midazolam Infusion 0.02 mG/kG/Hr (1.27 mL/Hr) IV Continuous <Continuous>  norepinephrine Infusion 0.05 MICROgram(s)/kG/Min (5.95 mL/Hr) IV Continuous <Continuous>  oseltamivir Suspension 75 milliGRAM(s) Enteral Tube every 12 hours  pantoprazole   Suspension 40 milliGRAM(s) Enteral Tube daily  petrolatum Ophthalmic Ointment 1 Application(s) Both EYES three times a day  piperacillin/tazobactam IVPB.. 3.375 Gram(s) IV Intermittent every 6 hours  propofol Infusion 10 MICROgram(s)/kG/Min (3.81 mL/Hr) IV Continuous <Continuous>  thiamine IVPB 200 milliGRAM(s) IV Intermittent <User Schedule>    MEDICATIONS  (PRN):  dextrose 40% Gel 15 Gram(s) Oral once PRN Blood Glucose LESS THAN 70 milliGRAM(s)/deciliter  glucagon  Injectable 1 milliGRAM(s) IntraMuscular once PRN Glucose LESS THAN 70 milligrams/deciliter      ALLERGIES/INTOLERANCES:  Allergies    Ceclor (Unknown)    Intolerances      LABS:                        11.4   4.95  )-----------( 236      ( 21 Mar 2020 06:40 )             34.0     Auto Neutrophil %: 77.0 % (03-21-20 @ 06:40)  Auto Lymphocyte #: 0.89 K/uL (03-21-20 @ 06:40)      03-21    136  |  103  |  5<L>  ----------------------------<  92  4.0   |  21<L>  |  0.56    Ca    7.4<L>      21 Mar 2020 06:40  Phos  3.5     03-21  Mg     2.0     03-21    TPro  4.2<L>  /  Alb  2.1<L>  /  TBili  0.3  /  DBili  x   /  AST  71<H>  /  ALT  39  /  AlkPhos  38<L>  03-21    CAPILLARY BLOOD GLUCOSE      POCT Blood Glucose.: 126 mg/dL (21 Mar 2020 10:44)      PT/INR - ( 21 Mar 2020 06:40 )   PT: 11.0 sec;   INR: 0.97          PTT - ( 21 Mar 2020 06:40 )  PTT:31.1 sec      CARDIAC MARKERS ( 21 Mar 2020 06:40 )  x     / <0.01 ng/mL / 250 U/L / x     / x      CARDIAC MARKERS ( 20 Mar 2020 04:14 )  x     / x     / 77 U/L / x     / x          ABG - ( 21 Mar 2020 14:10 )  pH, Arterial: 7.37  pH, Blood: x     /  pCO2: 42    /  pO2: 65    / HCO3: 24    / Base Excess: -1.1  /  SaO2: 92          CoVID-specific labs:  Procalcitonin, Serum: 4.10 ng/mL <H> (03-21-20 @ 06:57)  C-Reactive Protein, Serum: 3.21 mg/dL <H> (03-21-20 @ 06:40)      CoVID q3d labs:  Sedimentation Rate, Erythrocyte: 27 mm/Hr (03-21-20 @ 06:40)  Lactate Dehydrogenase, Serum: 420 U/L (03-21-20 @ 06:40)  Creatine Kinase, Serum: 250 U/L (03-21-20 @ 06:40)      Microbiology:  Culture - Blood (collected 18 Mar 2020 17:11)  Source: .Blood Blood  Preliminary Report (20 Mar 2020 18:00):    No growth at 2 days.    Culture - Blood (collected 18 Mar 2020 17:11)  Source: .Blood Blood  Preliminary Report (20 Mar 2020 18:00):    No growth at 2 days.      RADIOLOGY, EKG AND ADDITIONAL TESTS: Reviewed.  CoVID Basline labs:  T Cell Subsets:  ABS CD3: 725 /uL (03-19-20 @ 16:51)  ABS CD4: 524 /uL (03-19-20 @ 16:51)  ABS CD8: 191 /uL (03-19-20 @ 16:51)

## 2020-03-21 NOTE — PROGRESS NOTE ADULT - ASSESSMENT
Ms. Gudino is a 29 year old female with a past medical history of recurrent pneumonia who presents with fever, cough, and shortness of breath. She was admitted to the MICU for acute hypoxic respiratory distress 2/2 influenza A, CoVID-19, and possible superinfection with post-viral PNA.    PULMONARY:  #Acute Hypoxic Respiratory Failure 2/2 ARDS 2/2 COVID  On arrival, CXR w/ bilateral patchy opacities on admission concerning for COVID, found to be tachypneic and hypoxic with increased work of breathing  requiring intubation 3/19. VBG on arrival: pH 7.29, pCO2 37, pO2 19, HCO3 18. S/p intubation 3/19, w/ repeat ABG pH 7.28, pCO2 38, pO2 98, HCO3 19. Pt w/ known influenza A and COVID-19 w/ possible superinfection with post-viral PNA. Respiratory failure 2/2 ARDS in setting of current infection, worsening CXR, and no history of cardiac dysfunction.  - Ventilator settings: 40%/350/25/10  - 3/21 transitioned from prone to supine, with follow-up ABG: pH 7.37, pCO2 42, pO2 65, HCO3 24, O2sat 92%.   - P:F ratio 370, indicating improvement in ARDS  - Will continue to adjust ventilator as needed and monitor ABGs.    #ARDS 2/2 COVID  Pt w/ known influenza A and COVID-19 w/ possible superinfection with post-viral PNA. CXR w/ bilateral patchy opacities on admission concerning for COVID, with worsening CXR. Respiratory failure likely 2/2 ARDS in setting of current infection and no history of cardiac dysfunction.  - 3/21 transitioned from prone to supine, with follow-up ABG: pH 7.37, pCO2 42, pO2 65, HCO3 24, O2sat 92%.   - P:F ratio 370, indicating improvement in ARDS  - plan as above      ID:  #COVID-19  CoVID-19 confirmed by PCR.   - s/p tociluzumab 560mg IV x 1 dose (ID approved).  - s/p hydroxychloroquine 400mg q12h x2 doses --> 200mg q12h (3/18- to finish 3/24)  - No Kaletra as no longer available  - HIV negative  Plan:    -COVID Isolation precautions    -c/w vitamin c and thiamine supplementation    -continue to monitor daily CBC w/ differential, CMP, Mg, Phos, CRP, procalcitonin, and ABG    -q3d labs: ESR, Tcell subset, d-dimer, LDH, ferritin, CK, trop, coags    #Influenza A  Known +Influenza A prior to admission, was on oseltamivir at home. Repeat RVP negative for influenza during admission. Continued oseltamivir 75mg q12hrs, started 3/19. Will end course 3/21.    #Sepsis 2/2 COVID+ vs. Influenxa A vs. Lobar PNA  2/4 SIRS criteria on admission (bands 33.9% w/o leukocytosis, , RR20). Febrile 100.6. CXR w/ bilateral patchy opacities. Etiology of sepsis likely multifactorial, confirmed influenza A and CoVID-19 w/ ?superimposed post-viral pneumonia.  - MRSA swab negative  - blood clx collected on admission (3/18) w/ NGTD  - For influenza: oseltamivir 75mg q12hrs (3/19-21)  - For CoVID-19: plan as above  - For ?post-viral pneumonia: c/w zosyn 3.375mg q6hrs for 7d course (3/19- to finish 3/26)       -s/p levaquin 750mg x1 3/18 (in ED), azithromycin 500mg QD x3d (3/19-3/21)      CARDIOVASCULAR:  #Hypotension  - Currently maintaining MAP >65  - Pt requiring low dose levophed in setting of sedation medications.      NEUROLOGY:  #Sedation  -Sedated and intubated  -current drips: fentanyl gtt, propofol gtt, versed gtt  -continue to monitor triglycerides while on propofol  -nimbex discontinued      GASTROINTESTINAL:  #Prophylaxis  - Protonix 40mg IV daily while intubated    GENITOURINARY:  #Decreased urine output  - jackman placed after intubation  - Urine output improving, s/p lasix 20mg IVP x2 on 3/20.  - continue to monitor strict I&O, goal UOP>30cc/hr and net (-)500-1000cc  - F/U daily Cr on BMP      F: None  E: Replete K<4, Mg<2  N: jevity 1.5  G: protonix 40mg IV daily    Code: FULL CODE    Dispo: Patient requires continued monitoring in MICU Ms. Gudino is a 29 year old female with a past medical history of recurrent pneumonia who presents with fever, cough, and shortness of breath. She was admitted to the MICU for acute hypoxic respiratory distress 2/2 influenza A, CoVID-19, and possible superinfection with post-viral PNA.    PULMONARY:  #Acute Hypoxic Respiratory Failure 2/2 ARDS 2/2 COVID  On arrival, CXR w/ bilateral patchy opacities on admission concerning for COVID, found to be tachypneic and hypoxic with increased work of breathing  requiring intubation 3/19. VBG on arrival: pH 7.29, pCO2 37, pO2 19, HCO3 18. S/p intubation 3/19, w/ repeat ABG pH 7.28, pCO2 38, pO2 98, HCO3 19. Pt w/ known influenza A and COVID-19 w/ possible superinfection with post-viral PNA. Respiratory failure 2/2 ARDS in setting of current infection, worsening CXR, and no history of cardiac dysfunction.  - Ventilator settings: 40%/350/25/10  - 3/21 transitioned from prone to supine, with follow-up ABG: pH 7.37, pCO2 42, pO2 65, HCO3 24, O2sat 92%.   - P:F ratio 370, indicating improvement in ARDS  - Will continue to adjust ventilator as needed and monitor ABGs.    #ARDS 2/2 COVID  Pt w/ known influenza A and COVID-19 w/ possible superinfection with post-viral PNA. CXR w/ bilateral patchy opacities on admission concerning for COVID, with worsening CXR. Respiratory failure likely 2/2 ARDS in setting of current infection and no history of cardiac dysfunction.  - 3/21 transitioned from prone to supine, with follow-up ABG: pH 7.37, pCO2 42, pO2 65, HCO3 24, O2sat 92%.   - P:F ratio 370, indicating improvement in ARDS  - plan as above      ID:  #COVID-19  CoVID-19 confirmed by PCR.   - s/p tociluzumab 560mg IV x 1 dose (ID approved).  - s/p hydroxychloroquine 400mg q12h x2 doses --> 200mg q12h (3/18- to finish 3/24)  - No Kaletra as no longer available  - HIV negative  Plan:    -COVID Isolation precautions    -c/w vitamin c and thiamine supplementation    -continue to monitor daily CBC w/ differential, CMP, Mg, Phos, CRP, procalcitonin, and ABG    -q3d labs: ESR, Tcell subset, d-dimer, LDH, ferritin, CK, trop, coags    #Influenza A  Known +Influenza A prior to admission, was on oseltamivir at home. Repeat RVP negative for influenza during admission. Continued oseltamivir 75mg q12hrs, started 3/19. Will end course 3/21.    #Sepsis 2/2 COVID+ vs. Influenxa A vs. Lobar PNA  2/4 SIRS criteria on admission (bands 33.9% w/o leukocytosis, , RR20). Febrile 100.6. CXR w/ bilateral patchy opacities. Etiology of sepsis likely multifactorial, confirmed influenza A and CoVID-19 w/ ?superimposed post-viral pneumonia.  - MRSA swab negative  - blood clx collected on admission (3/18) w/ NGTD  - For influenza: oseltamivir 75mg q12hrs (3/19-21)  - For CoVID-19: plan as above  - For ?post-viral pneumonia: c/w zosyn 3.375mg q6hrs for 7d course (3/19- to finish 3/26)       -s/p levaquin 750mg x1 3/18 (in ED), azithromycin 500mg QD x3d (3/19-3/21)      CARDIOVASCULAR:  #Hypotension  - Currently maintaining MAP >65  - Pt requiring low dose levophed in setting of sedation medications.      NEUROLOGY:  #Sedation  -Sedated and intubated  -current drips: fentanyl gtt, propofol gtt, versed gtt  -continue to monitor triglycerides while on propofol  -nimbex discontinued  -RAAS goal -1      GASTROINTESTINAL:  #Prophylaxis  - Protonix 40mg IV daily while intubated    GENITOURINARY:  #Decreased urine output  - jackman placed after intubation  - Urine output improving, s/p lasix 20mg IVP x2 on 3/20.  - continue to monitor strict I&O, goal UOP>30cc/hr and net (-)500-1000cc  - F/U daily Cr on BMP      F: None  E: Replete K<4, Mg<2  N: jevity 1.5  G: protonix 40mg IV daily    Code: FULL CODE    Dispo: Patient requires continued monitoring in MICU

## 2020-03-22 LAB
A-TUMOR NECROSIS FACT SERPL-MCNC: <5 PG/ML — SIGNIFICANT CHANGE UP
ALBUMIN SERPL ELPH-MCNC: 1.9 G/DL — LOW (ref 3.3–5)
ALP SERPL-CCNC: 40 U/L — SIGNIFICANT CHANGE UP (ref 40–120)
ALT FLD-CCNC: 37 U/L — SIGNIFICANT CHANGE UP (ref 10–45)
AMYLASE P1 CFR SERPL: 72 U/L — SIGNIFICANT CHANGE UP (ref 25–125)
ANION GAP SERPL CALC-SCNC: 9 MMOL/L — SIGNIFICANT CHANGE UP (ref 5–17)
AST SERPL-CCNC: 59 U/L — HIGH (ref 10–40)
BASE EXCESS BLDA CALC-SCNC: 1.9 MMOL/L — SIGNIFICANT CHANGE UP (ref -2–3)
BILIRUB SERPL-MCNC: 0.3 MG/DL — SIGNIFICANT CHANGE UP (ref 0.2–1.2)
BUN SERPL-MCNC: 2 MG/DL — LOW (ref 7–23)
CALCIUM SERPL-MCNC: 7.5 MG/DL — LOW (ref 8.4–10.5)
CHLORIDE SERPL-SCNC: 105 MMOL/L — SIGNIFICANT CHANGE UP (ref 96–108)
CO2 SERPL-SCNC: 26 MMOL/L — SIGNIFICANT CHANGE UP (ref 22–31)
CREAT SERPL-MCNC: 0.48 MG/DL — LOW (ref 0.5–1.3)
CRP SERPL-MCNC: 1.63 MG/DL — HIGH (ref 0–0.4)
FERRITIN SERPL-MCNC: 551 NG/ML — HIGH (ref 15–150)
GLUCOSE BLDC GLUCOMTR-MCNC: 136 MG/DL — HIGH (ref 70–99)
GLUCOSE BLDC GLUCOMTR-MCNC: 138 MG/DL — HIGH (ref 70–99)
GLUCOSE BLDC GLUCOMTR-MCNC: 99 MG/DL — SIGNIFICANT CHANGE UP (ref 70–99)
GLUCOSE SERPL-MCNC: 102 MG/DL — HIGH (ref 70–99)
HCO3 BLDA-SCNC: 26 MMOL/L — SIGNIFICANT CHANGE UP (ref 21–28)
HCT VFR BLD CALC: 31.3 % — LOW (ref 34.5–45)
HGB BLD-MCNC: 10.7 G/DL — LOW (ref 11.5–15.5)
IL10 SERPL-MCNC: 25 PG/ML — HIGH
IL12 SERPL-MCNC: <5 PG/ML — SIGNIFICANT CHANGE UP
IL13 SERPL-MCNC: <5 PG/ML — SIGNIFICANT CHANGE UP
IL2 SERPL-MCNC: 2447 PG/ML — HIGH
IL2 SERPL-MCNC: <5 PG/ML — SIGNIFICANT CHANGE UP
IL4 SERPL-MCNC: <5 PG/ML — SIGNIFICANT CHANGE UP
IL6 SERPL-MCNC: 93 PG/ML — HIGH
IL8 SERPL-MCNC: <5 PG/ML — SIGNIFICANT CHANGE UP
INTERFERON GAMMA: 7 PG/ML — HIGH
INTERLEUKIN 1 BETA: <5 PG/ML — SIGNIFICANT CHANGE UP
INTERLEUKIN 17: <5 PG/ML — SIGNIFICANT CHANGE UP
INTERLEUKIN 5: <5 PG/ML — SIGNIFICANT CHANGE UP
MCHC RBC-ENTMCNC: 31.5 PG — SIGNIFICANT CHANGE UP (ref 27–34)
MCHC RBC-ENTMCNC: 34.2 GM/DL — SIGNIFICANT CHANGE UP (ref 32–36)
MCV RBC AUTO: 92.1 FL — SIGNIFICANT CHANGE UP (ref 80–100)
NRBC # BLD: 0 /100 WBCS — SIGNIFICANT CHANGE UP (ref 0–0)
PCO2 BLDA: 38 MMHG — SIGNIFICANT CHANGE UP (ref 32–45)
PH BLDA: 7.45 — SIGNIFICANT CHANGE UP (ref 7.35–7.45)
PLATELET # BLD AUTO: 195 K/UL — SIGNIFICANT CHANGE UP (ref 150–400)
PO2 BLDA: 76 MMHG — LOW (ref 83–108)
POTASSIUM SERPL-MCNC: 3.1 MMOL/L — LOW (ref 3.5–5.3)
POTASSIUM SERPL-SCNC: 3.1 MMOL/L — LOW (ref 3.5–5.3)
PROCALCITONIN SERPL-MCNC: 2.34 NG/ML — HIGH (ref 0.02–0.1)
PROT SERPL-MCNC: 4.1 G/DL — LOW (ref 6–8.3)
RBC # BLD: 3.4 M/UL — LOW (ref 3.8–5.2)
RBC # FLD: 14.2 % — SIGNIFICANT CHANGE UP (ref 10.3–14.5)
SAO2 % BLDA: 95 % — SIGNIFICANT CHANGE UP (ref 95–100)
SODIUM SERPL-SCNC: 140 MMOL/L — SIGNIFICANT CHANGE UP (ref 135–145)
TRIGL SERPL-MCNC: 1393 MG/DL — HIGH (ref 10–149)
WBC # BLD: 4.21 K/UL — SIGNIFICANT CHANGE UP (ref 3.8–10.5)
WBC # FLD AUTO: 4.21 K/UL — SIGNIFICANT CHANGE UP (ref 3.8–10.5)

## 2020-03-22 PROCEDURE — 99291 CRITICAL CARE FIRST HOUR: CPT

## 2020-03-22 PROCEDURE — 36620 INSERTION CATHETER ARTERY: CPT | Mod: GC

## 2020-03-22 RX ORDER — POTASSIUM CHLORIDE 20 MEQ
20 PACKET (EA) ORAL
Refills: 0 | Status: COMPLETED | OUTPATIENT
Start: 2020-03-22 | End: 2020-03-22

## 2020-03-22 RX ORDER — MIDAZOLAM HYDROCHLORIDE 1 MG/ML
4 INJECTION, SOLUTION INTRAMUSCULAR; INTRAVENOUS ONCE
Refills: 0 | Status: DISCONTINUED | OUTPATIENT
Start: 2020-03-22 | End: 2020-03-22

## 2020-03-22 RX ORDER — HYDROMORPHONE HYDROCHLORIDE 2 MG/ML
1 INJECTION INTRAMUSCULAR; INTRAVENOUS; SUBCUTANEOUS
Refills: 0 | Status: DISCONTINUED | OUTPATIENT
Start: 2020-03-23 | End: 2020-03-24

## 2020-03-22 RX ORDER — HYDROMORPHONE HYDROCHLORIDE 2 MG/ML
2 INJECTION INTRAMUSCULAR; INTRAVENOUS; SUBCUTANEOUS
Qty: 10 | Refills: 0 | Status: DISCONTINUED | OUTPATIENT
Start: 2020-03-23 | End: 2020-03-23

## 2020-03-22 RX ORDER — FENTANYL CITRATE 50 UG/ML
50 INJECTION INTRAVENOUS ONCE
Refills: 0 | Status: DISCONTINUED | OUTPATIENT
Start: 2020-03-22 | End: 2020-03-22

## 2020-03-22 RX ADMIN — Medication 1 APPLICATION(S): at 22:01

## 2020-03-22 RX ADMIN — PIPERACILLIN AND TAZOBACTAM 100 GRAM(S): 4; .5 INJECTION, POWDER, LYOPHILIZED, FOR SOLUTION INTRAVENOUS at 14:33

## 2020-03-22 RX ADMIN — ENOXAPARIN SODIUM 40 MILLIGRAM(S): 100 INJECTION SUBCUTANEOUS at 22:00

## 2020-03-22 RX ADMIN — MIDAZOLAM HYDROCHLORIDE 1.27 MG/KG/HR: 1 INJECTION, SOLUTION INTRAMUSCULAR; INTRAVENOUS at 13:24

## 2020-03-22 RX ADMIN — Medication 50 MILLIEQUIVALENT(S): at 08:30

## 2020-03-22 RX ADMIN — Medication 102 MILLIGRAM(S): at 05:25

## 2020-03-22 RX ADMIN — FENTANYL CITRATE 50 MICROGRAM(S): 50 INJECTION INTRAVENOUS at 13:40

## 2020-03-22 RX ADMIN — PIPERACILLIN AND TAZOBACTAM 100 GRAM(S): 4; .5 INJECTION, POWDER, LYOPHILIZED, FOR SOLUTION INTRAVENOUS at 22:00

## 2020-03-22 RX ADMIN — Medication 1 APPLICATION(S): at 13:45

## 2020-03-22 RX ADMIN — PIPERACILLIN AND TAZOBACTAM 100 GRAM(S): 4; .5 INJECTION, POWDER, LYOPHILIZED, FOR SOLUTION INTRAVENOUS at 01:29

## 2020-03-22 RX ADMIN — Medication 153 MILLIGRAM(S): at 22:00

## 2020-03-22 RX ADMIN — PANTOPRAZOLE SODIUM 40 MILLIGRAM(S): 20 TABLET, DELAYED RELEASE ORAL at 11:59

## 2020-03-22 RX ADMIN — Medication 75 MILLIGRAM(S): at 11:59

## 2020-03-22 RX ADMIN — FENTANYL CITRATE 50 MICROGRAM(S): 50 INJECTION INTRAVENOUS at 13:45

## 2020-03-22 RX ADMIN — Medication 1 APPLICATION(S): at 05:26

## 2020-03-22 RX ADMIN — PROPOFOL 3.81 MICROGRAM(S)/KG/MIN: 10 INJECTION, EMULSION INTRAVENOUS at 21:13

## 2020-03-22 RX ADMIN — Medication 50 MILLIEQUIVALENT(S): at 11:59

## 2020-03-22 RX ADMIN — Medication 153 MILLIGRAM(S): at 11:59

## 2020-03-22 RX ADMIN — MIDAZOLAM HYDROCHLORIDE 4 MILLIGRAM(S): 1 INJECTION, SOLUTION INTRAMUSCULAR; INTRAVENOUS at 14:09

## 2020-03-22 RX ADMIN — PIPERACILLIN AND TAZOBACTAM 100 GRAM(S): 4; .5 INJECTION, POWDER, LYOPHILIZED, FOR SOLUTION INTRAVENOUS at 07:12

## 2020-03-22 RX ADMIN — Medication 153 MILLIGRAM(S): at 17:13

## 2020-03-22 RX ADMIN — Medication 200 MILLIGRAM(S): at 14:34

## 2020-03-22 RX ADMIN — CHLORHEXIDINE GLUCONATE 15 MILLILITER(S): 213 SOLUTION TOPICAL at 22:00

## 2020-03-22 RX ADMIN — CHLORHEXIDINE GLUCONATE 15 MILLILITER(S): 213 SOLUTION TOPICAL at 10:16

## 2020-03-22 RX ADMIN — PROPOFOL 3.81 MICROGRAM(S)/KG/MIN: 10 INJECTION, EMULSION INTRAVENOUS at 04:38

## 2020-03-22 RX ADMIN — Medication 153 MILLIGRAM(S): at 04:38

## 2020-03-22 RX ADMIN — Medication 102 MILLIGRAM(S): at 17:13

## 2020-03-22 RX ADMIN — CHLORHEXIDINE GLUCONATE 1 APPLICATION(S): 213 SOLUTION TOPICAL at 05:26

## 2020-03-22 RX ADMIN — FENTANYL CITRATE 3.18 MICROGRAM(S)/KG/HR: 50 INJECTION INTRAVENOUS at 04:38

## 2020-03-22 RX ADMIN — Medication 50 MILLIEQUIVALENT(S): at 10:14

## 2020-03-22 NOTE — PROGRESS NOTE ADULT - ASSESSMENT
Ms. Gudino is a 29 year old female with a past medical history of recurrent pneumonia who presents with fever, cough, and shortness of breath. She was admitted to the MICU for acute hypoxic respiratory distress 2/2 influenza A, CoVID-19, and possible superinfection with post-viral PNA.    PULMONARY:  #Acute Hypoxic Respiratory Failure 2/2 ARDS 2/2 COVID  On arrival, CXR w/ bilateral patchy opacities on admission concerning for COVID, found to be tachypneic and hypoxic with increased work of breathing  requiring intubation 3/19. VBG on arrival: pH 7.29, pCO2 37, pO2 19, HCO3 18. S/p intubation 3/19, w/ repeat ABG pH 7.28, pCO2 38, pO2 98, HCO3 19. Pt w/ known influenza A and COVID-19 w/ possible superinfection with post-viral PNA. Respiratory failure 2/2 ARDS in setting of current infection, worsening CXR, and no history of cardiac dysfunction.  - 3/21 pt supinated per ARDS protocol with P/F 370  - 3/22: Ventilator settings: 40%/350/25/10, AM ABG pH 7.45, pCO2 38, pO2 76, HCO3 26, O2sat 95% --> P/F 190  - Will continue to adjust ventilator as needed and monitor ABGs.    #ARDS 2/2 COVID  Pt w/ known influenza A and COVID-19 w/ possible superinfection with post-viral PNA. CXR w/ bilateral patchy opacities on admission concerning for COVID, with worsening CXR. Respiratory failure likely 2/2 ARDS in setting of current infection and no history of cardiac dysfunction.  - plan as above      ID:  #COVID-19  CoVID-19 confirmed by PCR.   - s/p tociluzumab 560mg IV x 1 dose (ID approved).  - s/p hydroxychloroquine 400mg q12h x2 doses --> 200mg q12h (3/18- to finish 3/24)  - No Kaletra as no longer available  - HIV negative  - 3/22 markers of inflammation downtrending (procalcitonin, ferritin, CRP)  Plan:    -COVID Isolation precautions    -c/w vitamin c and thiamine supplementation    -continue to monitor daily CBC w/ differential, CMP, Mg, Phos, CRP, procalcitonin, and ABG    -q3d labs: ESR, Tcell subset, d-dimer, LDH, ferritin, CK, trop, coags    #Influenza A  Known +Influenza A prior to admission, was on oseltamivir at home. Repeat RVP negative for influenza during admission. Continued oseltamivir 75mg q12hrs, started 3/19. Will continue tamiflu in setting of critical illness.    #Sepsis 2/2 COVID+ vs. Influenxa A vs. Lobar PNA  2/4 SIRS criteria on admission (bands 33.9% w/o leukocytosis, , RR20). Febrile 100.6. CXR w/ bilateral patchy opacities. Etiology of sepsis likely multifactorial, confirmed influenza A and CoVID-19 w/ ?superimposed post-viral pneumonia.  - MRSA swab negative  - blood clx collected on admission (3/18) w/ NGTD  - For influenza: oseltamivir 75mg q12hrs (3/19-)  - For CoVID-19: plan as above  - For ?post-viral pneumonia: c/w zosyn 3.375mg q6hrs for 7d course (3/19- to finish 3/26)       -s/p levaquin 750mg x1 3/18 (in ED), azithromycin 500mg QD x3d (3/19-3/21)      NEUROLOGY:  #Sedation  -Sedated and intubated  -current drips: fentanyl gtt, propofol gtt, versed gtt  -triglycerides elevated 1393 on 3/22, likely 2/2 propofol. versed increased to maintain sedation goals in order to decrease propofol requirements.  -continue to monitor triglycerides while on propofol  -nimbex discontinued  -RAAS goal -1      CARDIOVASCULAR:  #Hypotension  - Pt requiring low dose levophed in setting of sedation medications.  - Currently maintaining MAP >65      GASTROINTESTINAL:  #Prophylaxis  - Protonix 40mg IV daily while intubated    GENITOURINARY:  #Decreased urine output - resolved  - jackman placed after intubation  - Urine output improving, s/p lasix 20mg IVP x2 on 3/20.  - continue to monitor strict I&O, goal UOP>30cc/hr and net (-)500-1000cc  - F/U daily Cr on BMP      F: None  E: Replete K<4, Mg<2  N: jevity 1.5  G: protonix 40mg IV daily    Code: FULL CODE    Dispo: Patient requires continued monitoring in MICU

## 2020-03-22 NOTE — PROGRESS NOTE ADULT - SUBJECTIVE AND OBJECTIVE BOX
*** INCOMPLETE ***  HOSPITAL COURSE:    OVERNIGHT EVENTS:     INTERVAL EVENTS:    SUBJECTIVE HPI: Patient seen and examined at bedside.      VITAL SIGNS:  ICU Vital Signs Last 24 Hrs  T(C): 36.6 (22 Mar 2020 12:35), Max: 37.2 (21 Mar 2020 18:28)  T(F): 97.8 (22 Mar 2020 12:35), Max: 99 (21 Mar 2020 18:28)  HR: 71 (22 Mar 2020 14:00) (49 - 80)  BP: 82/47 (22 Mar 2020 09:00) (82/47 - 82/47)  BP(mean): 60 (22 Mar 2020 09:00) (60 - 60)  ABP: 115/71 (22 Mar 2020 14:00) (88/44 - 165/91)  ABP(mean): 90 (22 Mar 2020 14:00) (57 - 120)  RR: 26 (22 Mar 2020 14:00) (25 - 32)  SpO2: 93% (22 Mar 2020 14:00) (89% - 100%)      I&O's Summary    21 Mar 2020 07:01  -  22 Mar 2020 07:00  --------------------------------------------------------  IN: 1680.2 mL / OUT: 2480 mL / NET: -799.8 mL    22 Mar 2020 07:01  -  22 Mar 2020 15:19  --------------------------------------------------------  IN: 470.4 mL / OUT: 300 mL / NET: 170.4 mL        Ventilator settings:      PHYSICAL EXAM:  General: Comfortable, pleasant/anxious/agitated, Ill-appearing, well-nourished/frail/cachectic, comfortable / in distress  Neurological: AAOx3, no focal deficits  HEENT: NC/AT; EOMI, PERRL, clear conjunctiva, no nasal or oropharyngeal discharge or exudates, MMM  Neck: supple, no cervical or post-auricular lymphadenopathy  Cardiovascular: +S1/S2, no murmurs/rubs/gallops, RRR  Respiratory: CTA B/L, no diminished breath sounds, no wheezes/rales/rhonchi, no increased work of breathing or accessory muscle use  Gastrointestinal: soft, NT/ND; active BSx4 quadrants  Genitourinary: no suprapubic tenderness, no CVA tenderness  Extremities: WWP; no edema, clubbing or cyanosis  Vascular: 2+ radial, DP/PT pulses B/L  Skin: no rashes  Lines/Drains:       MEDICATIONS:  MEDICATIONS  (STANDING):  ascorbic acid IVPB 1500 milliGRAM(s) IV Intermittent every 6 hours  chlorhexidine 0.12% Liquid 15 milliLiter(s) Oral Mucosa every 12 hours  chlorhexidine 2% Cloths 1 Application(s) Topical <User Schedule>  dextrose 5%. 1000 milliLiter(s) (50 mL/Hr) IV Continuous <Continuous>  dextrose 50% Injectable 12.5 Gram(s) IV Push once  dextrose 50% Injectable 25 Gram(s) IV Push once  dextrose 50% Injectable 25 Gram(s) IV Push once  enoxaparin Injectable 40 milliGRAM(s) SubCutaneous every 24 hours  fentaNYL   Infusion. 0.5 MICROgram(s)/kG/Hr (3.18 mL/Hr) IV Continuous <Continuous>  hydroxychloroquine 200 milliGRAM(s) Oral every 12 hours  influenza   Vaccine 0.5 milliLiter(s) IntraMuscular once  insulin lispro (HumaLOG) corrective regimen sliding scale   SubCutaneous every 6 hours  midazolam Infusion 0.02 mG/kG/Hr (1.27 mL/Hr) IV Continuous <Continuous>  norepinephrine Infusion 0.05 MICROgram(s)/kG/Min (5.95 mL/Hr) IV Continuous <Continuous>  oseltamivir Suspension 75 milliGRAM(s) Enteral Tube every 12 hours  pantoprazole   Suspension 40 milliGRAM(s) Enteral Tube daily  petrolatum Ophthalmic Ointment 1 Application(s) Both EYES three times a day  piperacillin/tazobactam IVPB.. 3.375 Gram(s) IV Intermittent every 6 hours  propofol Infusion 10 MICROgram(s)/kG/Min (3.81 mL/Hr) IV Continuous <Continuous>  thiamine IVPB 200 milliGRAM(s) IV Intermittent <User Schedule>    MEDICATIONS  (PRN):  dextrose 40% Gel 15 Gram(s) Oral once PRN Blood Glucose LESS THAN 70 milliGRAM(s)/deciliter  glucagon  Injectable 1 milliGRAM(s) IntraMuscular once PRN Glucose LESS THAN 70 milligrams/deciliter      ALLERGIES/INTOLERANCES:  Allergies    Ceclor (Unknown)    Intolerances          LABS:                        10.7   4.21  )-----------( 195      ( 22 Mar 2020 06:21 )             31.3     Auto Neutrophil %: 77.0 % (03-21-20 @ 06:40)  Auto Lymphocyte #: 0.89 K/uL (03-21-20 @ 06:40)      03-22    140  |  105  |  2<L>  ----------------------------<  102<H>  3.1<L>   |  26  |  0.48<L>    Ca    7.5<L>      22 Mar 2020 06:20  Phos  3.5     03-21  Mg     2.0     03-21    TPro  4.1<L>  /  Alb  1.9<L>  /  TBili  0.3  /  DBili  x   /  AST  59<H>  /  ALT  37  /  AlkPhos  40  03-22    CAPILLARY BLOOD GLUCOSE      POCT Blood Glucose.: 136 mg/dL (22 Mar 2020 13:16)      PT/INR - ( 21 Mar 2020 06:40 )   PT: 11.0 sec;   INR: 0.97          PTT - ( 21 Mar 2020 06:40 )  PTT:31.1 sec      CARDIAC MARKERS ( 21 Mar 2020 06:40 )  x     / <0.01 ng/mL / 250 U/L / x     / x                ABG - ( 22 Mar 2020 06:07 )  pH, Arterial: 7.45  pH, Blood: x     /  pCO2: 38    /  pO2: 76    / HCO3: 26    / Base Excess: 1.9   /  SaO2: 95                  CoVID-specific labs:  Procalcitonin, Serum: 2.34 ng/mL <H> (03-22-20 @ 06:22)  Ferritin, Serum: 551 ng/mL <H> (03-22-20 @ 06:21)      CoVID q3d labs:  Sedimentation Rate, Erythrocyte: 27 mm/Hr (03-21-20 @ 06:40)  Lactate Dehydrogenase, Serum: 420 U/L (03-21-20 @ 06:40)  Creatine Kinase, Serum: 250 U/L (03-21-20 @ 06:40)      Microbiology:        RADIOLOGY, EKG AND ADDITIONAL TESTS: Reviewed.  CoVID Basline labs:  T Cell Subsets:  ABS CD3: 725 /uL (03-19-20 @ 16:51)  ABS CD4: 524 /uL (03-19-20 @ 16:51)  ABS CD8: 191 /uL (03-19-20 @ 16:51) INCOMPLETE    OVERNIGHT EVENTS: No acute events overnight    INTERVAL EVENTS: Triglycerides elevated this AM to 1393, likely 2/2 propofol administration. Sedation medications adjusted to decrease propofol (versed dose increased). Amylase and lipase ordered to r/o pancreatitis in setting of elevated triglycerides.     SUBJECTIVE HPI: Patient seen and examined at bedside.      VITAL SIGNS:  ICU Vital Signs Last 24 Hrs  T(C): 36.6 (22 Mar 2020 12:35), Max: 37.2 (21 Mar 2020 18:28)  T(F): 97.8 (22 Mar 2020 12:35), Max: 99 (21 Mar 2020 18:28)  HR: 71 (22 Mar 2020 14:00) (49 - 80)  BP: 82/47 (22 Mar 2020 09:00) (82/47 - 82/47)  BP(mean): 60 (22 Mar 2020 09:00) (60 - 60)  ABP: 115/71 (22 Mar 2020 14:00) (88/44 - 165/91)  ABP(mean): 90 (22 Mar 2020 14:00) (57 - 120)  RR: 26 (22 Mar 2020 14:00) (25 - 32)  SpO2: 93% (22 Mar 2020 14:00) (89% - 100%)      I&O's Summary    21 Mar 2020 07:01  -  22 Mar 2020 07:00  --------------------------------------------------------  IN: 1680.2 mL / OUT: 2480 mL / NET: -799.8 mL    22 Mar 2020 07:01  -  22 Mar 2020 15:19  --------------------------------------------------------  IN: 470.4 mL / OUT: 300 mL / NET: 170.4 mL      Ventilator settings: 40%/350/25/10      PHYSICAL EXAM:  General: Comfortable, pleasant/anxious/agitated, Ill-appearing, well-nourished/frail/cachectic, comfortable / in distress  Neurological: AAOx3, no focal deficits  HEENT: NC/AT; EOMI, PERRL, clear conjunctiva, no nasal or oropharyngeal discharge or exudates, MMM  Neck: supple, no cervical or post-auricular lymphadenopathy  Cardiovascular: +S1/S2, no murmurs/rubs/gallops, RRR  Respiratory: CTA B/L, no diminished breath sounds, no wheezes/rales/rhonchi, no increased work of breathing or accessory muscle use  Gastrointestinal: soft, NT/ND; active BSx4 quadrants  Genitourinary: no suprapubic tenderness, no CVA tenderness  Extremities: WWP; no edema, clubbing or cyanosis  Vascular: 2+ radial, DP/PT pulses B/L  Skin: no rashes  Lines/Drains:       MEDICATIONS:  MEDICATIONS  (STANDING):  ascorbic acid IVPB 1500 milliGRAM(s) IV Intermittent every 6 hours  chlorhexidine 0.12% Liquid 15 milliLiter(s) Oral Mucosa every 12 hours  chlorhexidine 2% Cloths 1 Application(s) Topical <User Schedule>  dextrose 5%. 1000 milliLiter(s) (50 mL/Hr) IV Continuous <Continuous>  dextrose 50% Injectable 12.5 Gram(s) IV Push once  dextrose 50% Injectable 25 Gram(s) IV Push once  dextrose 50% Injectable 25 Gram(s) IV Push once  enoxaparin Injectable 40 milliGRAM(s) SubCutaneous every 24 hours  fentaNYL   Infusion. 0.5 MICROgram(s)/kG/Hr (3.18 mL/Hr) IV Continuous <Continuous>  hydroxychloroquine 200 milliGRAM(s) Oral every 12 hours  influenza   Vaccine 0.5 milliLiter(s) IntraMuscular once  insulin lispro (HumaLOG) corrective regimen sliding scale   SubCutaneous every 6 hours  midazolam Infusion 0.02 mG/kG/Hr (1.27 mL/Hr) IV Continuous <Continuous>  norepinephrine Infusion 0.05 MICROgram(s)/kG/Min (5.95 mL/Hr) IV Continuous <Continuous>  oseltamivir Suspension 75 milliGRAM(s) Enteral Tube every 12 hours  pantoprazole   Suspension 40 milliGRAM(s) Enteral Tube daily  petrolatum Ophthalmic Ointment 1 Application(s) Both EYES three times a day  piperacillin/tazobactam IVPB.. 3.375 Gram(s) IV Intermittent every 6 hours  propofol Infusion 10 MICROgram(s)/kG/Min (3.81 mL/Hr) IV Continuous <Continuous>  thiamine IVPB 200 milliGRAM(s) IV Intermittent <User Schedule>    MEDICATIONS  (PRN):  dextrose 40% Gel 15 Gram(s) Oral once PRN Blood Glucose LESS THAN 70 milliGRAM(s)/deciliter  glucagon  Injectable 1 milliGRAM(s) IntraMuscular once PRN Glucose LESS THAN 70 milligrams/deciliter      ALLERGIES/INTOLERANCES:  Allergies: Ceclor (Unknown)      LABS:                        10.7   4.21  )-----------( 195      ( 22 Mar 2020 06:21 )             31.3     Auto Neutrophil %: 77.0 % (03-21-20 @ 06:40)  Auto Lymphocyte #: 0.89 K/uL (03-21-20 @ 06:40)      03-22    140  |  105  |  2<L>  ----------------------------<  102<H>  3.1<L>   |  26  |  0.48<L>    Ca    7.5<L>      22 Mar 2020 06:20  Phos  3.5     03-21  Mg     2.0     03-21    TPro  4.1<L>  /  Alb  1.9<L>  /  TBili  0.3  /  DBili  x   /  AST  59<H>  /  ALT  37  /  AlkPhos  40  03-22    CAPILLARY BLOOD GLUCOSE  POCT Blood Glucose.: 136 mg/dL (22 Mar 2020 13:16)      PT/INR - ( 21 Mar 2020 06:40 )   PT: 11.0 sec;   INR: 0.97     PTT - ( 21 Mar 2020 06:40 )  PTT:31.1 sec      CARDIAC MARKERS ( 21 Mar 2020 06:40 )  x     / <0.01 ng/mL / 250 U/L / x     / x          ABG - ( 22 Mar 2020 06:07 )  pH, Arterial: 7.45  pH, Blood: x     /  pCO2: 38    /  pO2: 76    / HCO3: 26    / Base Excess: 1.9   /  SaO2: 95          CoVID-specific labs:  Procalcitonin, Serum: 2.34 ng/mL <H> (03-22-20 @ 06:22)  Ferritin, Serum: 551 ng/mL <H> (03-22-20 @ 06:21)    CoVID q3d labs:  Sedimentation Rate, Erythrocyte: 27 mm/Hr (03-21-20 @ 06:40)  Lactate Dehydrogenase, Serum: 420 U/L (03-21-20 @ 06:40)  Creatine Kinase, Serum: 250 U/L (03-21-20 @ 06:40)      RADIOLOGY, EKG AND ADDITIONAL TESTS: Reviewed.  CoVID Basline labs:  T Cell Subsets:  ABS CD3: 725 /uL (03-19-20 @ 16:51)  ABS CD4: 524 /uL (03-19-20 @ 16:51)  ABS CD8: 191 /uL (03-19-20 @ 16:51) OVERNIGHT EVENTS: No acute events overnight    INTERVAL EVENTS: Triglycerides elevated this AM to 1393, likely 2/2 propofol administration. Sedation medications adjusted to decrease propofol (versed dose increased). Amylase and lipase ordered to r/o pancreatitis in setting of elevated triglycerides.     SUBJECTIVE HPI: Patient seen and examined at bedside.      VITAL SIGNS:  ICU Vital Signs Last 24 Hrs  T(C): 36.6 (22 Mar 2020 12:35), Max: 37.2 (21 Mar 2020 18:28)  T(F): 97.8 (22 Mar 2020 12:35), Max: 99 (21 Mar 2020 18:28)  HR: 71 (22 Mar 2020 14:00) (49 - 80)  BP: 82/47 (22 Mar 2020 09:00) (82/47 - 82/47)  BP(mean): 60 (22 Mar 2020 09:00) (60 - 60)  ABP: 115/71 (22 Mar 2020 14:00) (88/44 - 165/91)  ABP(mean): 90 (22 Mar 2020 14:00) (57 - 120)  RR: 26 (22 Mar 2020 14:00) (25 - 32)  SpO2: 93% (22 Mar 2020 14:00) (89% - 100%)      I&O's Summary    21 Mar 2020 07:01  -  22 Mar 2020 07:00  --------------------------------------------------------  IN: 1680.2 mL / OUT: 2480 mL / NET: -799.8 mL    22 Mar 2020 07:01  -  22 Mar 2020 15:19  --------------------------------------------------------  IN: 470.4 mL / OUT: 300 mL / NET: 170.4 mL      Ventilator settings: 40%/350/25/10      PHYSICAL EXAM: per fellow/attending  General: sedated on ventilator  Neurological: sedated however responsive to commands  HEENT: intubated  Respiratory: on ventilator, diminished breath sounds at bases b/l, rhonchi b/l  Gastrointestinal: soft, NT/ND  Extremities: WWP; trace dependent edema      MEDICATIONS:  MEDICATIONS  (STANDING):  ascorbic acid IVPB 1500 milliGRAM(s) IV Intermittent every 6 hours  chlorhexidine 0.12% Liquid 15 milliLiter(s) Oral Mucosa every 12 hours  chlorhexidine 2% Cloths 1 Application(s) Topical <User Schedule>  dextrose 5%. 1000 milliLiter(s) (50 mL/Hr) IV Continuous <Continuous>  dextrose 50% Injectable 12.5 Gram(s) IV Push once  dextrose 50% Injectable 25 Gram(s) IV Push once  dextrose 50% Injectable 25 Gram(s) IV Push once  enoxaparin Injectable 40 milliGRAM(s) SubCutaneous every 24 hours  fentaNYL   Infusion. 0.5 MICROgram(s)/kG/Hr (3.18 mL/Hr) IV Continuous <Continuous>  hydroxychloroquine 200 milliGRAM(s) Oral every 12 hours  influenza   Vaccine 0.5 milliLiter(s) IntraMuscular once  insulin lispro (HumaLOG) corrective regimen sliding scale   SubCutaneous every 6 hours  midazolam Infusion 0.02 mG/kG/Hr (1.27 mL/Hr) IV Continuous <Continuous>  norepinephrine Infusion 0.05 MICROgram(s)/kG/Min (5.95 mL/Hr) IV Continuous <Continuous>  oseltamivir Suspension 75 milliGRAM(s) Enteral Tube every 12 hours  pantoprazole   Suspension 40 milliGRAM(s) Enteral Tube daily  petrolatum Ophthalmic Ointment 1 Application(s) Both EYES three times a day  piperacillin/tazobactam IVPB.. 3.375 Gram(s) IV Intermittent every 6 hours  propofol Infusion 10 MICROgram(s)/kG/Min (3.81 mL/Hr) IV Continuous <Continuous>  thiamine IVPB 200 milliGRAM(s) IV Intermittent <User Schedule>    MEDICATIONS  (PRN):  dextrose 40% Gel 15 Gram(s) Oral once PRN Blood Glucose LESS THAN 70 milliGRAM(s)/deciliter  glucagon  Injectable 1 milliGRAM(s) IntraMuscular once PRN Glucose LESS THAN 70 milligrams/deciliter      ALLERGIES/INTOLERANCES:  Allergies: Ceclor (Unknown)      LABS:                        10.7   4.21  )-----------( 195      ( 22 Mar 2020 06:21 )             31.3     Auto Neutrophil %: 77.0 % (03-21-20 @ 06:40)  Auto Lymphocyte #: 0.89 K/uL (03-21-20 @ 06:40)      03-22    140  |  105  |  2<L>  ----------------------------<  102<H>  3.1<L>   |  26  |  0.48<L>    Ca    7.5<L>      22 Mar 2020 06:20  Phos  3.5     03-21  Mg     2.0     03-21    TPro  4.1<L>  /  Alb  1.9<L>  /  TBili  0.3  /  DBili  x   /  AST  59<H>  /  ALT  37  /  AlkPhos  40  03-22    CAPILLARY BLOOD GLUCOSE  POCT Blood Glucose.: 136 mg/dL (22 Mar 2020 13:16)      PT/INR - ( 21 Mar 2020 06:40 )   PT: 11.0 sec;   INR: 0.97     PTT - ( 21 Mar 2020 06:40 )  PTT:31.1 sec      CARDIAC MARKERS ( 21 Mar 2020 06:40 )  x     / <0.01 ng/mL / 250 U/L / x     / x          ABG - ( 22 Mar 2020 06:07 )  pH, Arterial: 7.45  pH, Blood: x     /  pCO2: 38    /  pO2: 76    / HCO3: 26    / Base Excess: 1.9   /  SaO2: 95          CoVID-specific labs:  Procalcitonin, Serum: 2.34 ng/mL <H> (03-22-20 @ 06:22)  Ferritin, Serum: 551 ng/mL <H> (03-22-20 @ 06:21)    CoVID q3d labs:  Sedimentation Rate, Erythrocyte: 27 mm/Hr (03-21-20 @ 06:40)  Lactate Dehydrogenase, Serum: 420 U/L (03-21-20 @ 06:40)  Creatine Kinase, Serum: 250 U/L (03-21-20 @ 06:40)      RADIOLOGY, EKG AND ADDITIONAL TESTS: Reviewed.  CoVID Basline labs:  T Cell Subsets:  ABS CD3: 725 /uL (03-19-20 @ 16:51)  ABS CD4: 524 /uL (03-19-20 @ 16:51)  ABS CD8: 191 /uL (03-19-20 @ 16:51)

## 2020-03-22 NOTE — PROCEDURE NOTE - NSPROCDETAILS_GEN_ALL_CORE
connected to ventilator/patient pre-oxygenated, tube inserted, placement confirmed
connected to a pressurized flush line/sutured in place/ultrasound guidance/hemostasis with direct pressure, dressing applied/positive blood return obtained via catheter/Seldinger technique/location identified, draped/prepped, sterile technique used, needle inserted/introduced/all materials/supplies accounted for at end of procedure
guidewire recovered

## 2020-03-22 NOTE — PROCEDURE NOTE - NSICDXPROCEDURE_GEN_ALL_CORE_FT
PROCEDURES:  Percutaneous arterial cannulation for monitoring 20-Mar-2020 18:52:49  Lata Wood  US Doppler guided insertion of central venous catheter 20-Mar-2020 18:50:23  Lata Wood
PROCEDURES:  Percutaneous arterial cannulation for monitoring 20-Mar-2020 18:52:49  Lata Wood
PROCEDURES:  US Doppler guided insertion of central venous catheter 20-Mar-2020 18:50:23  Lata Wood

## 2020-03-22 NOTE — PROCEDURE NOTE - NSINDICATIONS_GEN_A_CORE
critical patient/arterial puncture to obtain ABG's/blood sampling
critical illness
monitoring purposes/arterial puncture to obtain ABG's
respiratory distress

## 2020-03-23 LAB
4/8 RATIO: 3.53 RATIO — SIGNIFICANT CHANGE UP (ref 0.9–3.6)
ABS CD8: 279 /UL — SIGNIFICANT CHANGE UP (ref 142–740)
ALBUMIN SERPL ELPH-MCNC: 2.1 G/DL — LOW (ref 3.3–5)
ALP SERPL-CCNC: 48 U/L — SIGNIFICANT CHANGE UP (ref 40–120)
ALT FLD-CCNC: 55 U/L — HIGH (ref 10–45)
ANION GAP SERPL CALC-SCNC: 11 MMOL/L — SIGNIFICANT CHANGE UP (ref 5–17)
APTT BLD: 28.8 SEC — SIGNIFICANT CHANGE UP (ref 27.5–36.3)
AST SERPL-CCNC: 100 U/L — HIGH (ref 10–40)
BASE EXCESS BLDA CALC-SCNC: 3.7 MMOL/L — HIGH (ref -2–3)
BASOPHILS # BLD AUTO: 0 K/UL — SIGNIFICANT CHANGE UP (ref 0–0.2)
BASOPHILS NFR BLD AUTO: 0 % — SIGNIFICANT CHANGE UP (ref 0–2)
BILIRUB SERPL-MCNC: 0.3 MG/DL — SIGNIFICANT CHANGE UP (ref 0.2–1.2)
BUN SERPL-MCNC: 2 MG/DL — LOW (ref 7–23)
CALCIUM SERPL-MCNC: 7.4 MG/DL — LOW (ref 8.4–10.5)
CD16+CD56+ CELLS NFR BLD: 2 % — LOW (ref 5–23)
CD16+CD56+ CELLS NFR SPEC: 23 /UL — LOW (ref 71–410)
CD19 BLASTS SPEC-ACNC: 101 /UL — SIGNIFICANT CHANGE UP (ref 84–469)
CD19 BLASTS SPEC-ACNC: 7 % — SIGNIFICANT CHANGE UP (ref 6–24)
CD3 BLASTS SPEC-ACNC: 1318 /UL — SIGNIFICANT CHANGE UP (ref 672–1870)
CD3 BLASTS SPEC-ACNC: 90 % — HIGH (ref 59–83)
CD4 %: 69 % — HIGH (ref 30–62)
CD8 %: 20 % — SIGNIFICANT CHANGE UP (ref 12–36)
CHLORIDE SERPL-SCNC: 109 MMOL/L — HIGH (ref 96–108)
CO2 SERPL-SCNC: 26 MMOL/L — SIGNIFICANT CHANGE UP (ref 22–31)
CREAT SERPL-MCNC: 0.51 MG/DL — SIGNIFICANT CHANGE UP (ref 0.5–1.3)
CRP SERPL-MCNC: 1.02 MG/DL — HIGH (ref 0–0.4)
EOSINOPHIL # BLD AUTO: 0.05 K/UL — SIGNIFICANT CHANGE UP (ref 0–0.5)
EOSINOPHIL NFR BLD AUTO: 1 % — SIGNIFICANT CHANGE UP (ref 0–6)
FERRITIN SERPL-MCNC: 544 NG/ML — HIGH (ref 15–150)
GLUCOSE BLDC GLUCOMTR-MCNC: 109 MG/DL — HIGH (ref 70–99)
GLUCOSE BLDC GLUCOMTR-MCNC: 129 MG/DL — HIGH (ref 70–99)
GLUCOSE BLDC GLUCOMTR-MCNC: 92 MG/DL — SIGNIFICANT CHANGE UP (ref 70–99)
GLUCOSE BLDC GLUCOMTR-MCNC: 92 MG/DL — SIGNIFICANT CHANGE UP (ref 70–99)
GLUCOSE BLDC GLUCOMTR-MCNC: 98 MG/DL — SIGNIFICANT CHANGE UP (ref 70–99)
GLUCOSE SERPL-MCNC: 90 MG/DL — SIGNIFICANT CHANGE UP (ref 70–99)
HCO3 BLDA-SCNC: 27 MMOL/L — SIGNIFICANT CHANGE UP (ref 21–28)
HCT VFR BLD CALC: 30.1 % — LOW (ref 34.5–45)
HGB BLD-MCNC: 10 G/DL — LOW (ref 11.5–15.5)
INR BLD: 1 — SIGNIFICANT CHANGE UP (ref 0.88–1.16)
LIDOCAIN IGE QN: 147 U/L — HIGH (ref 7–60)
LYMPHOCYTES # BLD AUTO: 1.46 K/UL — SIGNIFICANT CHANGE UP (ref 1–3.3)
LYMPHOCYTES # BLD AUTO: 27 % — SIGNIFICANT CHANGE UP (ref 13–44)
MAGNESIUM SERPL-MCNC: 2 MG/DL — SIGNIFICANT CHANGE UP (ref 1.6–2.6)
MCHC RBC-ENTMCNC: 30.8 PG — SIGNIFICANT CHANGE UP (ref 27–34)
MCHC RBC-ENTMCNC: 33.2 GM/DL — SIGNIFICANT CHANGE UP (ref 32–36)
MCV RBC AUTO: 92.6 FL — SIGNIFICANT CHANGE UP (ref 80–100)
MONOCYTES # BLD AUTO: 0.43 K/UL — SIGNIFICANT CHANGE UP (ref 0–0.9)
MONOCYTES NFR BLD AUTO: 8 % — SIGNIFICANT CHANGE UP (ref 2–14)
NEUTROPHILS # BLD AUTO: 3.25 K/UL — SIGNIFICANT CHANGE UP (ref 1.8–7.4)
NEUTROPHILS NFR BLD AUTO: 59 % — SIGNIFICANT CHANGE UP (ref 43–77)
NRBC # BLD: SIGNIFICANT CHANGE UP /100 WBCS (ref 0–0)
PCO2 BLDA: 36 MMHG — SIGNIFICANT CHANGE UP (ref 32–45)
PH BLDA: 7.49 — HIGH (ref 7.35–7.45)
PHOSPHATE SERPL-MCNC: 2.6 MG/DL — SIGNIFICANT CHANGE UP (ref 2.5–4.5)
PLATELET # BLD AUTO: 224 K/UL — SIGNIFICANT CHANGE UP (ref 150–400)
PO2 BLDA: 66 MMHG — LOW (ref 83–108)
POTASSIUM SERPL-MCNC: 3.1 MMOL/L — LOW (ref 3.5–5.3)
POTASSIUM SERPL-SCNC: 3.1 MMOL/L — LOW (ref 3.5–5.3)
PROT SERPL-MCNC: 4 G/DL — LOW (ref 6–8.3)
PROTHROM AB SERPL-ACNC: 11.4 SEC — SIGNIFICANT CHANGE UP (ref 10–12.9)
RBC # BLD: 3.25 M/UL — LOW (ref 3.8–5.2)
RBC # FLD: 14.1 % — SIGNIFICANT CHANGE UP (ref 10.3–14.5)
SAO2 % BLDA: 93 % — LOW (ref 95–100)
SODIUM SERPL-SCNC: 146 MMOL/L — HIGH (ref 135–145)
T-CELL CD4 SUBSET PNL BLD: 985 /UL — SIGNIFICANT CHANGE UP (ref 489–1457)
WBC # BLD: 5.41 K/UL — SIGNIFICANT CHANGE UP (ref 3.8–10.5)
WBC # FLD AUTO: 5.41 K/UL — SIGNIFICANT CHANGE UP (ref 3.8–10.5)

## 2020-03-23 PROCEDURE — 99291 CRITICAL CARE FIRST HOUR: CPT

## 2020-03-23 RX ORDER — POTASSIUM CHLORIDE 20 MEQ
20 PACKET (EA) ORAL
Refills: 0 | Status: COMPLETED | OUTPATIENT
Start: 2020-03-23 | End: 2020-03-23

## 2020-03-23 RX ORDER — MIDAZOLAM HYDROCHLORIDE 1 MG/ML
0.02 INJECTION, SOLUTION INTRAMUSCULAR; INTRAVENOUS
Qty: 100 | Refills: 0 | Status: DISCONTINUED | OUTPATIENT
Start: 2020-03-23 | End: 2020-03-24

## 2020-03-23 RX ORDER — AZITHROMYCIN 500 MG/1
250 TABLET, FILM COATED ORAL DAILY
Refills: 0 | Status: COMPLETED | OUTPATIENT
Start: 2020-03-23 | End: 2020-03-26

## 2020-03-23 RX ORDER — PROPOFOL 10 MG/ML
10 INJECTION, EMULSION INTRAVENOUS
Qty: 1000 | Refills: 0 | Status: DISCONTINUED | OUTPATIENT
Start: 2020-03-23 | End: 2020-03-23

## 2020-03-23 RX ORDER — POTASSIUM CHLORIDE 20 MEQ
40 PACKET (EA) ORAL ONCE
Refills: 0 | Status: COMPLETED | OUTPATIENT
Start: 2020-03-23 | End: 2020-03-23

## 2020-03-23 RX ORDER — FAMOTIDINE 10 MG/ML
20 INJECTION INTRAVENOUS
Refills: 0 | Status: DISCONTINUED | OUTPATIENT
Start: 2020-03-23 | End: 2020-03-27

## 2020-03-23 RX ORDER — MIDAZOLAM HYDROCHLORIDE 1 MG/ML
0.02 INJECTION, SOLUTION INTRAMUSCULAR; INTRAVENOUS
Qty: 100 | Refills: 0 | Status: DISCONTINUED | OUTPATIENT
Start: 2020-03-23 | End: 2020-03-23

## 2020-03-23 RX ORDER — DEXMEDETOMIDINE HYDROCHLORIDE IN 0.9% SODIUM CHLORIDE 4 UG/ML
0.2 INJECTION INTRAVENOUS
Qty: 200 | Refills: 0 | Status: DISCONTINUED | OUTPATIENT
Start: 2020-03-23 | End: 2020-03-23

## 2020-03-23 RX ORDER — PROPOFOL 10 MG/ML
10 INJECTION, EMULSION INTRAVENOUS
Qty: 500 | Refills: 0 | Status: DISCONTINUED | OUTPATIENT
Start: 2020-03-23 | End: 2020-03-23

## 2020-03-23 RX ADMIN — DEXMEDETOMIDINE HYDROCHLORIDE IN 0.9% SODIUM CHLORIDE 3.18 MICROGRAM(S)/KG/HR: 4 INJECTION INTRAVENOUS at 17:11

## 2020-03-23 RX ADMIN — Medication 200 MILLIGRAM(S): at 00:44

## 2020-03-23 RX ADMIN — Medication 1 APPLICATION(S): at 05:12

## 2020-03-23 RX ADMIN — PIPERACILLIN AND TAZOBACTAM 100 GRAM(S): 4; .5 INJECTION, POWDER, LYOPHILIZED, FOR SOLUTION INTRAVENOUS at 09:40

## 2020-03-23 RX ADMIN — Medication 40 MILLIEQUIVALENT(S): at 08:02

## 2020-03-23 RX ADMIN — PROPOFOL 3.81 MICROGRAM(S)/KG/MIN: 10 INJECTION, EMULSION INTRAVENOUS at 02:28

## 2020-03-23 RX ADMIN — ENOXAPARIN SODIUM 40 MILLIGRAM(S): 100 INJECTION SUBCUTANEOUS at 22:00

## 2020-03-23 RX ADMIN — Medication 153 MILLIGRAM(S): at 03:13

## 2020-03-23 RX ADMIN — Medication 153 MILLIGRAM(S): at 19:11

## 2020-03-23 RX ADMIN — Medication 153 MILLIGRAM(S): at 11:06

## 2020-03-23 RX ADMIN — MIDAZOLAM HYDROCHLORIDE 1.27 MG/KG/HR: 1 INJECTION, SOLUTION INTRAMUSCULAR; INTRAVENOUS at 21:56

## 2020-03-23 RX ADMIN — PROPOFOL 3.81 MICROGRAM(S)/KG/MIN: 10 INJECTION, EMULSION INTRAVENOUS at 09:40

## 2020-03-23 RX ADMIN — HYDROMORPHONE HYDROCHLORIDE 10 MG/HR: 2 INJECTION INTRAMUSCULAR; INTRAVENOUS; SUBCUTANEOUS at 09:40

## 2020-03-23 RX ADMIN — Medication 75 MILLIGRAM(S): at 00:44

## 2020-03-23 RX ADMIN — PIPERACILLIN AND TAZOBACTAM 100 GRAM(S): 4; .5 INJECTION, POWDER, LYOPHILIZED, FOR SOLUTION INTRAVENOUS at 03:13

## 2020-03-23 RX ADMIN — AZITHROMYCIN 250 MILLIGRAM(S): 500 TABLET, FILM COATED ORAL at 12:23

## 2020-03-23 RX ADMIN — Medication 200 MILLIGRAM(S): at 12:23

## 2020-03-23 RX ADMIN — CHLORHEXIDINE GLUCONATE 1 APPLICATION(S): 213 SOLUTION TOPICAL at 07:56

## 2020-03-23 RX ADMIN — PROPOFOL 3.81 MICROGRAM(S)/KG/MIN: 10 INJECTION, EMULSION INTRAVENOUS at 12:23

## 2020-03-23 RX ADMIN — CHLORHEXIDINE GLUCONATE 15 MILLILITER(S): 213 SOLUTION TOPICAL at 09:42

## 2020-03-23 RX ADMIN — Medication 102 MILLIGRAM(S): at 05:11

## 2020-03-23 RX ADMIN — Medication 50 MILLIEQUIVALENT(S): at 08:02

## 2020-03-23 RX ADMIN — Medication 50 MILLIEQUIVALENT(S): at 10:24

## 2020-03-23 RX ADMIN — Medication 102 MILLIGRAM(S): at 18:18

## 2020-03-23 RX ADMIN — HYDROMORPHONE HYDROCHLORIDE 2 MG/HR: 2 INJECTION INTRAMUSCULAR; INTRAVENOUS; SUBCUTANEOUS at 10:45

## 2020-03-23 RX ADMIN — Medication 50 MILLIEQUIVALENT(S): at 12:23

## 2020-03-23 RX ADMIN — HYDROMORPHONE HYDROCHLORIDE 10 MG/HR: 2 INJECTION INTRAMUSCULAR; INTRAVENOUS; SUBCUTANEOUS at 08:03

## 2020-03-23 RX ADMIN — Medication 1 APPLICATION(S): at 15:05

## 2020-03-23 RX ADMIN — Medication 75 MILLIGRAM(S): at 12:23

## 2020-03-23 NOTE — PROGRESS NOTE ADULT - SUBJECTIVE AND OBJECTIVE BOX
HOSPITAL COURSE:  Pt is a 30 yo F with PMH recurrent PNA who presented with F/C, cough, and SOB x3d. Was seen at  3/16 for similar symptoms and was flu-A positive with CXR+ RUL consolidate. COVID swab was sent and pt was sent home with tamiflu, levaquin, and albuterol inhaler. Pt with persistent fevers to 103 and returned to ED for evaluation. On arrival, pt febrile 100.6, , BP 95/58, RR 20, and O2 sat 100% RA. Labs with WBC 8.29, 0.36 lymphocytes, 54.8% neutrophils. VBG with pH 7.29/37/19/18. CXR with diffuse pulmonary infiltrates. Pt given 1x levaquin 750mg, vancomycin 1g, zosyn 3.375g, and 1L NS. Admitted to MICU with c/f need for intubation in the setting of acute hypoxic respiratory distress 2/2 flu A vs. post-viral PNA vs. COVID infection. Intubated 3/19 for increasing oxygen demands and work of breathing. 3/20 COVID+ and pt with s/s ARDS being treated with proning/de-proning.     INTERVAL HPI/OVERNIGHT EVENTS:  A-line replaced overnight. Urine output stable.    SUBJECTIVE:   Patient seen and examined at bedside. Intubated and sedated. Does not participate in exam.    OBJECTIVE:    VITAL SIGNS:  ICU Vital Signs Last 24 Hrs  T(C): 36.3 (23 Mar 2020 12:00), Max: 36.8 (23 Mar 2020 06:40)  T(F): 97.3 (23 Mar 2020 12:00), Max: 98.3 (23 Mar 2020 06:40)  HR: 60 (23 Mar 2020 16:00) (55 - 77)  BP: 116/65 (23 Mar 2020 16:00) (81/52 - 151/83)  BP(mean): 85 (23 Mar 2020 16:00) (62 - 111)  ABP: 117/68 (23 Mar 2020 16:00) (82/46 - 158/96)  ABP(mean): 90 (23 Mar 2020 16:00) (61 - 131)  RR: 34 (23 Mar 2020 16:00) (2 - 38)  SpO2: 94% (23 Mar 2020 16:00) (91% - 100%)    Mode: AC/ CMV (Assist Control/ Continuous Mandatory Ventilation), RR (machine): 25, TV (machine): 350, FiO2: 40, PEEP: 8, ITime: 1, MAP: 15, PIP: 26    03-22 @ 07:01  -  03-23 @ 07:00  --------------------------------------------------------  IN: 1877.1 mL / OUT: 1100 mL / NET: 777.1 mL    03-23 @ 07:01  - 03-23 @ 17:14  --------------------------------------------------------  IN: 851.4 mL / OUT: 260 mL / NET: 591.4 mL      CAPILLARY BLOOD GLUCOSE      POCT Blood Glucose.: 92 mg/dL (23 Mar 2020 12:39)      PHYSICAL EXAM:    General: WDWN, intubated and sedated  HEENT: NC/AT; PERRL, clear conjunctiva, MMM, NGT in place with tube feeds running  Neck: supple  Respiratory: mechanical breath sounds, diminished breath sounds throughout  Cardiovascular: +S1/S2; RRR, no m/r/g  Abdomen: soft, NT/ND; +BS x4  Extremities: WWP, 2+ peripheral pulses b/l; no LE edema  Skin: normal color and turgor; no rash  Neurological: intubated and sedated  Lines: 3/18 intubated, NGT, jackman; 3/19 central line and a-line, 3/22 a-line replaced    MEDICATIONS:  MEDICATIONS  (STANDING):  ascorbic acid IVPB 1500 milliGRAM(s) IV Intermittent every 6 hours  azithromycin   Tablet 250 milliGRAM(s) Oral daily  chlorhexidine 2% Cloths 1 Application(s) Topical <User Schedule>  dexMEDEtomidine Infusion 0.2 MICROgram(s)/kG/Hr (3.18 mL/Hr) IV Continuous <Continuous>  dextrose 5%. 1000 milliLiter(s) (50 mL/Hr) IV Continuous <Continuous>  dextrose 50% Injectable 12.5 Gram(s) IV Push once  dextrose 50% Injectable 25 Gram(s) IV Push once  dextrose 50% Injectable 25 Gram(s) IV Push once  enoxaparin Injectable 40 milliGRAM(s) SubCutaneous every 24 hours  famotidine    Tablet 20 milliGRAM(s) Oral two times a day  HYDROmorphone Infusion 2 mG/Hr (10 mL/Hr) IV Continuous <Continuous>  hydroxychloroquine 200 milliGRAM(s) Oral every 12 hours  influenza   Vaccine 0.5 milliLiter(s) IntraMuscular once  insulin lispro (HumaLOG) corrective regimen sliding scale   SubCutaneous every 6 hours  midazolam Infusion 0.02 mG/kG/Hr (1.27 mL/Hr) IV Continuous <Continuous>  norepinephrine Infusion 0.05 MICROgram(s)/kG/Min (5.95 mL/Hr) IV Continuous <Continuous>  oseltamivir Suspension 75 milliGRAM(s) Enteral Tube every 12 hours  petrolatum Ophthalmic Ointment 1 Application(s) Both EYES three times a day  propofol Infusion 10 MICROgram(s)/kG/Min (3.81 mL/Hr) IV Continuous <Continuous>  thiamine IVPB 200 milliGRAM(s) IV Intermittent <User Schedule>    MEDICATIONS  (PRN):  dextrose 40% Gel 15 Gram(s) Oral once PRN Blood Glucose LESS THAN 70 milliGRAM(s)/deciliter  glucagon  Injectable 1 milliGRAM(s) IntraMuscular once PRN Glucose LESS THAN 70 milligrams/deciliter  HYDROmorphone  Injectable 1 milliGRAM(s) IV Push every 2 hours PRN Agitation      ALLERGIES:  Allergies    Ceclor (Unknown)    Intolerances    LABS:                        10.0   5.41  )-----------( 224      ( 23 Mar 2020 05:50 )             30.1     03-23    146<H>  |  109<H>  |  2<L>  ----------------------------<  90  3.1<L>   |  26  |  0.51    Ca    7.4<L>      23 Mar 2020 05:50  Phos  2.6     03-23  Mg     2.0     03-23    TPro  4.0<L>  /  Alb  2.1<L>  /  TBili  0.3  /  DBili  x   /  AST  100<H>  /  ALT  55<H>  /  AlkPhos  48  03-23    PT/INR - ( 23 Mar 2020 05:50 )   PT: 11.4 sec;   INR: 1.00          PTT - ( 23 Mar 2020 05:50 )  PTT:28.8 sec      RADIOLOGY & ADDITIONAL TESTS: Reviewed.

## 2020-03-23 NOTE — PROGRESS NOTE ADULT - ASSESSMENT
Pt is a 30 yo F with PMH recurrent PNA who presented with F/C, cough, and SOB x3d. Found to be COVID+ and admitted to MICU after intubation for acute hypoxemic respiratory failure 2/2 flu A, COVID, and possible superimposed bacterial infection.     NEUROLOGY:  #Sedation  - intubated and sedated  - precedex gtt, propofol gtt  - plan to wean sedation today in anticipation of extubation  - no longer on paralytic or pressors  - RAAS goal -1    PULMONARY:  #Acute Hypoxemic Respiratory Failure 2/2 ARDS 2/2 COVID  - pt presenting with 3d SOB, cough, F/C  - found to be COVID+  - CXR with BL patchy opacities   - admitted to MICU for increasing oxygen requirements and increased work of breathing  - intubated 3/18   - likely in the setting of known flu-A and COVID+ with possible superimposed bacterial PNA  - worsening CXR c/w ARDS and s/p proning-deproning protocol   - will wean sedation and attempt extubation today    #COVID+  - as above  - hydroxychloroquine 200mg BID (3/19-3/23)  - oseltamivir (3/19- )  - zosyn (3/19-3/25) - started for gram negative coverage in the setting of likely superimposed bacterial infection -- will dc today given improving oxygen requirements and decreased PEEP  - s/p 1x tocilizumab   - will start azithromycin 250mg daily for anti-inflamm    CARDIOVASCULAR:  #Hypotension  - pt intubated and sedated, on precedex and propofol gtt  - iatrogenic hypotension in the setting of sedatives  - initially requiring levophed gtt, now off  - as above, will wean sedatives today in anticipation of extubation  - goal MAP>65     GASTROINTESTINAL:  #Prophylaxis  - famotidine 20mg BID    GENITOURINARY:  #Decreased urine output - RESOLVED  - jackman placed after intubation 3/18  - stable urine output 35-50 cc/h  - continue to monitor  - if extubate today, can dc jackman and trial void    ID:  #COVID-19  - as above  - s/p tocilizumab 560mg IV x 1 dose (ID approved).  - HIV neg  - COVID Isolation precautions  - vit C and thiamine    #Flu A  - as above  - pt with known RVP+ flu A prior to admission  - c/w oseltamivir as above   - repeat RVP neg     #Sepsis 2/2 COVID+ vs. influenza A vs. superimposed bacterial PNA  - on arrival meeting 4/4 SIRS criteria (bands 33.9%, , RR 20, fever 100.6) with pulmonary source  - as above  - MRSA swab neg  - f/u BCx  - s/p azithromycin 500mg daily x3d (3/19-3/21)    RENAL:  #Hypernatremia  - Na 146 today  - will start 250cc FW flush q4h  - continue to monitor    SKIN:  #Access  - 3/18 intubated, NGT, jackman  - 3/19 central line, a-line  - 3/20 a-line replaced    F: None  E: Replete K<4, Mg<2  N: NGT in place with jevity 1.5  G: famotidine 20mg BID    Code: FULL CODE    Dispo: Patient requires continued monitoring in MICU Pt is a 28 yo F with PMH recurrent PNA who presented with F/C, cough, and SOB x3d. Found to be COVID+ and admitted to MICU after intubation for acute hypoxemic respiratory failure 2/2 flu A, COVID, and possible superimposed bacterial infection.     NEUROLOGY:  #Sedation  - intubated and sedated  - precedex gtt, propofol gtt  - plan to wean sedation today in anticipation of extubation  - no longer on paralytic or pressors  - RAAS goal -1    PULMONARY:  #Acute Hypoxemic Respiratory Failure 2/2 ARDS 2/2 COVID  - pt presenting with 3d SOB, cough, F/C  - found to be COVID+  - CXR with BL patchy opacities   - admitted to MICU for increasing oxygen requirements and increased work of breathing  - intubated 3/18   - likely in the setting of known flu-A and COVID+ with possible superimposed bacterial PNA  - worsening CXR c/w ARDS and s/p proning-deproning protocol   - will wean sedation and attempt extubation today    #COVID+  - as above  - hydroxychloroquine 200mg BID (3/19-3/23)  - oseltamivir (3/19- )  - zosyn (3/19-3/25) - started for gram negative coverage in the setting of likely superimposed bacterial infection -- will dc today given improving oxygen requirements and decreased PEEP  - s/p 1x tocilizumab   - will start azithromycin 250mg daily for anti-inflamm    CARDIOVASCULAR:  #Hypotension  - pt intubated and sedated, on precedex and propofol gtt  - iatrogenic hypotension in the setting of sedatives  - initially requiring levophed gtt, now off  - as above, will wean sedatives today in anticipation of extubation  - goal MAP>65     GASTROINTESTINAL:  #Prophylaxis  - famotidine 20mg BID    GENITOURINARY:  #Decreased urine output - RESOLVED  - jackman placed after intubation 3/18  - stable urine output 35-50 cc/h  - continue to monitor  - if extubate today, can dc jackman and trial void    ID:  #COVID-19  - as above  - s/p tocilizumab 560mg IV x 1 dose (ID approved).  - HIV neg  - COVID Isolation precautions  - vit C and thiamine    #Flu A  - as above  - pt with known RVP+ flu A prior to admission  - c/w oseltamivir as above   - repeat RVP neg     #Sepsis 2/2 COVID+ vs. influenza A vs. superimposed bacterial PNA  - on arrival meeting 4/4 SIRS criteria (bands 33.9%, , RR 20, fever 100.6) with pulmonary source  - as above  - MRSA swab neg  - f/u BCx  - s/p azithromycin 500mg daily x3d (3/19-3/21)    SKIN:  #Access  - 3/18 intubated, NGT, jackman  - 3/19 central line, a-line  - 3/20 a-line replaced    F: None  E: Replete K<4, Mg<2  N: NGT in place with jevity 1.5  G: famotidine 20mg BID    Code: FULL CODE    Dispo: Patient requires continued monitoring in MICU

## 2020-03-24 LAB
ALBUMIN SERPL ELPH-MCNC: 2.3 G/DL — LOW (ref 3.3–5)
ALP SERPL-CCNC: 56 U/L — SIGNIFICANT CHANGE UP (ref 40–120)
ALT FLD-CCNC: 88 U/L — HIGH (ref 10–45)
ANION GAP SERPL CALC-SCNC: 11 MMOL/L — SIGNIFICANT CHANGE UP (ref 5–17)
AST SERPL-CCNC: 161 U/L — HIGH (ref 10–40)
BASOPHILS # BLD AUTO: 0 K/UL — SIGNIFICANT CHANGE UP (ref 0–0.2)
BASOPHILS NFR BLD AUTO: 0 % — SIGNIFICANT CHANGE UP (ref 0–2)
BILIRUB SERPL-MCNC: 0.3 MG/DL — SIGNIFICANT CHANGE UP (ref 0.2–1.2)
BUN SERPL-MCNC: 2 MG/DL — LOW (ref 7–23)
CALCIUM SERPL-MCNC: 8 MG/DL — LOW (ref 8.4–10.5)
CHLORIDE SERPL-SCNC: 110 MMOL/L — HIGH (ref 96–108)
CK SERPL-CCNC: 249 U/L — HIGH (ref 25–170)
CO2 SERPL-SCNC: 25 MMOL/L — SIGNIFICANT CHANGE UP (ref 22–31)
CREAT SERPL-MCNC: 0.49 MG/DL — LOW (ref 0.5–1.3)
CRP SERPL-MCNC: 0.86 MG/DL — HIGH (ref 0–0.4)
EOSINOPHIL # BLD AUTO: 0 K/UL — SIGNIFICANT CHANGE UP (ref 0–0.5)
EOSINOPHIL NFR BLD AUTO: 0 % — SIGNIFICANT CHANGE UP (ref 0–6)
FERRITIN SERPL-MCNC: 565 NG/ML — HIGH (ref 15–150)
GAS PNL BLDA: SIGNIFICANT CHANGE UP
GLUCOSE BLDC GLUCOMTR-MCNC: 110 MG/DL — HIGH (ref 70–99)
GLUCOSE BLDC GLUCOMTR-MCNC: 119 MG/DL — HIGH (ref 70–99)
GLUCOSE BLDC GLUCOMTR-MCNC: 141 MG/DL — HIGH (ref 70–99)
GLUCOSE BLDC GLUCOMTR-MCNC: 93 MG/DL — SIGNIFICANT CHANGE UP (ref 70–99)
GLUCOSE SERPL-MCNC: 100 MG/DL — HIGH (ref 70–99)
HCT VFR BLD CALC: 30.6 % — LOW (ref 34.5–45)
HGB BLD-MCNC: 10.4 G/DL — LOW (ref 11.5–15.5)
LYMPHOCYTES # BLD AUTO: 0.55 K/UL — LOW (ref 1–3.3)
LYMPHOCYTES # BLD AUTO: 6.4 % — LOW (ref 13–44)
MAGNESIUM SERPL-MCNC: 2 MG/DL — SIGNIFICANT CHANGE UP (ref 1.6–2.6)
MCHC RBC-ENTMCNC: 31.5 PG — SIGNIFICANT CHANGE UP (ref 27–34)
MCHC RBC-ENTMCNC: 34 GM/DL — SIGNIFICANT CHANGE UP (ref 32–36)
MCV RBC AUTO: 92.7 FL — SIGNIFICANT CHANGE UP (ref 80–100)
MONOCYTES # BLD AUTO: 0.16 K/UL — SIGNIFICANT CHANGE UP (ref 0–0.9)
MONOCYTES NFR BLD AUTO: 1.9 % — LOW (ref 2–14)
NEUTROPHILS # BLD AUTO: 7.31 K/UL — SIGNIFICANT CHANGE UP (ref 1.8–7.4)
NEUTROPHILS NFR BLD AUTO: 84.4 % — HIGH (ref 43–77)
NRBC # BLD: SIGNIFICANT CHANGE UP /100 WBCS (ref 0–0)
PHOSPHATE SERPL-MCNC: 3.1 MG/DL — SIGNIFICANT CHANGE UP (ref 2.5–4.5)
PLATELET # BLD AUTO: 291 K/UL — SIGNIFICANT CHANGE UP (ref 150–400)
POTASSIUM SERPL-MCNC: 3.8 MMOL/L — SIGNIFICANT CHANGE UP (ref 3.5–5.3)
POTASSIUM SERPL-SCNC: 3.8 MMOL/L — SIGNIFICANT CHANGE UP (ref 3.5–5.3)
PROCALCITONIN SERPL-MCNC: 0.63 NG/ML — HIGH (ref 0.02–0.1)
PROT SERPL-MCNC: 4.4 G/DL — LOW (ref 6–8.3)
RBC # BLD: 3.3 M/UL — LOW (ref 3.8–5.2)
RBC # FLD: 14.3 % — SIGNIFICANT CHANGE UP (ref 10.3–14.5)
SODIUM SERPL-SCNC: 146 MMOL/L — HIGH (ref 135–145)
WBC # BLD: 8.57 K/UL — SIGNIFICANT CHANGE UP (ref 3.8–10.5)
WBC # FLD AUTO: 8.57 K/UL — SIGNIFICANT CHANGE UP (ref 3.8–10.5)

## 2020-03-24 PROCEDURE — 99291 CRITICAL CARE FIRST HOUR: CPT

## 2020-03-24 RX ORDER — QUETIAPINE FUMARATE 200 MG/1
12.5 TABLET, FILM COATED ORAL EVERY 12 HOURS
Refills: 0 | Status: DISCONTINUED | OUTPATIENT
Start: 2020-03-24 | End: 2020-03-27

## 2020-03-24 RX ORDER — POTASSIUM CHLORIDE 20 MEQ
20 PACKET (EA) ORAL ONCE
Refills: 0 | Status: COMPLETED | OUTPATIENT
Start: 2020-03-24 | End: 2020-03-24

## 2020-03-24 RX ORDER — ASCORBIC ACID 60 MG
1500 TABLET,CHEWABLE ORAL ONCE
Refills: 0 | Status: COMPLETED | OUTPATIENT
Start: 2020-03-24 | End: 2020-03-24

## 2020-03-24 RX ADMIN — Medication 75 MILLIGRAM(S): at 11:36

## 2020-03-24 RX ADMIN — QUETIAPINE FUMARATE 12.5 MILLIGRAM(S): 200 TABLET, FILM COATED ORAL at 18:01

## 2020-03-24 RX ADMIN — Medication 1500 MILLIGRAM(S): at 22:34

## 2020-03-24 RX ADMIN — Medication 75 MILLIGRAM(S): at 23:55

## 2020-03-24 RX ADMIN — CHLORHEXIDINE GLUCONATE 1 APPLICATION(S): 213 SOLUTION TOPICAL at 06:17

## 2020-03-24 RX ADMIN — Medication 20 MILLIEQUIVALENT(S): at 10:46

## 2020-03-24 RX ADMIN — Medication 75 MILLIGRAM(S): at 00:11

## 2020-03-24 RX ADMIN — FAMOTIDINE 20 MILLIGRAM(S): 10 INJECTION INTRAVENOUS at 06:17

## 2020-03-24 RX ADMIN — Medication 200 MILLIGRAM(S): at 00:11

## 2020-03-24 RX ADMIN — FAMOTIDINE 20 MILLIGRAM(S): 10 INJECTION INTRAVENOUS at 18:01

## 2020-03-24 RX ADMIN — Medication 153 MILLIGRAM(S): at 07:15

## 2020-03-24 RX ADMIN — Medication 153 MILLIGRAM(S): at 00:12

## 2020-03-24 RX ADMIN — ENOXAPARIN SODIUM 40 MILLIGRAM(S): 100 INJECTION SUBCUTANEOUS at 22:08

## 2020-03-24 RX ADMIN — AZITHROMYCIN 250 MILLIGRAM(S): 500 TABLET, FILM COATED ORAL at 11:36

## 2020-03-24 RX ADMIN — Medication 102 MILLIGRAM(S): at 06:17

## 2020-03-24 RX ADMIN — QUETIAPINE FUMARATE 12.5 MILLIGRAM(S): 200 TABLET, FILM COATED ORAL at 10:46

## 2020-03-24 NOTE — PROGRESS NOTE ADULT - SUBJECTIVE AND OBJECTIVE BOX
HOSPITAL COURSE:  Pt is a 28 yo F with PMH recurrent PNA who presented with F/C, cough, and SOB x3d. Was seen at  3/16 for similar symptoms and was flu-A positive with CXR+ RUL consolidate. COVID swab was sent and pt was sent home with tamiflu, levaquin, and albuterol inhaler. Pt with persistent fevers to 103 and returned to ED for evaluation. On arrival, pt febrile 100.6, , BP 95/58, RR 20, and O2 sat 100% RA. Labs with WBC 8.29, 0.36 lymphocytes, 54.8% neutrophils. VBG with pH 7.29/37/19/18. CXR with diffuse pulmonary infiltrates. Pt given 1x levaquin 750mg, vancomycin 1g, zosyn 3.375g, and 1L NS. Admitted to MICU with c/f need for intubation in the setting of acute hypoxic respiratory distress / flu A vs. post-viral PNA vs. COVID infection. Intubated 3/19 for increasing oxygen demands and work of breathing. 3/20 COVID+ and pt with s/s ARDS being treated with proning/de-proning.     INTERVAL HPI/OVERNIGHT EVENTS:  Started Vit C in the evening. Passed trial void and dysphagia screen. Started on mechanical soft diet.     SUBJECTIVE:   Patient seen and examined at bedside. Appears comfortable.    OBJECTIVE:    ICU Vital Signs Last 24 Hrs  T(C): 36.7 (24 Mar 2020 14:11), Max: 37.3 (24 Mar 2020 10:25)  T(F): 98 (24 Mar 2020 14:11), Max: 99.2 (24 Mar 2020 10:25)  HR: 76 (24 Mar 2020 15:00) (60 - 97)  BP: 119/78 (24 Mar 2020 15:00) (108/61 - 144/94)  BP(mean): 94 (24 Mar 2020 15:00) (81 - 114)  ABP: 154/95 (24 Mar 2020 12:00) (113/68 - 160/100)  ABP(mean): 119 (24 Mar 2020 12:00) (89 - 130)  RR: 38 (24 Mar 2020 15:00) (25 - 56)  SpO2: 100% (24 Mar 2020 15:00) (88% - 100%)      23 Mar 2020 07:01  -  24 Mar 2020 07:00  --------------------------------------------------------  IN:    dexmedetomidine Infusion: 37.6 mL    fentaNYL Infusion.: 19.1 mL    HYDROmorphone  Infusion: 50 mL    IV PiggyBack: 600 mL    midazolam Infusion: 16 mL    midazolam Infusion: 39 mL    Miscellaneous Tube Feedin mL    norepinephrine Infusion: 8 mL    Oral Fluid: 230 mL    propofol Infusion: 90.3 mL    Solution: 450 mL  Total IN: 1563 mL    OUT:    Indwelling Catheter - Urethral: 375 mL    Rectal Tube: 75 mL    Voided: 350 mL  Total OUT: 800 mL    Total NET: 763 mL      24 Mar 2020 07:01  -  24 Mar 2020 15:26  --------------------------------------------------------  IN:  Total IN: 0 mL    OUT:    Voided: 400 mL  Total OUT: 400 mL    Total NET: -400 mL    PHYSICAL EXAM:    General: WDWN, resting comfortably in bed  HEENT: NC/AT; PERRL, clear conjunctiva, MMM   Neck: supple  Respiratory: diminished breath sounds throughout, good air entry  Cardiovascular: +S1/S2; RRR, no m/r/g  Abdomen: soft, NT/ND; +BS x4  Extremities: WWP, 2+ peripheral pulses b/l; no LE edema  Skin: normal color and turgor; no rash  Neuro: AOx3, moves all extremities spontaneously, no focal deficits    MEDICATIONS  (STANDING):  ascorbic acid IVPB 1500 milliGRAM(s) IV Intermittent every 6 hours  azithromycin   Tablet 250 milliGRAM(s) Oral daily  chlorhexidine 2% Cloths 1 Application(s) Topical <User Schedule>  dextrose 5%. 1000 milliLiter(s) (50 mL/Hr) IV Continuous <Continuous>  dextrose 50% Injectable 12.5 Gram(s) IV Push once  dextrose 50% Injectable 25 Gram(s) IV Push once  dextrose 50% Injectable 25 Gram(s) IV Push once  enoxaparin Injectable 40 milliGRAM(s) SubCutaneous every 24 hours  famotidine    Tablet 20 milliGRAM(s) Oral two times a day  influenza   Vaccine 0.5 milliLiter(s) IntraMuscular once  insulin lispro (HumaLOG) corrective regimen sliding scale   SubCutaneous every 6 hours  oseltamivir Suspension 75 milliGRAM(s) Enteral Tube every 12 hours  QUEtiapine 12.5 milliGRAM(s) Oral every 12 hours    MEDICATIONS  (PRN):  dextrose 40% Gel 15 Gram(s) Oral once PRN Blood Glucose LESS THAN 70 milliGRAM(s)/deciliter  glucagon  Injectable 1 milliGRAM(s) IntraMuscular once PRN Glucose LESS THAN 70 milligrams/deciliter    ALLERGIES:  Allergies    Ceclor (Unknown)    Intolerances    LABS:                        10.4   8.57  )-----------( 291      ( 24 Mar 2020 06:05 )             30.6     03-24    146<H>  |  110<H>  |  2<L>  ----------------------------<  100<H>  3.8   |  25  |  0.49<L>    Ca    8.0<L>      24 Mar 2020 06:05  Phos  3.1     03-24  Mg     2.0     03-24    TPro  4.4<L>  /  Alb  2.3<L>  /  TBili  0.3  /  DBili  x   /  AST  161<H>  /  ALT  88<H>  /  AlkPhos  56  03-24    PT/INR - ( 23 Mar 2020 05:50 )   PT: 11.4 sec;   INR: 1.00          PTT - ( 23 Mar 2020 05:50 )  PTT:28.8 sec    CAPILLARY BLOOD GLUCOSE      POCT Blood Glucose.: 119 mg/dL (24 Mar 2020 11:10)      RADIOLOGY & ADDITIONAL TESTS: Reviewed.

## 2020-03-24 NOTE — CHART NOTE - NSCHARTNOTEFT_GEN_A_CORE
Admitting Diagnosis:   Patient is a 29y old  Female who presents with a chief complaint of Acute Hypoxic Respiratory Distress (23 Mar 2020 06:41)      PAST MEDICAL & SURGICAL HISTORY:  Anxiety  H/O hand surgery      Current Nutrition Order:  Mechanical Soft    PO Intake: Good (%) [   ]  Fair (50-75%) [   ] Poor (<25%) [   ]- ~30% intake at first meal this AM    GI Issues:     Pain:    Skin Integrity:   Reji 12, intact pressure-wise  No edema noted    Labs:   03-24    146<H>  |  110<H>  |  2<L>  ----------------------------<  100<H>  3.8   |  25  |  0.49<L>    Ca    8.0<L>      24 Mar 2020 06:05  Phos  3.1     03-24  Mg     2.0     03-24    TPro  4.4<L>  /  Alb  2.3<L>  /  TBili  0.3  /  DBili  x   /  AST  161<H>  /  ALT  88<H>  /  AlkPhos  56  03-24    CAPILLARY BLOOD GLUCOSE      POCT Blood Glucose.: 119 mg/dL (24 Mar 2020 11:10)  POCT Blood Glucose.: 110 mg/dL (24 Mar 2020 06:51)  POCT Blood Glucose.: 109 mg/dL (23 Mar 2020 23:24)  POCT Blood Glucose.: 98 mg/dL (23 Mar 2020 17:34)      Medications:  MEDICATIONS  (STANDING):  ascorbic acid IVPB 1500 milliGRAM(s) IV Intermittent every 6 hours  azithromycin   Tablet 250 milliGRAM(s) Oral daily  chlorhexidine 2% Cloths 1 Application(s) Topical <User Schedule>  dextrose 5%. 1000 milliLiter(s) (50 mL/Hr) IV Continuous <Continuous>  dextrose 50% Injectable 12.5 Gram(s) IV Push once  dextrose 50% Injectable 25 Gram(s) IV Push once  dextrose 50% Injectable 25 Gram(s) IV Push once  enoxaparin Injectable 40 milliGRAM(s) SubCutaneous every 24 hours  famotidine    Tablet 20 milliGRAM(s) Oral two times a day  influenza   Vaccine 0.5 milliLiter(s) IntraMuscular once  insulin lispro (HumaLOG) corrective regimen sliding scale   SubCutaneous every 6 hours  oseltamivir Suspension 75 milliGRAM(s) Enteral Tube every 12 hours  QUEtiapine 12.5 milliGRAM(s) Oral every 12 hours    MEDICATIONS  (PRN):  dextrose 40% Gel 15 Gram(s) Oral once PRN Blood Glucose LESS THAN 70 milliGRAM(s)/deciliter  glucagon  Injectable 1 milliGRAM(s) IntraMuscular once PRN Glucose LESS THAN 70 milligrams/deciliter      Weight: 63.5kg   Daily     Daily     Weight Change: No new weights recorded since admit     Nutrition Focused Physical Exam: Completed [   ]  Not Pertinent [ X  ]    Estimated energy needs: Height 65"; ABW 63.5kg; IBW 56.8kg; 112%IBW; BMI 23.3  ActualBW used for calculations as pt between % of IBW. Needs estimated for age and increased 2/2 +COVID infection  Calories: 30-35 kcal/kg = 4235-2716 kcal/day  Protein: 1.0-1.2 g/kg = 64-77g protein/day  Fluids: 30-35 mL/kg or per team     Subjective: 28 yo/female with PMHx recent recurrent PNA, presented w/fever, cough, and SOB. Admitted for R/O COVID vs. Flu vs. post-viral PNA. Pt intubated early in AM on 3/19 2/2 worsening tachypnea and desaturation to low/mid 80s. S/p 5 days of hydroxychloroquine and kaletra; deproned on 3/21. +COVID-19 and +influenza. Pt successfully extubated on 3/23; remains on NRB mask. Slightly delirious overnight and this AM, requiring seroquel. Afebrile. Unable to conduct a face to face interview or nutrition-focused physical exam due to limited contact restrictions related to the pt's medical condition and isolation precautions. Pt awake, though appearing sleepy. She passed dysphagia screen and started on mechanical soft diet. Observed <50% intake of breakfast.     Previous Nutrition Diagnosis:  Inadequate Energy Intake RT current NPO status AEB 0% of EER able to be met at this time.   Active [   ]  Resolved [ X  ]    If resolved, new PES: Increased protein-calorie needs RT increased demand for protein-calorie intake AEB +COVID infection, influenza     Goal: Pt will consistently meet % of EER w/o s/s aspiration     Recommendations:  1. Encourage PO intake; maintain aspiration precautions. Consider addition of Ensure if appetite poor  2. Recommend MVI   3. Monitor lytes and replete prn  4. Pain and bowel regimens per team   5. Weekly wts     Education:     Risk Level: High [ X  ] Moderate [   ] Low [   ] Admitting Diagnosis:   Patient is a 29y old  Female who presents with a chief complaint of Acute Hypoxic Respiratory Distress (23 Mar 2020 06:41)      PAST MEDICAL & SURGICAL HISTORY:  Anxiety  H/O hand surgery      Current Nutrition Order:  Mechanical Soft    PO Intake: Good (%) [   ]  Fair (50-75%) [   ] Poor (<25%) [   ]- ~30% intake at first meal this AM, ~60% intake of lunch per RN    GI Issues: No N/V/C/D reported at this time per RN  Last  3/24    Pain: No pain endorsed per RN     Skin Integrity:   Reji 12, intact pressure-wise  No edema noted    Labs:   03-24    146<H>  |  110<H>  |  2<L>  ----------------------------<  100<H>  3.8   |  25  |  0.49<L>    Ca    8.0<L>      24 Mar 2020 06:05  Phos  3.1     03-24  Mg     2.0     03-24    TPro  4.4<L>  /  Alb  2.3<L>  /  TBili  0.3  /  DBili  x   /  AST  161<H>  /  ALT  88<H>  /  AlkPhos  56  03-24    CAPILLARY BLOOD GLUCOSE      POCT Blood Glucose.: 119 mg/dL (24 Mar 2020 11:10)  POCT Blood Glucose.: 110 mg/dL (24 Mar 2020 06:51)  POCT Blood Glucose.: 109 mg/dL (23 Mar 2020 23:24)  POCT Blood Glucose.: 98 mg/dL (23 Mar 2020 17:34)      Medications:  MEDICATIONS  (STANDING):  ascorbic acid IVPB 1500 milliGRAM(s) IV Intermittent every 6 hours  azithromycin   Tablet 250 milliGRAM(s) Oral daily  chlorhexidine 2% Cloths 1 Application(s) Topical <User Schedule>  dextrose 5%. 1000 milliLiter(s) (50 mL/Hr) IV Continuous <Continuous>  dextrose 50% Injectable 12.5 Gram(s) IV Push once  dextrose 50% Injectable 25 Gram(s) IV Push once  dextrose 50% Injectable 25 Gram(s) IV Push once  enoxaparin Injectable 40 milliGRAM(s) SubCutaneous every 24 hours  famotidine    Tablet 20 milliGRAM(s) Oral two times a day  influenza   Vaccine 0.5 milliLiter(s) IntraMuscular once  insulin lispro (HumaLOG) corrective regimen sliding scale   SubCutaneous every 6 hours  oseltamivir Suspension 75 milliGRAM(s) Enteral Tube every 12 hours  QUEtiapine 12.5 milliGRAM(s) Oral every 12 hours    MEDICATIONS  (PRN):  dextrose 40% Gel 15 Gram(s) Oral once PRN Blood Glucose LESS THAN 70 milliGRAM(s)/deciliter  glucagon  Injectable 1 milliGRAM(s) IntraMuscular once PRN Glucose LESS THAN 70 milligrams/deciliter      Weight: 63.5kg   Daily     Daily     Weight Change: No new weights recorded since admit     Nutrition Focused Physical Exam: Completed [   ]  Not Pertinent [ X  ]    Estimated energy needs: Height 65"; ABW 63.5kg; IBW 56.8kg; 112%IBW; BMI 23.3  ActualBW used for calculations as pt between % of IBW. Needs estimated for age and increased 2/2 +COVID infection  Calories: 30-35 kcal/kg = 8618-8148 kcal/day  Protein: 1.0-1.2 g/kg = 64-77g protein/day  Fluids: 30-35 mL/kg or per team     Subjective: 30 yo/female with PMHx recent recurrent PNA, presented w/fever, cough, and SOB. Admitted for R/O COVID vs. Flu vs. post-viral PNA. Pt intubated early in AM on 3/19 2/2 worsening tachypnea and desaturation to low/mid 80s. S/p 5 days of hydroxychloroquine and kaletra; deproned on 3/21. +COVID-19 and +influenza. Pt successfully extubated on 3/23; remains on NRB mask. Unable to conduct a face to face interview or nutrition-focused physical exam due to limited contact restrictions related to the pt's medical condition and isolation precautions. Pt was sleeping when RD attempted to contact earlier (seen through door), and did not answer phone on second attempt. Slightly delirious overnight and this AM, requiring seroquel. Afebrile today. She passed dysphagia screen and started on mechanical soft diet. Observed <50% intake of breakfast, and improved to ~60% intake of lunch. Required some assistance w/feeding. Per RN, no complaints of N/V/C/D or pain. Continues to receive azithromycin and tamiflu. Unable to personally educate pt today, though discussed increased fluid needs w/RN. CRP/procal trending down, Ferritin and LFTs slightly up. Will continue to follow per RD protocol.     Previous Nutrition Diagnosis:  Inadequate Energy Intake RT current NPO status AEB 0% of EER able to be met at this time.   Active [   ]  Resolved [ X  ]    If resolved, new PES: Increased protein-calorie needs RT increased demand for protein-calorie intake AEB +COVID infection, influenza     Goal: Pt will consistently meet % of EER w/o s/s aspiration     Recommendations:  1. Encourage PO intake; maintain aspiration precautions. Consider addition of Ensure if appetite poor  2. Recommend MVI   3. Monitor lytes and replete prn  4. Pain and bowel regimens per team   5. Weekly wts     Education: Unable to educate at this time as pt sleeping; discussed w/RN encouraging pt to increase fluid and PO intake     Risk Level: High [ X  ] Moderate [   ] Low [   ]

## 2020-03-24 NOTE — PROGRESS NOTE ADULT - ASSESSMENT
Pt is a 28 yo F with PMH recurrent PNA who presented with F/C, cough, and SOB x3d. Found to be COVID+ and admitted to MICU after intubation for acute hypoxemic respiratory failure 2/2 flu A, COVID, and possible superimposed bacterial infection. Extubated 3/23 and stable on NRB.    PULMONARY:  #Acute Hypoxemic Respiratory Failure 2/2 ARDS 2/2 COVID  - pt presenting with 3d SOB, cough, F/C  - found to be COVID+  - CXR with BL patchy opacities   - admitted to MICU for increasing oxygen requirements and increased work of breathing  - intubated 3/18 and extubated 3/23 to NRB, stable  - likely in the setting of known flu-A and COVID+ with possible superimposed bacterial PNA  - worsening CXR c/w ARDS and s/p proning-deproning protocol     #COVID+  - as above  - s/p hydroxychloroquine 200mg BID (3/19-3/23)  - oseltamivir (3/19- )  - zosyn (3/19-3/25) - started for gram negative coverage in the setting of likely superimposed bacterial infection -- will dc today given improving oxygen requirements and decreased PEEP  - s/p 1x tocilizumab   - azithromycin 250mg daily for anti-inflamm    ID:  #COVID-19  - as above  - HIV neg  - COVID Isolation precautions  - vit C and thiamine    #Flu A  - as above  - pt with known RVP+ flu A prior to admission  - c/w oseltamivir as above   - repeat RVP neg     #Sepsis 2/2 COVID+ vs. influenza A vs. superimposed bacterial PNA  - on arrival meeting 4/4 SIRS criteria (bands 33.9%, , RR 20, fever 100.6) with pulmonary source  - as above  - MRSA swab neg  - f/u BCx    SKIN:  #Access  - 3/18 intubated, NGT, jackman  - 3/19 central line, a-line  - 3/20 a-line replaced  - 3/23 extubated  - 3/24 central line and a-line removed    F: None  E: Replete K<4, Mg<2  N: mechanical soft  G: none    Code: FULL CODE    Dispo: Patient requires continued monitoring in MICU

## 2020-03-25 DIAGNOSIS — J10.1 INFLUENZA DUE TO OTHER IDENTIFIED INFLUENZA VIRUS WITH OTHER RESPIRATORY MANIFESTATIONS: ICD-10-CM

## 2020-03-25 DIAGNOSIS — R63.8 OTHER SYMPTOMS AND SIGNS CONCERNING FOOD AND FLUID INTAKE: ICD-10-CM

## 2020-03-25 DIAGNOSIS — B97.21 SARS-ASSOCIATED CORONAVIRUS AS THE CAUSE OF DISEASES CLASSIFIED ELSEWHERE: ICD-10-CM

## 2020-03-25 LAB
A-TUMOR NECROSIS FACT SERPL-MCNC: <5 PG/ML — SIGNIFICANT CHANGE UP
ALBUMIN SERPL ELPH-MCNC: 2.8 G/DL — LOW (ref 3.3–5)
ALP SERPL-CCNC: 54 U/L — SIGNIFICANT CHANGE UP (ref 40–120)
ALT FLD-CCNC: 102 U/L — HIGH (ref 10–45)
ANION GAP SERPL CALC-SCNC: 11 MMOL/L — SIGNIFICANT CHANGE UP (ref 5–17)
AST SERPL-CCNC: 138 U/L — HIGH (ref 10–40)
BASOPHILS # BLD AUTO: 0.04 K/UL — SIGNIFICANT CHANGE UP (ref 0–0.2)
BASOPHILS NFR BLD AUTO: 0.4 % — SIGNIFICANT CHANGE UP (ref 0–2)
BILIRUB SERPL-MCNC: 0.4 MG/DL — SIGNIFICANT CHANGE UP (ref 0.2–1.2)
BUN SERPL-MCNC: 4 MG/DL — LOW (ref 7–23)
CALCIUM SERPL-MCNC: 8.1 MG/DL — LOW (ref 8.4–10.5)
CHLORIDE SERPL-SCNC: 107 MMOL/L — SIGNIFICANT CHANGE UP (ref 96–108)
CK SERPL-CCNC: 238 U/L — HIGH (ref 25–170)
CO2 SERPL-SCNC: 26 MMOL/L — SIGNIFICANT CHANGE UP (ref 22–31)
CREAT SERPL-MCNC: 0.5 MG/DL — SIGNIFICANT CHANGE UP (ref 0.5–1.3)
CRP SERPL-MCNC: 0.77 MG/DL — HIGH (ref 0–0.4)
D DIMER BLD IA.RAPID-MCNC: 913 NG/ML DDU — HIGH
EOSINOPHIL # BLD AUTO: 0 K/UL — SIGNIFICANT CHANGE UP (ref 0–0.5)
EOSINOPHIL NFR BLD AUTO: 0 % — SIGNIFICANT CHANGE UP (ref 0–6)
ERYTHROCYTE [SEDIMENTATION RATE] IN BLOOD: 12 MM/HR — SIGNIFICANT CHANGE UP
FERRITIN SERPL-MCNC: 411 NG/ML — HIGH (ref 15–150)
GLUCOSE BLDC GLUCOMTR-MCNC: 114 MG/DL — HIGH (ref 70–99)
GLUCOSE BLDC GLUCOMTR-MCNC: 89 MG/DL — SIGNIFICANT CHANGE UP (ref 70–99)
GLUCOSE BLDC GLUCOMTR-MCNC: 92 MG/DL — SIGNIFICANT CHANGE UP (ref 70–99)
GLUCOSE BLDC GLUCOMTR-MCNC: 97 MG/DL — SIGNIFICANT CHANGE UP (ref 70–99)
GLUCOSE SERPL-MCNC: 96 MG/DL — SIGNIFICANT CHANGE UP (ref 70–99)
HCT VFR BLD CALC: 32.6 % — LOW (ref 34.5–45)
HGB BLD-MCNC: 10.6 G/DL — LOW (ref 11.5–15.5)
IL10 SERPL-MCNC: 35 PG/ML — HIGH
IL12 SERPL-MCNC: <5 PG/ML — SIGNIFICANT CHANGE UP
IL13 SERPL-MCNC: <5 PG/ML — SIGNIFICANT CHANGE UP
IL2 SERPL-MCNC: 2065 PG/ML — HIGH
IL2 SERPL-MCNC: <5 PG/ML — SIGNIFICANT CHANGE UP
IL4 SERPL-MCNC: <5 PG/ML — SIGNIFICANT CHANGE UP
IL6 SERPL-MCNC: 320 PG/ML — HIGH
IL8 SERPL-MCNC: <5 PG/ML — SIGNIFICANT CHANGE UP
IMM GRANULOCYTES NFR BLD AUTO: 4.3 % — HIGH (ref 0–1.5)
INTERFERON GAMMA: 13 PG/ML — HIGH
INTERLEUKIN 1 BETA: <5 PG/ML — SIGNIFICANT CHANGE UP
INTERLEUKIN 17: <5 PG/ML — SIGNIFICANT CHANGE UP
INTERLEUKIN 5: <5 PG/ML — SIGNIFICANT CHANGE UP
LYMPHOCYTES # BLD AUTO: 2.58 K/UL — SIGNIFICANT CHANGE UP (ref 1–3.3)
LYMPHOCYTES # BLD AUTO: 24.8 % — SIGNIFICANT CHANGE UP (ref 13–44)
MAGNESIUM SERPL-MCNC: 2.2 MG/DL — SIGNIFICANT CHANGE UP (ref 1.6–2.6)
MCHC RBC-ENTMCNC: 30.4 PG — SIGNIFICANT CHANGE UP (ref 27–34)
MCHC RBC-ENTMCNC: 32.5 GM/DL — SIGNIFICANT CHANGE UP (ref 32–36)
MCV RBC AUTO: 93.4 FL — SIGNIFICANT CHANGE UP (ref 80–100)
MONOCYTES # BLD AUTO: 0.88 K/UL — SIGNIFICANT CHANGE UP (ref 0–0.9)
MONOCYTES NFR BLD AUTO: 8.5 % — SIGNIFICANT CHANGE UP (ref 2–14)
NEUTROPHILS # BLD AUTO: 6.45 K/UL — SIGNIFICANT CHANGE UP (ref 1.8–7.4)
NEUTROPHILS NFR BLD AUTO: 62 % — SIGNIFICANT CHANGE UP (ref 43–77)
NRBC # BLD: 0 /100 WBCS — SIGNIFICANT CHANGE UP (ref 0–0)
NT-PROBNP SERPL-SCNC: 1105 PG/ML — HIGH (ref 0–300)
PHOSPHATE SERPL-MCNC: 4 MG/DL — SIGNIFICANT CHANGE UP (ref 2.5–4.5)
PLATELET # BLD AUTO: 374 K/UL — SIGNIFICANT CHANGE UP (ref 150–400)
POTASSIUM SERPL-MCNC: 3.4 MMOL/L — LOW (ref 3.5–5.3)
POTASSIUM SERPL-SCNC: 3.4 MMOL/L — LOW (ref 3.5–5.3)
PROT SERPL-MCNC: 5 G/DL — LOW (ref 6–8.3)
RBC # BLD: 3.49 M/UL — LOW (ref 3.8–5.2)
RBC # FLD: 14.6 % — HIGH (ref 10.3–14.5)
SODIUM SERPL-SCNC: 144 MMOL/L — SIGNIFICANT CHANGE UP (ref 135–145)
TROPONIN T SERPL-MCNC: <0.01 NG/ML — SIGNIFICANT CHANGE UP (ref 0–0.01)
WBC # BLD: 10.4 K/UL — SIGNIFICANT CHANGE UP (ref 3.8–10.5)
WBC # FLD AUTO: 10.4 K/UL — SIGNIFICANT CHANGE UP (ref 3.8–10.5)

## 2020-03-25 PROCEDURE — 99233 SBSQ HOSP IP/OBS HIGH 50: CPT

## 2020-03-25 PROCEDURE — 99291 CRITICAL CARE FIRST HOUR: CPT

## 2020-03-25 RX ORDER — POTASSIUM CHLORIDE 20 MEQ
20 PACKET (EA) ORAL ONCE
Refills: 0 | Status: COMPLETED | OUTPATIENT
Start: 2020-03-25 | End: 2020-03-25

## 2020-03-25 RX ORDER — ACETAMINOPHEN 500 MG
650 TABLET ORAL EVERY 6 HOURS
Refills: 0 | Status: DISCONTINUED | OUTPATIENT
Start: 2020-03-25 | End: 2020-03-27

## 2020-03-25 RX ORDER — POTASSIUM CHLORIDE 20 MEQ
40 PACKET (EA) ORAL ONCE
Refills: 0 | Status: COMPLETED | OUTPATIENT
Start: 2020-03-25 | End: 2020-03-25

## 2020-03-25 RX ADMIN — QUETIAPINE FUMARATE 12.5 MILLIGRAM(S): 200 TABLET, FILM COATED ORAL at 06:06

## 2020-03-25 RX ADMIN — FAMOTIDINE 20 MILLIGRAM(S): 10 INJECTION INTRAVENOUS at 18:48

## 2020-03-25 RX ADMIN — CHLORHEXIDINE GLUCONATE 1 APPLICATION(S): 213 SOLUTION TOPICAL at 06:06

## 2020-03-25 RX ADMIN — Medication 40 MILLIEQUIVALENT(S): at 07:17

## 2020-03-25 RX ADMIN — QUETIAPINE FUMARATE 12.5 MILLIGRAM(S): 200 TABLET, FILM COATED ORAL at 18:48

## 2020-03-25 RX ADMIN — AZITHROMYCIN 250 MILLIGRAM(S): 500 TABLET, FILM COATED ORAL at 13:43

## 2020-03-25 RX ADMIN — ENOXAPARIN SODIUM 40 MILLIGRAM(S): 100 INJECTION SUBCUTANEOUS at 21:30

## 2020-03-25 RX ADMIN — Medication 20 MILLIEQUIVALENT(S): at 13:43

## 2020-03-25 RX ADMIN — FAMOTIDINE 20 MILLIGRAM(S): 10 INJECTION INTRAVENOUS at 06:06

## 2020-03-25 NOTE — PROGRESS NOTE ADULT - ASSESSMENT
Pt is a 28 yo F with PMH recurrent PNA who presented with F/C, cough, and SOB x3d. Found to be COVID+ and admitted to MICU after intubation for acute hypoxemic respiratory failure 2/2 flu A, COVID, and possible superimposed bacterial infection. Extubated 3/23 and stable on NRB.    PULMONARY:  #Acute Hypoxemic Respiratory Failure 2/2 ARDS 2/2 COVID  - pt presenting with 3d SOB, cough, F/C  - found to be COVID+  - CXR with BL patchy opacities   - admitted to MICU for increasing oxygen requirements and increased work of breathing  - intubated 3/18 and extubated 3/23 to NRB, stable  - deescalated to NC this morning and stable with O2sat >90%  - likely in the setting of known flu-A and COVID+ with possible superimposed bacterial PNA  - worsening CXR c/w ARDS and s/p proning-deproning protocol     #COVID+  - as above  - s/p hydroxychloroquine 200mg BID (3/19-3/23)  - oseltamivir (3/19- ) -- dc'd today  - zosyn (3/19-3/25) - started for gram negative coverage in the setting of likely superimposed bacterial infection -- will dc today given improving oxygen requirements and decreased PEEP  - s/p 1x tocilizumab   - azithromycin 250mg daily for anti-inflamm    ID:  #COVID-19  - as above  - HIV neg  - COVID Isolation precautions  - vit C and thiamine    #Flu A  - as above  - pt with known RVP+ flu A prior to admission  - dc'd oseltamivir as above   - repeat RVP neg     #Sepsis 2/2 COVID+ vs. influenza A vs. superimposed bacterial PNA  - on arrival meeting 4/4 SIRS criteria (bands 33.9%, , RR 20, fever 100.6) with pulmonary source  - as above  - MRSA swab neg  - f/u BCx    SKIN:  #Access  - 3/18 intubated, NGT, jackman  - 3/19 central line, a-line  - 3/20 a-line replaced  - 3/23 extubated, jackman removed  - 3/24 central line and a-line removed    F: None  E: Replete K<4, Mg<2  N: mechanical soft  G: none    Code: FULL CODE    Dispo: Patient requires continued monitoring in MICU Pt is a 30 yo F with PMH recurrent PNA who presented with F/C, cough, and SOB x3d. Found to be COVID+ and admitted to MICU after intubation for acute hypoxemic respiratory failure 2/2 flu A, COVID, and possible superimposed bacterial infection. Extubated 3/23 and stable on NRB.    PULMONARY:  #Acute Hypoxemic Respiratory Failure 2/2 ARDS 2/2 COVID  - pt presenting with 3d SOB, cough, F/C  - found to be COVID+  - CXR with BL patchy opacities   - admitted to MICU for increasing oxygen requirements and increased work of breathing  - intubated 3/18 and extubated 3/23 to NRB, stable  - deescalated to NC this morning and stable with O2sat >90%  - likely in the setting of known flu-A and COVID+ with possible superimposed bacterial PNA  - worsening CXR c/w ARDS and s/p proning-deproning protocol     #COVID+  - as above  - s/p hydroxychloroquine 200mg BID (3/19-3/23)  - oseltamivir (3/19- ) -- dc'd today  - zosyn (3/19-3/25) - started for gram negative coverage in the setting of likely superimposed bacterial infection -- will dc today given improving oxygen requirements and decreased PEEP  - s/p 1x tocilizumab   - azithromycin 250mg daily for anti-inflamm    ID:  #COVID-19  - as above  - HIV neg  - COVID Isolation precautions  - vit C and thiamine    #Flu A  - as above  - pt with known RVP+ flu A prior to admission  - dc'd oseltamivir as above   - repeat RVP neg     #Sepsis 2/2 COVID+ vs. influenza A vs. superimposed bacterial PNA  - on arrival meeting 4/4 SIRS criteria (bands 33.9%, , RR 20, fever 100.6) with pulmonary source  - as above  - MRSA swab neg  - f/u BCx    SKIN:  #Access  - 3/18 intubated, NGT, jackman  - 3/19 central line, a-line  - 3/20 a-line replaced  - 3/23 extubated, jackman removed  - 3/24 central line and a-line removed    F: None  E: Replete K<4, Mg<2  N: mechanical soft  G: none    Code: FULL CODE    Dispo: s/d COVID TELE

## 2020-03-25 NOTE — PROGRESS NOTE ADULT - PROBLEM SELECTOR PLAN 1
Pt presented with 3 days of SOB, cough, found to be COVID+. CXR with patchy opacities. Was intubated in MICU on 3/18, extubated on 3/23, now on NC. S/p 5-day course of hydroxychloroquine, 7 day course of zosyn, 1 dose tocilizumab, 5 days thiamine and vitamin c  - Continue azithromycin 250mg daily  - Pepcid 20mg BID  - Continue to trend COVID labs  - NC, wean as tolerated  - Maintain contact and droplet isolation precautions  - Tylenol for fever (last fever on 3/20)

## 2020-03-25 NOTE — PROGRESS NOTE ADULT - SUBJECTIVE AND OBJECTIVE BOX
Transfer acceptance from MICU to [facility]  Hospital course:  Pt is a 29F with PMH recurrent PNA who presented 3/18 with F/C, cough, and SOB x3d. Was seen at  3/16 for similar symptoms and was flu-A positive with CXR+ RUL consolidate. COVID swab was sent and pt was sent home with tamiflu, levaquin, and albuterol inhaler. Pt with persistent fevers to 103 and returned to ED for evaluation. On arrival, pt febrile 100.6, , BP 95/58, RR 20, and O2 sat 100% RA. Labs with WBC 8.29, 0.36 lymphocytes, 54.8% neutrophils. VBG with pH 7.29/37/19/18. CXR with diffuse pulmonary infiltrates. Pt given 1x levaquin 750mg, vancomycin 1g, zosyn 3.375g, and 1L NS. Admitted to MICU with c/f need for intubation in the setting of acute hypoxic respiratory distress 2/2 flu A vs. post-viral PNA vs. COVID infection. Intubated 3/19 for increasing oxygen demands and work of breathing. 3/20 COVID resulted positive and pt with s/s ARDS being treated with proning/de-proning. S/p 1x tocilizumab, 5d plaquenil, and Kaletra. Now on vit C and thiamine. Extubated 3/23 to NRB with mild tachypnea and increased oxygen requirements, stable overnight on venti mask and now deescalated to NC. Now stepped down to COVID-[facility] for continued monitoring.    OVERNIGHT EVENTS:  No acute events overnight.    SUBJECTIVE / INTERVAL HPI: Patient seen and examined at bedside.    VITAL SIGNS:  Vital Signs Last 24 Hrs  T(C): 36.8 (25 Mar 2020 16:32), Max: 37 (24 Mar 2020 22:00)  T(F): 98.2 (25 Mar 2020 16:32), Max: 98.6 (24 Mar 2020 22:00)  HR: 78 (25 Mar 2020 16:32) (53 - 86)  BP: 124/82 (25 Mar 2020 16:32) (124/74 - 160/79)  BP(mean): 99 (25 Mar 2020 16:32) (93 - 111)  RR: 20 (25 Mar 2020 16:32) (20 - 58)  SpO2: 96% (25 Mar 2020 16:32) (89% - 100%)    PHYSICAL EXAM:    General: Lying in bed, NAD, on NC  HEENT: NC/AT, PERRL, anicteric sclera; MMM  Neck: supple  Cardiovascular: +S1/S2, RRR  Respiratory: Diminished breath sounds  Gastrointestinal: soft, NT/ND, +BS  Extremities: WWP, no edema or cyanosis  Vascular: 2+ radial, DP/PT pulses B/L  Neurological: AAOx3, no focal deficits    MEDICATIONS:  MEDICATIONS  (STANDING):  azithromycin   Tablet 250 milliGRAM(s) Oral daily  chlorhexidine 2% Cloths 1 Application(s) Topical <User Schedule>  dextrose 5%. 1000 milliLiter(s) (50 mL/Hr) IV Continuous <Continuous>  dextrose 50% Injectable 12.5 Gram(s) IV Push once  dextrose 50% Injectable 25 Gram(s) IV Push once  dextrose 50% Injectable 25 Gram(s) IV Push once  enoxaparin Injectable 40 milliGRAM(s) SubCutaneous every 24 hours  famotidine    Tablet 20 milliGRAM(s) Oral two times a day  influenza   Vaccine 0.5 milliLiter(s) IntraMuscular once  insulin lispro (HumaLOG) corrective regimen sliding scale   SubCutaneous every 6 hours  QUEtiapine 12.5 milliGRAM(s) Oral every 12 hours    MEDICATIONS  (PRN):  dextrose 40% Gel 15 Gram(s) Oral once PRN Blood Glucose LESS THAN 70 milliGRAM(s)/deciliter  glucagon  Injectable 1 milliGRAM(s) IntraMuscular once PRN Glucose LESS THAN 70 milligrams/deciliter      ALLERGIES:  Allergies    Ceclor (Unknown)    Intolerances        LABS:                        10.6   10.40 )-----------( 374      ( 25 Mar 2020 05:47 )             32.6     03-25    144  |  107  |  4<L>  ----------------------------<  96  3.4<L>   |  26  |  0.50    Ca    8.1<L>      25 Mar 2020 05:47  Phos  4.0     03-25  Mg     2.2     03-25    TPro  5.0<L>  /  Alb  2.8<L>  /  TBili  0.4  /  DBili  x   /  AST  138<H>  /  ALT  102<H>  /  AlkPhos  54  03-25        CAPILLARY BLOOD GLUCOSE      POCT Blood Glucose.: 114 mg/dL (25 Mar 2020 11:44)      RADIOLOGY & ADDITIONAL TESTS: Reviewed.

## 2020-03-25 NOTE — PROGRESS NOTE ADULT - PROBLEM SELECTOR PLAN 3
F: None  E: Replete PRN for K<4, Mg<2  N: Mechanical soft  DVT PPx: Lovenox  GI PPx: Pepcid  Code status: Full code  Dispo: COVID-tele

## 2020-03-25 NOTE — PROGRESS NOTE ADULT - ATTENDING COMMENTS
Hx of recurrent pneumonias of unclear etiology. Acute hypoxemic respiratory failure secondary to COVID19. S/p prone ventilation, tocilizumab, and plaquenil. Doing well s/p extubation. Floor eligible.
This is a 29-year-old woman with a past medical history of recurrent pneumonias (cause unknown) who was admitted with fevers and shortness of breath.  The patient was found to be in acute hypoxemic respiratory failure and was found to have both influenza A as well as COVID positive.  CAT scan of the chest demonstrated bilateral groundglass opacities.  The patient was intubated and mechanically ventilated.  On today's examination, the patient remains mechanically ventilated.  The vital signs were stable.  The patient's temperature spike is coming down.  There is hemodynamic stability.  The later settings demonstrate assist volume control mode of mechanical ventilation with a tidal volume of 350 mL and an FiO2 of 40%.  The PEEP is set at 12 cm.  The respiratory rate is 30 (patient is not overbreathing).  She remains on fentanyl, Versed and levo fed.  The Levophed is being gradually tapered.  Arterial blood gas demonstrates a pH of 7.44/PaCO2 of 33 and a PaO2 of 148.  Markers of inflammation are being followed.  Ferritin is 650.  CRP has trended very satisfactorily from 34 to approximately 3.  Procalcitonin has dropped from 15-4  Her function tests are being closely monitored.  The patient remains on Lovenox.  She has received hydroxychloroquine sulfate along with Tocilizumab.  Vancomycin and Zosyn were administered.  We will continue to provide ventilatory support.  The patient will be put in the supine position.  His PO2 will be closely monitored
severe pna, progressive hypoxic resp failure. r/o COVID  admitted to MICU  with progressive dyspnea, required intubation [facility] vent  treat with abx emprically until covid test resulted.  iv sedation, required versed as well as propofol and fentanyl  abg pending, adjust peep as needed.  plan for arterial catheter  noted copious watery frothy (not blood tinged) secretions at intubation.  patient had been reluctant to be intubated, however also stated she did not want to die. discussed with mother by phone, who stated patient should be treated as needed to promote her recooverey.
Hx of recurrent pneumonias of unclear etiology. Acute hypoxemic respiratory failure secondary to COVID19. S/p prone ventilation, tocilizumab, and plaquenil. Doing well s/p extubation. Floor eligible.
Hx of recurrent pneumonias of unclear etiology. Acute hypoxemic respiratory failure secondary to COVID19. S/p prone ventilation, tocilizumab, and plaquenil. Extubate today, doing well on pressure support. Continue with azithromycin.
I evaluated this patient with the medical team and done a bedside physical examination on her today.    No notable events were reported last night.  Patient is afebrile.  Her heart rate remains 50/min.  Her blood pressure is 90/50 with a mean arterial pressure of 68.  She remains intubated and mechanically ventilated on volume assist control mode of mechanical ventilation.  Her set rate is 25/min.  She is breathing at 25/min.  Her deliver tidal volumes are 350 mL.  She is on 40% FiO2 with 10 cm of PEEP.  Her SPO2 at the time of examination was 95 to 98%.  Her ABG on the settings were 7.45/PaCO2 of 38/PaO2 of 76.  Her P to F ratio is 190  He remains on fentanyl/midazolam/propofol in high doses.  Levophed has been very significantly reduced.  Markers of inflammation are being monitored.  Her WBC count is normal.  Her ferritin has come down from 660-551.  Her CRP is 1.6.  Triglycerides are significantly elevated.  This may at least in part be due to the high doses of propofol that she is receiving.  We will check a lipase and amylase and reduce her propofol.  If necessary we would consider starting on Precedex.  However her heart rate of 50/min may fall further with Precedex.  We will check an EKG on her to ensure that the patient has P waves.  Continue 7-day course of Zosyn.  Extend course of Tamiflu.  Condition is critical: Prognosis guarded

## 2020-03-25 NOTE — PROGRESS NOTE ADULT - SUBJECTIVE AND OBJECTIVE BOX
S/D MICU TO 7L COVID [facility] COURSE:  Pt is a 28 yo F with PMH recurrent PNA who presented with F/C, cough, and SOB x3d. Was seen at  3/16 for similar symptoms and was flu-A positive with CXR+ RUL consolidate. COVID swab was sent and pt was sent home with tamiflu, levaquin, and albuterol inhaler. Pt with persistent fevers to 103 and returned to ED for evaluation. On arrival, pt febrile 100.6, , BP 95/58, RR 20, and O2 sat 100% RA. Labs with WBC 8.29, 0.36 lymphocytes, 54.8% neutrophils. VBG with pH 7.29/37/19/18. CXR with diffuse pulmonary infiltrates. Pt given 1x levaquin 750mg, vancomycin 1g, zosyn 3.375g, and 1L NS. Admitted to MICU with c/f need for intubation in the setting of acute hypoxic respiratory distress 2/2 flu A vs. post-viral PNA vs. COVID infection. Intubated 3/19 for increasing oxygen demands and work of breathing. 3/20 COVID+ and pt with s/s ARDS being treated with proning/de-proning. s/p 1x tocilizumab, 5d plaquenil, and klaetra. Now on vit C and thiamine. Extubated 3/23 to NRB with mild tachypnea and increased oxygen requirements, stable overnight on venti mask and now deescalated to NC. Stable for s/d 7L.    INTERVAL HPI/OVERNIGHT EVENTS:  No acute events overnight. Urine output stable.    SUBJECTIVE:   Patient seen and examined at bedside. Appears comfortable. Deescalated from venti mask to NC and maintaining O2 sat >90%.     OBJECTIVE:    ICU Vital Signs Last 24 Hrs  T(C): 36.2 (25 Mar 2020 14:34), Max: 37 (24 Mar 2020 22:00)  T(F): 97.1 (25 Mar 2020 14:34), Max: 98.6 (24 Mar 2020 22:00)  HR: 63 (25 Mar 2020 14:00) (53 - 86)  BP: 126/85 (25 Mar 2020 12:00) (124/74 - 160/79)  BP(mean): 100 (25 Mar 2020 12:00) (93 - 111)  ABP: --  ABP(mean): --  RR: 38 (25 Mar 2020 14:00) (27 - 58)  SpO2: 97% (25 Mar 2020 14:00) (89% - 100%)      24 Mar 2020 07:01  -  25 Mar 2020 07:00  --------------------------------------------------------  IN:  Total IN: 0 mL    OUT:    Voided: 950 mL  Total OUT: 950 mL    Total NET: -950 mL      25 Mar 2020 07:01  -  25 Mar 2020 15:06  --------------------------------------------------------  IN:  Total IN: 0 mL    OUT:    Voided: 200 mL  Total OUT: 200 mL    Total NET: -200 mL      PHYSICAL EXAM:    General: WDWN, resting comfortably in bed  HEENT: NC/AT; PERRL, clear conjunctiva, MMM   Neck: supple  Respiratory: diminished breath sounds throughout, good air entry, NC in place  Cardiovascular: +S1/S2; RRR, no m/r/g  Abdomen: soft, NT/ND; +BS x4  Extremities: WWP, 2+ peripheral pulses b/l; no LE edema  Skin: normal color and turgor; no rash  Neuro: AOx3, moves all extremities spontaneously, no focal deficits    MEDICATIONS  (STANDING):  azithromycin   Tablet 250 milliGRAM(s) Oral daily  chlorhexidine 2% Cloths 1 Application(s) Topical <User Schedule>  dextrose 5%. 1000 milliLiter(s) (50 mL/Hr) IV Continuous <Continuous>  dextrose 50% Injectable 12.5 Gram(s) IV Push once  dextrose 50% Injectable 25 Gram(s) IV Push once  dextrose 50% Injectable 25 Gram(s) IV Push once  enoxaparin Injectable 40 milliGRAM(s) SubCutaneous every 24 hours  famotidine    Tablet 20 milliGRAM(s) Oral two times a day  influenza   Vaccine 0.5 milliLiter(s) IntraMuscular once  insulin lispro (HumaLOG) corrective regimen sliding scale   SubCutaneous every 6 hours  QUEtiapine 12.5 milliGRAM(s) Oral every 12 hours    MEDICATIONS  (PRN):  dextrose 40% Gel 15 Gram(s) Oral once PRN Blood Glucose LESS THAN 70 milliGRAM(s)/deciliter  glucagon  Injectable 1 milliGRAM(s) IntraMuscular once PRN Glucose LESS THAN 70 milligrams/deciliter      ALLERGIES:  Allergies    Ceclor (Unknown)    Intolerances    LABS:                        10.6   10.40 )-----------( 374      ( 25 Mar 2020 05:47 )             32.6     03-25    144  |  107  |  4<L>  ----------------------------<  96  3.4<L>   |  26  |  0.50    Ca    8.1<L>      25 Mar 2020 05:47  Phos  4.0     03-25  Mg     2.2     03-25    TPro  5.0<L>  /  Alb  2.8<L>  /  TBili  0.4  /  DBili  x   /  AST  138<H>  /  ALT  102<H>  /  AlkPhos  54  03-25        CAPILLARY BLOOD GLUCOSE      POCT Blood Glucose.: 114 mg/dL (25 Mar 2020 11:44)      RADIOLOGY & ADDITIONAL TESTS: Reviewed.

## 2020-03-25 NOTE — PROGRESS NOTE ADULT - ASSESSMENT
Pt is a 29F COVID positive with PMH recurrent PNA who presented 3/18 with F/C, cough, and SOB x3d. Was intubated in MICU with proning for acute hypoxic respiratory failure and now extubated. Transferred to [facility] for continued monitoring.

## 2020-03-26 LAB
ALBUMIN SERPL ELPH-MCNC: 2.8 G/DL — LOW (ref 3.3–5)
ALP SERPL-CCNC: 48 U/L — SIGNIFICANT CHANGE UP (ref 40–120)
ALT FLD-CCNC: 73 U/L — HIGH (ref 10–45)
ANION GAP SERPL CALC-SCNC: 11 MMOL/L — SIGNIFICANT CHANGE UP (ref 5–17)
AST SERPL-CCNC: 56 U/L — HIGH (ref 10–40)
BASOPHILS # BLD AUTO: 0.05 K/UL — SIGNIFICANT CHANGE UP (ref 0–0.2)
BASOPHILS NFR BLD AUTO: 0.6 % — SIGNIFICANT CHANGE UP (ref 0–2)
BILIRUB SERPL-MCNC: 0.4 MG/DL — SIGNIFICANT CHANGE UP (ref 0.2–1.2)
BUN SERPL-MCNC: 7 MG/DL — SIGNIFICANT CHANGE UP (ref 7–23)
CALCIUM SERPL-MCNC: 8.4 MG/DL — SIGNIFICANT CHANGE UP (ref 8.4–10.5)
CHLORIDE SERPL-SCNC: 107 MMOL/L — SIGNIFICANT CHANGE UP (ref 96–108)
CK SERPL-CCNC: 173 U/L — HIGH (ref 25–170)
CO2 SERPL-SCNC: 25 MMOL/L — SIGNIFICANT CHANGE UP (ref 22–31)
CREAT SERPL-MCNC: 0.65 MG/DL — SIGNIFICANT CHANGE UP (ref 0.5–1.3)
CRP SERPL-MCNC: 0.51 MG/DL — HIGH (ref 0–0.4)
D DIMER BLD IA.RAPID-MCNC: 869 NG/ML DDU — HIGH
EOSINOPHIL # BLD AUTO: 0 K/UL — SIGNIFICANT CHANGE UP (ref 0–0.5)
EOSINOPHIL NFR BLD AUTO: 0 % — SIGNIFICANT CHANGE UP (ref 0–6)
ERYTHROCYTE [SEDIMENTATION RATE] IN BLOOD: 11 MM/HR — SIGNIFICANT CHANGE UP
FERRITIN SERPL-MCNC: 225 NG/ML — HIGH (ref 15–150)
GLUCOSE SERPL-MCNC: 91 MG/DL — SIGNIFICANT CHANGE UP (ref 70–99)
HCT VFR BLD CALC: 30.7 % — LOW (ref 34.5–45)
HGB BLD-MCNC: 10 G/DL — LOW (ref 11.5–15.5)
IMM GRANULOCYTES NFR BLD AUTO: 3.7 % — HIGH (ref 0–1.5)
LYMPHOCYTES # BLD AUTO: 2.63 K/UL — SIGNIFICANT CHANGE UP (ref 1–3.3)
LYMPHOCYTES # BLD AUTO: 33.8 % — SIGNIFICANT CHANGE UP (ref 13–44)
MAGNESIUM SERPL-MCNC: 2 MG/DL — SIGNIFICANT CHANGE UP (ref 1.6–2.6)
MCHC RBC-ENTMCNC: 30.4 PG — SIGNIFICANT CHANGE UP (ref 27–34)
MCHC RBC-ENTMCNC: 32.6 GM/DL — SIGNIFICANT CHANGE UP (ref 32–36)
MCV RBC AUTO: 93.3 FL — SIGNIFICANT CHANGE UP (ref 80–100)
MONOCYTES # BLD AUTO: 1.05 K/UL — HIGH (ref 0–0.9)
MONOCYTES NFR BLD AUTO: 13.5 % — SIGNIFICANT CHANGE UP (ref 2–14)
NEUTROPHILS # BLD AUTO: 3.77 K/UL — SIGNIFICANT CHANGE UP (ref 1.8–7.4)
NEUTROPHILS NFR BLD AUTO: 48.4 % — SIGNIFICANT CHANGE UP (ref 43–77)
NRBC # BLD: 0 /100 WBCS — SIGNIFICANT CHANGE UP (ref 0–0)
NT-PROBNP SERPL-SCNC: 919 PG/ML — HIGH (ref 0–300)
PHOSPHATE SERPL-MCNC: 5.2 MG/DL — HIGH (ref 2.5–4.5)
PLATELET # BLD AUTO: 423 K/UL — HIGH (ref 150–400)
POTASSIUM SERPL-MCNC: 3.7 MMOL/L — SIGNIFICANT CHANGE UP (ref 3.5–5.3)
POTASSIUM SERPL-SCNC: 3.7 MMOL/L — SIGNIFICANT CHANGE UP (ref 3.5–5.3)
PROT SERPL-MCNC: 4.8 G/DL — LOW (ref 6–8.3)
RBC # BLD: 3.29 M/UL — LOW (ref 3.8–5.2)
RBC # FLD: 14.7 % — HIGH (ref 10.3–14.5)
SODIUM SERPL-SCNC: 143 MMOL/L — SIGNIFICANT CHANGE UP (ref 135–145)
TROPONIN T SERPL-MCNC: <0.01 NG/ML — SIGNIFICANT CHANGE UP (ref 0–0.01)
WBC # BLD: 7.79 K/UL — SIGNIFICANT CHANGE UP (ref 3.8–10.5)
WBC # FLD AUTO: 7.79 K/UL — SIGNIFICANT CHANGE UP (ref 3.8–10.5)

## 2020-03-26 PROCEDURE — 99233 SBSQ HOSP IP/OBS HIGH 50: CPT

## 2020-03-26 PROCEDURE — 99233 SBSQ HOSP IP/OBS HIGH 50: CPT | Mod: GC

## 2020-03-26 RX ORDER — AZITHROMYCIN 500 MG/1
250 TABLET, FILM COATED ORAL ONCE
Refills: 0 | Status: COMPLETED | OUTPATIENT
Start: 2020-03-27 | End: 2020-03-27

## 2020-03-26 RX ADMIN — QUETIAPINE FUMARATE 12.5 MILLIGRAM(S): 200 TABLET, FILM COATED ORAL at 17:02

## 2020-03-26 RX ADMIN — FAMOTIDINE 20 MILLIGRAM(S): 10 INJECTION INTRAVENOUS at 05:49

## 2020-03-26 RX ADMIN — ENOXAPARIN SODIUM 40 MILLIGRAM(S): 100 INJECTION SUBCUTANEOUS at 21:26

## 2020-03-26 RX ADMIN — QUETIAPINE FUMARATE 12.5 MILLIGRAM(S): 200 TABLET, FILM COATED ORAL at 05:49

## 2020-03-26 RX ADMIN — FAMOTIDINE 20 MILLIGRAM(S): 10 INJECTION INTRAVENOUS at 17:02

## 2020-03-26 RX ADMIN — AZITHROMYCIN 250 MILLIGRAM(S): 500 TABLET, FILM COATED ORAL at 12:08

## 2020-03-26 NOTE — PROGRESS NOTE ADULT - PROBLEM SELECTOR PLAN 3
F: None  E: Replete PRN for K<4, Mg<2  N: Regular diet  DVT PPx: Lovenox  GI PPx: Pepcid  Code status: Full code  Dispo: COVID RMF

## 2020-03-26 NOTE — PROGRESS NOTE ADULT - SUBJECTIVE AND OBJECTIVE BOX
Transfer note from COVID-tele to [facility] course:  Pt is a 29F with PMH recurrent PNA who presented 3/18 with F/C, cough, and SOB x3d. Was seen at  3/16 for similar symptoms and was flu-A positive with CXR+ RUL consolidate. COVID swab was sent and pt was sent home with tamiflu, levaquin, and albuterol inhaler. Pt with persistent fevers to 103 and returned to ED for evaluation. On arrival, pt febrile 100.6, , BP 95/58, RR 20, and O2 sat 100% RA. Labs with WBC 8.29, 0.36 lymphocytes, 54.8% neutrophils. VBG with pH 7.29/37/19/18. CXR with diffuse pulmonary infiltrates. Pt given 1x levaquin 750mg, vancomycin 1g, zosyn 3.375g, and 1L NS. Admitted to MICU with c/f need for intubation in the setting of acute hypoxic respiratory distress 2/2 flu A vs. post-viral PNA vs. COVID infection. Intubated 3/19 for increasing oxygen demands and work of breathing. 3/20 COVID resulted positive and pt with s/s ARDS being treated with proning/de-proning. S/p 1x tocilizumab, 5d plaquenil, and Kaletra. Now on vit C and thiamine. Extubated 3/23 to NRB with mild tachypnea and increased oxygen requirements, stable overnight on venti mask and now deescalated to NC. Pt was stepped down to COVID-tele for continued monitoring, now stable for transfer to [facility].    OVERNIGHT EVENTS:  No acute events overnight.    SUBJECTIVE / INTERVAL HPI: Patient seen and examined at bedside.    VITAL SIGNS:  Vital Signs Last 24 Hrs  T(C): 36.7 (26 Mar 2020 12:30), Max: 37.3 (25 Mar 2020 21:31)  T(F): 98 (26 Mar 2020 12:30), Max: 99.1 (25 Mar 2020 21:31)  HR: 78 (26 Mar 2020 12:16) (52 - 78)  BP: 116/74 (26 Mar 2020 12:16) (116/74 - 138/70)  BP(mean): 91 (26 Mar 2020 12:16) (89 - 103)  RR: 18 (26 Mar 2020 12:16) (17 - 38)  SpO2: 99% (26 Mar 2020 12:16) (94% - 99%)    PHYSICAL EXAM:    General: Lying in bed, NAD, on 6L NC  HEENT: NC/AT, PERRL, anicteric sclera; MMM  Neck: supple  Cardiovascular: +S1/S2, RRR  Respiratory: Diminished breath sounds  Gastrointestinal: soft, NT/ND, +BS  Extremities: WWP, no edema or cyanosis  Vascular: 2+ radial, DP/PT pulses B/L  Neurological: AAOx3, no focal deficits    MEDICATIONS:  MEDICATIONS  (STANDING):  azithromycin   Tablet 250 milliGRAM(s) Oral daily  enoxaparin Injectable 40 milliGRAM(s) SubCutaneous every 24 hours  famotidine    Tablet 20 milliGRAM(s) Oral two times a day  influenza   Vaccine 0.5 milliLiter(s) IntraMuscular once  QUEtiapine 12.5 milliGRAM(s) Oral every 12 hours    MEDICATIONS  (PRN):  acetaminophen   Tablet .. 650 milliGRAM(s) Oral every 6 hours PRN Temp greater or equal to 38C (100.4F)      ALLERGIES:  Allergies    Ceclor (Unknown)    Intolerances        LABS:                        10.0   7.79  )-----------( 423      ( 26 Mar 2020 07:11 )             30.7     03-26    143  |  107  |  7   ----------------------------<  91  3.7   |  25  |  0.65    Ca    8.4      26 Mar 2020 07:11  Phos  5.2     03-26  Mg     2.0     03-26    TPro  4.8<L>  /  Alb  2.8<L>  /  TBili  0.4  /  DBili  x   /  AST  56<H>  /  ALT  73<H>  /  AlkPhos  48  03-26        CAPILLARY BLOOD GLUCOSE      POCT Blood Glucose.: 97 mg/dL (25 Mar 2020 17:27)      RADIOLOGY & ADDITIONAL TESTS: Reviewed.

## 2020-03-26 NOTE — PROGRESS NOTE ADULT - REASON FOR ADMISSION
Acute Hypoxic Respiratory Distress

## 2020-03-26 NOTE — PROGRESS NOTE ADULT - PROBLEM SELECTOR PLAN 1
Improving on current therapy. Satting well on RA. Pt presented with 3 days of SOB, cough, found to be COVID+. CXR with patchy opacities. Was intubated in MICU on 3/18, extubated on 3/23, now on NC. S/p 5-day course of hydroxychloroquine, 7 day course of zosyn, 1 dose tocilizumab, 5 days thiamine and vitamin c  - Continue azithromycin 250mg daily (last dose 3/27)  - c/w Pepcid 20mg BID  - Continue to trend COVID labs  - supplemental O2 as needed, wean as tolerated  - Maintain contact and droplet isolation precautions  - Tylenol for fever  - monitor respiratory status  - incentive spirometer  - s/p course of Plaquenil, vit c, thiamine, toci, and kaletra Improving on current therapy. Satting well on RA. Pt presented with 3 days of SOB, cough, found to be COVID+. CXR with patchy opacities. Was intubated in MICU on 3/18, extubated on 3/23, now on NC. S/p 5-day course of hydroxychloroquine, 7 day course of zosyn, 1 dose tocilizumab, 5 days thiamine and vitamin c  - Continue azithromycin 250mg daily (last dose 3/27)  - c/w Pepcid 20mg BID  - Continue to trend COVID labs  - supplemental O2 as needed  - Maintain contact and droplet isolation precautions  - Tylenol for fever  - monitor respiratory status  - incentive spirometer  - s/p course of Plaquenil, vit c, thiamine, toci, and kaletra  - PT

## 2020-03-26 NOTE — PROGRESS NOTE ADULT - ASSESSMENT
Pt is a 29F COVID positive with PMH recurrent PNA who presented 3/18 with F/C, cough, and SOB x3d. Was intubated in MICU with proning for acute hypoxic respiratory failure and now extubated. Transferred to [facility] for continued monitoring.

## 2020-03-26 NOTE — PROGRESS NOTE ADULT - SUBJECTIVE AND OBJECTIVE BOX
[facility]  PGY-3 TRANSFER ACCEPTANCE NOTE    Hospital course:  Pt is a 29F with PMH recurrent PNA who presented 3/18 with F/C, cough, and SOB x3d. Was seen at  3/16 for similar symptoms and was flu-A positive with CXR+ RUL consolidate. COVID swab was sent and pt was sent home with tamiflu, levaquin, and albuterol inhaler. Pt with persistent fevers to 103 and returned to ED for evaluation. On arrival, pt febrile 100.6, , BP 95/58, RR 20, and O2 sat 100% RA. Labs with WBC 8.29, 0.36 lymphocytes, 54.8% neutrophils. VBG with pH 7.29/37/19/18. CXR with diffuse pulmonary infiltrates. Pt given 1x levaquin 750mg, vancomycin 1g, zosyn 3.375g, and 1L NS. Admitted to MICU with c/f need for intubation in the setting of acute hypoxic respiratory distress 2/2 flu A vs. post-viral PNA vs. COVID infection. Intubated 3/19 for increasing oxygen demands and work of breathing. 3/20 COVID resulted positive and pt with s/s ARDS being treated with proning/de-proning. S/p 1x tocilizumab, 5d plaquenil, and Kaletra. Now on vit C and thiamine. Extubated 3/23 to NRB with mild tachypnea and increased oxygen requirements, stable overnight on venti mask and now deescalated to NC. Pt was stepped down to COVID-tele for continued monitoring, now stable for transfer to COVID-regional.    SUBJECTIVE / INTERVAL HPI: Patient seen and examined at bedside. NAD. No respiratory distress. Sitting up in chair comfortably, Pt denies acute SOB. Currently off NC and saturating 98% on RA though stating she intermittently feels chest tightness. Endorses cough with some sputum production. Denies fevers/chills, CP, palpitations, abdominal pain, n/v/d/c, urinary symptoms. No HA. Reports feeling improved since admission. Endorses h/o URI and PNA without necessarily underlying diagnosis.     ROS: negative unless otherwise stated above.    VITAL SIGNS:  Vital Signs Last 24 Hrs  T(C): 36.8 (26 Mar 2020 16:00), Max: 37.3 (25 Mar 2020 21:31)  T(F): 98.2 (26 Mar 2020 16:00), Max: 99.1 (25 Mar 2020 21:31)  HR: 72 (26 Mar 2020 16:00) (52 - 78)  BP: 132/85 (26 Mar 2020 16:00) (116/74 - 138/70)  BP(mean): 105 (26 Mar 2020 16:00) (89 - 105)  RR: 18 (26 Mar 2020 16:00) (17 - 18)  SpO2: 99% (26 Mar 2020 16:00) (94% - 99%)    PHYSICAL EXAM:    General: WDWN, NAD, no respiratory distress, sitting up in chair comfortably  HEENT: NC/AT; PERRL, anicteric sclera; MMM  Neck: supple  Cardiovascular: +S1/S2; RRR  Respiratory: CTA B/L; no wheezing or rhonchi, some fine crackles R base, no labored breathing or retractions on room air  Gastrointestinal: soft, NT/ND; +BSx4  Extremities: WWP; no edema, clubbing or cyanosis  Vascular: 2+ radial, DP/PT pulses B/L  Neurological: AAOx3; no focal deficits    MEDICATIONS:  MEDICATIONS  (STANDING):  enoxaparin Injectable 40 milliGRAM(s) SubCutaneous every 24 hours  famotidine    Tablet 20 milliGRAM(s) Oral two times a day  influenza   Vaccine 0.5 milliLiter(s) IntraMuscular once  QUEtiapine 12.5 milliGRAM(s) Oral every 12 hours    MEDICATIONS  (PRN):  acetaminophen   Tablet .. 650 milliGRAM(s) Oral every 6 hours PRN Temp greater or equal to 38C (100.4F)      ALLERGIES:  Allergies    Ceclor (Unknown)    Intolerances        LABS:                        10.0   7.79  )-----------( 423      ( 26 Mar 2020 07:11 )             30.7     03-26    143  |  107  |  7   ----------------------------<  91  3.7   |  25  |  0.65    Ca    8.4      26 Mar 2020 07:11  Phos  5.2     03-26  Mg     2.0     03-26    TPro  4.8<L>  /  Alb  2.8<L>  /  TBili  0.4  /  DBili  x   /  AST  56<H>  /  ALT  73<H>  /  AlkPhos  48  03-26        CAPILLARY BLOOD GLUCOSE      POCT Blood Glucose.: 97 mg/dL (25 Mar 2020 17:27)      RADIOLOGY & ADDITIONAL TESTS: Reviewed.

## 2020-03-26 NOTE — PROGRESS NOTE ADULT - PROBLEM SELECTOR PLAN 3
F: None  E: Replete PRN for K<4, Mg<2  N: Mechanical soft, advance as tolerated  DVT PPx: Lovenox  GI PPx: Pepcid  Code status: Full code  Dispo: COVID-tele

## 2020-03-26 NOTE — PROGRESS NOTE ADULT - ASSESSMENT
Pt is a 29F COVID positive with PMH recurrent PNA who presented 3/18 with F/C, cough, and SOB x3d. Was intubated in MICU with proning for acute hypoxic respiratory failure and now extubated. Transferred to [facility] for continued monitoring.

## 2020-03-27 ENCOUNTER — TRANSCRIPTION ENCOUNTER (OUTPATIENT)
Age: 30
End: 2020-03-27

## 2020-03-27 VITALS
SYSTOLIC BLOOD PRESSURE: 122 MMHG | TEMPERATURE: 99 F | HEART RATE: 99 BPM | RESPIRATION RATE: 16 BRPM | DIASTOLIC BLOOD PRESSURE: 84 MMHG | OXYGEN SATURATION: 98 %

## 2020-03-27 LAB
ALBUMIN SERPL ELPH-MCNC: 2.8 G/DL — LOW (ref 3.3–5)
ALP SERPL-CCNC: 44 U/L — SIGNIFICANT CHANGE UP (ref 40–120)
ALT FLD-CCNC: 55 U/L — HIGH (ref 10–45)
ANION GAP SERPL CALC-SCNC: 10 MMOL/L — SIGNIFICANT CHANGE UP (ref 5–17)
AST SERPL-CCNC: 40 U/L — SIGNIFICANT CHANGE UP (ref 10–40)
BASOPHILS # BLD AUTO: 0.04 K/UL — SIGNIFICANT CHANGE UP (ref 0–0.2)
BASOPHILS NFR BLD AUTO: 0.6 % — SIGNIFICANT CHANGE UP (ref 0–2)
BILIRUB SERPL-MCNC: 0.4 MG/DL — SIGNIFICANT CHANGE UP (ref 0.2–1.2)
BUN SERPL-MCNC: 11 MG/DL — SIGNIFICANT CHANGE UP (ref 7–23)
CALCIUM SERPL-MCNC: 8.5 MG/DL — SIGNIFICANT CHANGE UP (ref 8.4–10.5)
CHLORIDE SERPL-SCNC: 109 MMOL/L — HIGH (ref 96–108)
CO2 SERPL-SCNC: 25 MMOL/L — SIGNIFICANT CHANGE UP (ref 22–31)
CREAT SERPL-MCNC: 0.78 MG/DL — SIGNIFICANT CHANGE UP (ref 0.5–1.3)
CRP SERPL-MCNC: 0.46 MG/DL — HIGH (ref 0–0.4)
D DIMER BLD IA.RAPID-MCNC: 917 NG/ML DDU — HIGH
EOSINOPHIL # BLD AUTO: 0 K/UL — SIGNIFICANT CHANGE UP (ref 0–0.5)
EOSINOPHIL NFR BLD AUTO: 0 % — SIGNIFICANT CHANGE UP (ref 0–6)
FERRITIN SERPL-MCNC: 193 NG/ML — HIGH (ref 15–150)
GLUCOSE SERPL-MCNC: 91 MG/DL — SIGNIFICANT CHANGE UP (ref 70–99)
HCT VFR BLD CALC: 34.3 % — LOW (ref 34.5–45)
HGB BLD-MCNC: 11 G/DL — LOW (ref 11.5–15.5)
IMM GRANULOCYTES NFR BLD AUTO: 1.8 % — HIGH (ref 0–1.5)
LDH SERPL L TO P-CCNC: 440 U/L — HIGH (ref 50–242)
LYMPHOCYTES # BLD AUTO: 2.69 K/UL — SIGNIFICANT CHANGE UP (ref 1–3.3)
LYMPHOCYTES # BLD AUTO: 40.5 % — SIGNIFICANT CHANGE UP (ref 13–44)
MAGNESIUM SERPL-MCNC: 2.3 MG/DL — SIGNIFICANT CHANGE UP (ref 1.6–2.6)
MCHC RBC-ENTMCNC: 30.7 PG — SIGNIFICANT CHANGE UP (ref 27–34)
MCHC RBC-ENTMCNC: 32.1 GM/DL — SIGNIFICANT CHANGE UP (ref 32–36)
MCV RBC AUTO: 95.8 FL — SIGNIFICANT CHANGE UP (ref 80–100)
MONOCYTES # BLD AUTO: 1.04 K/UL — HIGH (ref 0–0.9)
MONOCYTES NFR BLD AUTO: 15.6 % — HIGH (ref 2–14)
NEUTROPHILS # BLD AUTO: 2.76 K/UL — SIGNIFICANT CHANGE UP (ref 1.8–7.4)
NEUTROPHILS NFR BLD AUTO: 41.5 % — LOW (ref 43–77)
NRBC # BLD: 0 /100 WBCS — SIGNIFICANT CHANGE UP (ref 0–0)
PHOSPHATE SERPL-MCNC: 6.7 MG/DL — HIGH (ref 2.5–4.5)
PLATELET # BLD AUTO: 499 K/UL — HIGH (ref 150–400)
POTASSIUM SERPL-MCNC: 3.5 MMOL/L — SIGNIFICANT CHANGE UP (ref 3.5–5.3)
POTASSIUM SERPL-SCNC: 3.5 MMOL/L — SIGNIFICANT CHANGE UP (ref 3.5–5.3)
PROT SERPL-MCNC: 5.3 G/DL — LOW (ref 6–8.3)
RBC # BLD: 3.58 M/UL — LOW (ref 3.8–5.2)
RBC # FLD: 14.7 % — HIGH (ref 10.3–14.5)
SODIUM SERPL-SCNC: 144 MMOL/L — SIGNIFICANT CHANGE UP (ref 135–145)
WBC # BLD: 6.65 K/UL — SIGNIFICANT CHANGE UP (ref 3.8–10.5)
WBC # FLD AUTO: 6.65 K/UL — SIGNIFICANT CHANGE UP (ref 3.8–10.5)

## 2020-03-27 PROCEDURE — 85610 PROTHROMBIN TIME: CPT

## 2020-03-27 PROCEDURE — 99285 EMERGENCY DEPT VISIT HI MDM: CPT | Mod: 25

## 2020-03-27 PROCEDURE — 80053 COMPREHEN METABOLIC PANEL: CPT

## 2020-03-27 PROCEDURE — 82955 ASSAY OF G6PD ENZYME: CPT

## 2020-03-27 PROCEDURE — 83880 ASSAY OF NATRIURETIC PEPTIDE: CPT

## 2020-03-27 PROCEDURE — 85730 THROMBOPLASTIN TIME PARTIAL: CPT

## 2020-03-27 PROCEDURE — 84132 ASSAY OF SERUM POTASSIUM: CPT

## 2020-03-27 PROCEDURE — 82550 ASSAY OF CK (CPK): CPT

## 2020-03-27 PROCEDURE — 87633 RESP VIRUS 12-25 TARGETS: CPT

## 2020-03-27 PROCEDURE — 84484 ASSAY OF TROPONIN QUANT: CPT

## 2020-03-27 PROCEDURE — 87040 BLOOD CULTURE FOR BACTERIA: CPT

## 2020-03-27 PROCEDURE — 85025 COMPLETE CBC W/AUTO DIFF WBC: CPT

## 2020-03-27 PROCEDURE — 87641 MR-STAPH DNA AMP PROBE: CPT

## 2020-03-27 PROCEDURE — 83615 LACTATE (LD) (LDH) ENZYME: CPT

## 2020-03-27 PROCEDURE — 83520 IMMUNOASSAY QUANT NOS NONAB: CPT

## 2020-03-27 PROCEDURE — 85027 COMPLETE CBC AUTOMATED: CPT

## 2020-03-27 PROCEDURE — 82962 GLUCOSE BLOOD TEST: CPT

## 2020-03-27 PROCEDURE — 84702 CHORIONIC GONADOTROPIN TEST: CPT

## 2020-03-27 PROCEDURE — 36415 COLL VENOUS BLD VENIPUNCTURE: CPT

## 2020-03-27 PROCEDURE — 84478 ASSAY OF TRIGLYCERIDES: CPT

## 2020-03-27 PROCEDURE — 94640 AIRWAY INHALATION TREATMENT: CPT

## 2020-03-27 PROCEDURE — 85379 FIBRIN DEGRADATION QUANT: CPT

## 2020-03-27 PROCEDURE — 87635 SARS-COV-2 COVID-19 AMP PRB: CPT

## 2020-03-27 PROCEDURE — 85652 RBC SED RATE AUTOMATED: CPT

## 2020-03-27 PROCEDURE — 87389 HIV-1 AG W/HIV-1&-2 AB AG IA: CPT

## 2020-03-27 PROCEDURE — 84295 ASSAY OF SERUM SODIUM: CPT

## 2020-03-27 PROCEDURE — 82330 ASSAY OF CALCIUM: CPT

## 2020-03-27 PROCEDURE — 96374 THER/PROPH/DIAG INJ IV PUSH: CPT

## 2020-03-27 PROCEDURE — 84100 ASSAY OF PHOSPHORUS: CPT

## 2020-03-27 PROCEDURE — 84145 PROCALCITONIN (PCT): CPT

## 2020-03-27 PROCEDURE — 83036 HEMOGLOBIN GLYCOSYLATED A1C: CPT

## 2020-03-27 PROCEDURE — 83605 ASSAY OF LACTIC ACID: CPT

## 2020-03-27 PROCEDURE — 86359 T CELLS TOTAL COUNT: CPT

## 2020-03-27 PROCEDURE — 82728 ASSAY OF FERRITIN: CPT

## 2020-03-27 PROCEDURE — 87798 DETECT AGENT NOS DNA AMP: CPT

## 2020-03-27 PROCEDURE — 94003 VENT MGMT INPAT SUBQ DAY: CPT

## 2020-03-27 PROCEDURE — 82803 BLOOD GASES ANY COMBINATION: CPT

## 2020-03-27 PROCEDURE — 82150 ASSAY OF AMYLASE: CPT

## 2020-03-27 PROCEDURE — 87486 CHLMYD PNEUM DNA AMP PROBE: CPT

## 2020-03-27 PROCEDURE — 99239 HOSP IP/OBS DSCHRG MGMT >30: CPT | Mod: GC

## 2020-03-27 PROCEDURE — 83735 ASSAY OF MAGNESIUM: CPT

## 2020-03-27 PROCEDURE — 71045 X-RAY EXAM CHEST 1 VIEW: CPT

## 2020-03-27 PROCEDURE — 86140 C-REACTIVE PROTEIN: CPT

## 2020-03-27 PROCEDURE — 87581 M.PNEUMON DNA AMP PROBE: CPT

## 2020-03-27 PROCEDURE — 83690 ASSAY OF LIPASE: CPT

## 2020-03-27 RX ORDER — ACETAMINOPHEN WITH CODEINE 300MG-30MG
0 TABLET ORAL
Qty: 0 | Refills: 0 | DISCHARGE

## 2020-03-27 RX ADMIN — FAMOTIDINE 20 MILLIGRAM(S): 10 INJECTION INTRAVENOUS at 05:25

## 2020-03-27 RX ADMIN — AZITHROMYCIN 250 MILLIGRAM(S): 500 TABLET, FILM COATED ORAL at 11:34

## 2020-03-27 RX ADMIN — QUETIAPINE FUMARATE 12.5 MILLIGRAM(S): 200 TABLET, FILM COATED ORAL at 05:25

## 2020-03-27 NOTE — DISCHARGE NOTE NURSING/CASE MANAGEMENT/SOCIAL WORK - PATIENT PORTAL LINK FT
You can access the FollowMyHealth Patient Portal offered by [facility] by registering at the following website: [email or web address]. By joining [facility]’s FollowMyHealth portal, you will also be able to view your health information using other applications (apps) compatible with our system.

## 2020-03-27 NOTE — DISCHARGE NOTE PROVIDER - CARE PROVIDER_API CALL
MD Julia, [address] [address]  Phone: [phone]  Fax: (   )    -  Established Patient  Follow Up Time: 2 weeks

## 2020-03-27 NOTE — DISCHARGE NOTE PROVIDER - NSDCCPCAREPLAN_GEN_ALL_CORE_FT
PRINCIPAL DISCHARGE DIAGNOSIS  Diagnosis: ARDS (adult respiratory distress syndrome)  Assessment and Plan of Treatment: You were found to be in acute respiratory distress syndrome which is an hyperactive immune response to an infectious insult in your lungs. In this case, it was the COVID-19 virus. This test resulted from a nasal and oral (mouth) swab that was done earlier in your admission to [facility]. Your symptoms improved dramatically during your hospital stay. You may take tylenol if you experience any fevers and Robitussin for cough. If you start experiencing extreme shortness of breath, difficulty breathing resulting in the inability to even walk, please visit an urgent care. Please maintain a 2 week (14 day) quarantine in your apartment until a health care agent calls you with the test results. The department of health will followup with you via phone, please do not go to your PCP while in self quarantine. Wear a mask at all times should you have to be outdoors briefly - and avoid crowds, public spaces, and mass transit at all times during this quarantine. Continue to wash your hands frequently. Please see the supplemented written instructions for further use.  *Please have your primary care physician obtain the following blood work on your visit: CBC w/differential, CMP, ferritin, ESR, CRP, d-dimer PRINCIPAL DISCHARGE DIAGNOSIS  Diagnosis: ARDS (adult respiratory distress syndrome)  Assessment and Plan of Treatment: You were found to be in acute respiratory distress syndrome which is an hyperactive immune response to an infectious insult in your lungs. In this case, it was the COVID-19 virus. This test resulted from a nasal and oral (mouth) swab that was done earlier in your admission to [facility]. Your symptoms improved dramatically during your hospital stay. You may take tylenol if you experience any fevers and Robitussin for cough. If you start experiencing extreme shortness of breath, difficulty breathing resulting in the inability to even walk, please visit an urgent care. Please maintain a 2 week (14 day) quarantine in your apartment until a health care agent calls you with the test results. The department of health will followup with you via phone, please do not go to your PCP while in self quarantine. Wear a mask at all times should you have to be outdoors briefly - and avoid crowds, public spaces, and mass transit at all times during this quarantine. Continue to wash your hands frequently. Please see the supplemented written instructions for further use.  *Please have your primary care physician obtain the following blood work on your visit: CBC w/differential, CMP, ferritin, ESR, CRP, d-dimer

## 2020-03-27 NOTE — DISCHARGE NOTE PROVIDER - PROVIDER TOKENS
FREE:[LAST:[MD Julia],FIRST:[Kian],PHONE:[phone]],FAX:[(   )    -],ADDRESS:[[address] [place] [address]],FOLLOWUP:[2 weeks],ESTABLISHEDPATIENT:[T]]

## 2020-03-27 NOTE — DISCHARGE NOTE PROVIDER - HOSPITAL COURSE
#Discharge: do not delete        Patient is __ yo M/F with past medical history of _____    Presented with _____, found to have _____    Problem List/Main Diagnoses (system-based):     Inpatient treatment course:     New medications:     Labs to be followed outpatient:     Exam to be followed outpatient: #Discharge: do not delete        Pt is a 29F COVID positive with PMH recurrent PNA who presented 3/18 with F/C, cough, and SOB x3d. Was intubated in MICU with proning for acute hypoxic respiratory failure and now extubated. Transferred to [facility] for continued monitoring.        Problem List/Main Diagnoses (system-based):         #SARS-associated coronavirus infection- Pt presented with 3 days of SOB, cough, found to be COVID+. CXR with patchy opacities. Was intubated in MICU on 3/18, extubated on 3/23, S/p 5-day course of hydroxychloroquine, 7 day course of zosyn, 1 dose tocilizumab, 5 days thiamine and vitamin c    - On d/c: Lymphopenia resolved, d-dimer elevated 900s, CRP and ferritin mildly elevated    - Continue azithromycin 250mg daily (last dose 3/27)    - weaned off of NC, saturating well    - Tylenol for fever    - s/p full course Plaquenil, azithromycin, vit c, thiamine, Actemra (tocilizumab), and Kaletra    - Pt will isolate/quarantine for 2 weeks in her apt and will f/u w/PCP in 2w to trend labs         #Influenza A - see plan above     - s/p tamiflu 5d        Inpatient Treatment:    Pt is a 29F with PMH recurrent PNA who presented 3/18 with F/C, cough, and SOB x3d. Was seen at  3/16 for similar symptoms and was flu-A positive with CXR+ RUL consolidate. COVID swab was sent and pt was sent home with tamiflu, levaquin, and albuterol inhaler. Pt with persistent fevers to 103 and returned to ED for evaluation. On arrival, pt febrile 100.6, , BP 95/58, RR 20, and O2 sat 100% RA. Labs with WBC 8.29, 0.36 lymphocytes, 54.8% neutrophils. VBG with pH 7.29/37/19/18. CXR with diffuse pulmonary infiltrates. Pt given 1x levaquin 750mg, vancomycin 1g, zosyn 3.375g, and 1L NS. Admitted to MICU with c/f need for intubation in the setting of acute hypoxic respiratory distress 2/2 flu A vs. post-viral PNA vs. COVID infection. Intubated 3/19 for increasing oxygen demands and work of breathing. 3/20 COVID resulted positive and pt with s/s ARDS being treated with proning/de-proning. S/p 1x tocilizumab, 5d plaquenil, and Kaletra. Now on vit C and thiamine. Extubated 3/23 to NRB with mild tachypnea and increased oxygen requirements, stable overnight on venti mask and now deescalated to NC now on RA.             New medications: none    Labs to be followed outpatient: CBC w/diff, CMP, ferritin, CRP, ESR, d-dimer     Exam to be followed outpatient: none #Discharge: do not delete        Pt is a 29F COVID positive with PMH recurrent PNA who presented 3/18 with F/C, cough, and SOB x3d. Was intubated in MICU with proning for acute hypoxic respiratory failure and now extubated. Transferred to [facility] for continued monitoring.        Problem List/Main Diagnoses (system-based):         #SARS-associated coronavirus infection- Pt presented with 3 days of SOB, cough, found to be COVID+. CXR with patchy opacities. Was intubated in MICU on 3/18, extubated on 3/23, S/p 5-day course of hydroxychloroquine, 7 day course of zosyn, 1 dose tocilizumab, 5 days thiamine and vitamin c    - On d/c: Lymphopenia resolved, d-dimer elevated 900s, CRP and ferritin mildly elevated    - weaned off of NC, saturating well    - Tylenol for fever    - s/p full course Plaquenil, azithromycin, vit c, thiamine, Actemra (tocilizumab), and Kaletra    - Pt will isolate/quarantine for 2 weeks in her apt and will f/u w/PCP in 2w to trend labs         #Influenza A - see plan above     - s/p tamiflu 5d        Inpatient Treatment:    Pt is a 29F with PMH recurrent PNA who presented 3/18 with F/C, cough, and SOB x3d. Was seen at  3/16 for similar symptoms and was flu-A positive with CXR+ RUL consolidate. COVID swab was sent and pt was sent home with tamiflu, levaquin, and albuterol inhaler. Pt with persistent fevers to 103 and returned to ED for evaluation. On arrival, pt febrile 100.6, , BP 95/58, RR 20, and O2 sat 100% RA. Labs with WBC 8.29, 0.36 lymphocytes, 54.8% neutrophils. VBG with pH 7.29/37/19/18. CXR with diffuse pulmonary infiltrates. Pt given 1x levaquin 750mg, vancomycin 1g, zosyn 3.375g, and 1L NS. Admitted to MICU with c/f need for intubation in the setting of acute hypoxic respiratory distress 2/2 flu A vs. post-viral PNA vs. COVID infection. Intubated 3/19 for increasing oxygen demands and work of breathing. 3/20 COVID resulted positive and pt with s/s ARDS being treated with proning/de-proning. S/p 1x tocilizumab, 5d plaquenil, and Kaletra. Now on vit C and thiamine. Extubated 3/23 to NRB with mild tachypnea and increased oxygen requirements, stable overnight on venti mask and now deescalated to NC now on RA.             New medications: none    Labs to be followed outpatient: CBC w/diff, CMP, ferritin, CRP, ESR, d-dimer     Exam to be followed outpatient: none

## 2020-05-29 NOTE — PATIENT PROFILE ADULT - HAS THE PATIENT RECEIVED THE INFLUENZA VACCINE THIS SEASON?
PATIENT INSTRUCTIONS      Treatment:  Ice: Apply ice for 20 minutes 2-3 times a day. No barrier between the ice and skin is needed when using cubes or frozen peas. If you are using a chemical ice pack please place a barrier between the ice pack and your skin.        Resume home exercises- handout given      After Injection Instruction  You have been given an injection (shot) at the [facility] Orthopaedics department.  Injections are given into joints, tendons, or soft tissue for the treatment of pain and inflammation.  The medicine is a corticosteroid, but is often called a steroid or cortisone shot.  The steroid used may be Dexamethasone or Depo-Medrol.  A fast acting pain killer such as Lidocaine may be injected with the steroid to dull the pain for a few hours.  A long acting anesthetic, such as Marcaine, may also be injected with the steroid.  It may last up to 30 hours or wear off as soon as 6 hours.      · The steroid begins to work in 24 to 48 hours and may take 2 weeks to reach full effect.    · Take all of your regular medications, including pain medicine (unless instructed otherwise).    · Some increased pain may occur in the first 24 hours after the injection.  You may apply a cold pack or ice to the injected area for 15 to 20 minutes every 1 to 2 hours for 24 hours to lessen the pain.    · For people with diabetes, your blood sugar may be increased for 1 to 2 days. If you have any questions regarding your blood sugar, please contact your primary care physician.      Call your Orthopaedic physician if you develop any of the following symptoms:  · Fever  · Increased redness of injection site  · If not better in 2 weeks        Please note: 24 hour notice for cancellation of appointment is required.    You may receive a survey in the mail, or via the e-mail address that you have provided.  We would appreciate if you could fill out the survey and provide us with any feedback on your experience  regarding your visit today. Thank you for allowing us to provide you with your health care needs.     Do not hesitate to call if you are experiencing severe pain, worsening or change in your pain, have symptoms of infection (fever, warmth, redness, increased drainage), or have any other problem that concerns you ~ [phone] (or [phone] after hours).    Please remember when requesting refills on pain medication that the request should be made by Thursday at the latest. [facility] is open Monday-Friday, 8am-5pm, and closed on the weekends.  No narcotic refills will be filled after hours.    Additional Educational Resources:  For additional resources regarding your symptoms, diagnosis, or further health information, please visit the Health Resources section on [email or web address] or the Online Health Resources section in [facility].         no...

## 2020-07-13 PROBLEM — Z00.00 ENCOUNTER FOR PREVENTIVE HEALTH EXAMINATION: Status: ACTIVE | Noted: 2020-07-13

## 2020-07-16 ENCOUNTER — APPOINTMENT (OUTPATIENT)
Dept: NEUROLOGY | Facility: CLINIC | Age: 30
End: 2020-07-16
Payer: COMMERCIAL

## 2020-07-16 PROCEDURE — 99202 OFFICE O/P NEW SF 15 MIN: CPT | Mod: 95

## 2020-07-20 RX ORDER — OLANZAPINE 5 MG/1
5 TABLET, FILM COATED ORAL AT BEDTIME
Qty: 2 | Refills: 0 | Status: ACTIVE | COMMUNITY
Start: 2020-07-20 | End: 1900-01-01

## 2020-07-24 ENCOUNTER — TRANSCRIPTION ENCOUNTER (OUTPATIENT)
Age: 30
End: 2020-07-24

## 2020-07-24 ENCOUNTER — APPOINTMENT (OUTPATIENT)
Dept: PHYSICAL MEDICINE AND REHAB | Facility: CLINIC | Age: 30
End: 2020-07-24
Payer: COMMERCIAL

## 2020-07-24 VITALS — BODY MASS INDEX: 27.32 KG/M2 | RESPIRATION RATE: 18 BRPM | WEIGHT: 164 LBS | HEIGHT: 65 IN

## 2020-07-24 DIAGNOSIS — Z80.8 FAMILY HISTORY OF MALIGNANT NEOPLASM OF OTHER ORGANS OR SYSTEMS: ICD-10-CM

## 2020-07-24 DIAGNOSIS — U07.1 COVID-19: ICD-10-CM

## 2020-07-24 DIAGNOSIS — Z82.61 FAMILY HISTORY OF ARTHRITIS: ICD-10-CM

## 2020-07-24 DIAGNOSIS — Z80.3 FAMILY HISTORY OF MALIGNANT NEOPLASM OF BREAST: ICD-10-CM

## 2020-07-24 DIAGNOSIS — Z86.69 PERSONAL HISTORY OF OTHER DISEASES OF THE NERVOUS SYSTEM AND SENSE ORGANS: ICD-10-CM

## 2020-07-24 PROCEDURE — 99204 OFFICE O/P NEW MOD 45 MIN: CPT | Mod: 95

## 2020-07-24 RX ORDER — MINOCYCLINE HYDROCHLORIDE 135 MG/1
135 TABLET, FILM COATED, EXTENDED RELEASE ORAL
Refills: 0 | Status: ACTIVE | COMMUNITY

## 2020-07-24 RX ORDER — PREDNISONE 10 MG
TABLET ORAL
Refills: 0 | Status: ACTIVE | COMMUNITY

## 2020-07-24 RX ORDER — LEVONORGESTREL AND ETHINYL ESTRADIOL 0.1-0.02MG
0.1-2 KIT ORAL
Refills: 0 | Status: ACTIVE | COMMUNITY

## 2020-07-24 RX ORDER — METFORMIN HYDROCHLORIDE 625 MG/1
TABLET ORAL
Refills: 0 | Status: ACTIVE | COMMUNITY

## 2020-07-24 RX ORDER — PHENTERMINE AND TOPIRAMATE 11.25; 69 MG/1; MG/1
11.25-69 CAPSULE, EXTENDED RELEASE ORAL
Refills: 0 | Status: ACTIVE | COMMUNITY

## 2020-07-24 NOTE — PHYSICAL EXAM
[FreeTextEntry1] : Gen: NAD, A&Ox3\par HEENT: skin lesion present top of head\par Pulm: no respiratory distress, breathing comfortably\par Extremities: moving all extremities spontaneously, skin lesions\par Neuro: Motor: able to walk on heels and toes, full strength bilateral upper extremities \par            Balance: Tandem gait intact\par  [de-identified] : no nose bleed now -no peistaxis  [Normal] : The appearance of the neck was normal, no neck mass was observed, the thyroid was not enlarged and there were no palpable thyroid nodules [de-identified] : pustules over scalp and torso  [de-identified] : gait fine heel toe and tandem walking good  [de-identified] : RR 16 no wheeze not SOB able to walk and talk  [de-identified] : no numbness sensation intact  [de-identified] : anxious

## 2020-07-24 NOTE — ASSESSMENT
[FreeTextEntry1] : Pt is a 30 y/o F with hx of severe respiratory COVID in March 2020 who is presenting for evaluation for EMG for neuropathy, pt is having itchiness and sensation of "bugs crawling."-suggests a small fiber neuropathy \par \par Recommend:\par Pt is undergoing hematologic and dermatologic workup of her lesions and pending blood-work. Recommend continuing those workups and will hold off on immediate EMG at this time, but will be available for patient.\par we discussed a skin biopsy which is relevant for SF PN oscar as she is under derm care \par she will think on our discussion \par She is still traumatized by her recent covid 19  and hospitalization

## 2020-07-24 NOTE — REASON FOR VISIT
[Home] : at home, [unfilled] , at the time of the visit. [Mother] : mother [Medical Office: ([facility])___] : at the medical office located in  [Verbal consent obtained from patient] : the patient, [unfilled] [Initial Evaluation] : an initial evaluation [FreeTextEntry1] : referred by Dr. Bonner

## 2020-07-24 NOTE — HISTORY OF PRESENT ILLNESS
[FreeTextEntry1] : Pt is a 28 y/o F with hx of severe respiratory COVID in March 2020 who is presenting for evaluation for EMG. Pt reports a history of multiple skin lesions since December that have travelled from her legs to her back, arms, and the top of her head. She was originally taking Clindamycin and prednisone, but after she was taken off of them by her hematologist the spread occurred. Pt reports a "bug crawling" sensation and she is very itching all over. She is being worked up by hematology and dermatology, and she has also had 2 week history of nosebleeds. Pt denies any numbness or tingling, is not having weakness, and is has not had any falls or difficulty with her balance. Pt has seen Dr. Bonner of neurology who referred her for neuropathy evaluation.

## 2020-07-24 NOTE — REVIEW OF SYSTEMS
[Patient Intake Form Reviewed] : Patient intake form was reviewed [Negative] : Psychiatric [Fever] : no fever [Chills] : no chills [Fatigue] : no fatigue [Shortness Of Breath] : no shortness of breath [Wheezing] : no wheezing [Cough] : no cough [Joint Pain] : no joint pain [Joint Stiffness] : no joint stiffness [Muscle Pain] : no muscle pain [Dizziness] : no dizziness [Muscle Weakness] : no muscle weakness [Headache] : no headaches [Difficulty Walking] : no difficulty walking [Fainting] : no fainting [FreeTextEntry6] : + COVID infection in March [de-identified] : +multiple skin lesions [de-identified] : +itchy feeling around her skin lesions [de-identified] : + 2 weeks of nosebleeds

## 2020-08-13 ENCOUNTER — TRANSCRIPTION ENCOUNTER (OUTPATIENT)
Age: 30
End: 2020-08-13

## 2020-08-17 ENCOUNTER — APPOINTMENT (OUTPATIENT)
Dept: NEUROLOGY | Facility: CLINIC | Age: 30
End: 2020-08-17
Payer: COMMERCIAL

## 2020-08-19 ENCOUNTER — APPOINTMENT (OUTPATIENT)
Dept: NEUROLOGY | Facility: CLINIC | Age: 30
End: 2020-08-19
Payer: COMMERCIAL

## 2020-08-19 DIAGNOSIS — F41.9 ANXIETY DISORDER, UNSPECIFIED: ICD-10-CM

## 2020-08-19 PROCEDURE — 99215 OFFICE O/P EST HI 40 MIN: CPT | Mod: 95

## 2020-08-19 NOTE — REASON FOR VISIT
[Other Location: e.g. Home (Enter Location, City,State)___] : at [unfilled] [Home] : at home, [unfilled] , at the time of the visit. [Follow-Up: _____] : a [unfilled] follow-up visit

## 2020-08-19 NOTE — PHYSICAL EXAM
[General Appearance - Well Nourished] : well nourished [General Appearance - Alert] : alert [General Appearance - In No Acute Distress] : in no acute distress [FreeTextEntry1] : Appears    to  be     anxious [Cranial Nerves Oculomotor (III)] : extraocular motion intact [Cranial Nerves Facial (VII)] : face symmetrical [Cranial Nerves Optic (II)] : visual acuity intact bilaterally,  visual fields full to confrontation, pupils equal round and reactive to light [FreeTextEntry7] : pt    reports    itching    of    the    skin   at  the    site    of the    lesions [Cranial Nerves Hypoglossal (XII)] : there was no tongue deviation with protrusion [Involuntary Movements] : no involuntary movements were seen

## 2020-08-19 NOTE — HISTORY OF PRESENT ILLNESS
[FreeTextEntry1] : The    patient     was  previously    seen    by   Dr. Bonner  for    "post-COVID"   issues. The  patient    sttaes  that      her    major    concern   is     skin  rash/lesions,  which  are     extremely   itchy   and   associated     with purulent   discharge.   Pt  states  that    she    was  seen    by     a    few   Dermatologists,  was   given   topical  treatments,  but   these  appear   to be   not   effective.     She     was   seen   by    Hematologist   and   Rheumatologist.   Reportedly     the   lesions    were     thought     to  be     related    to  abnormal   autoimmune     reaction     due   to  COVID.    The    records  are   not  available.     She   also reports      random  nose  bleeds,   diffuse    muscle    pains  and     joint    pain.  She    is   requesting  Rx     for    Bactrim,    which    reportedly    is   the   only   method    to   control     these    lesions  to  some    degree.

## 2020-08-19 NOTE — DISCUSSION/SUMMARY
[FreeTextEntry1] : The    etiology    of  the    lesions    is    unclear. \par She     will    be  referred     to    Rheumatologist\par I    explained     that    Rx    for    Bactrim    does  not  seem    to  be    appropriate   and    recommended    to   continue topical  treatment     for  now.

## 2020-09-04 ENCOUNTER — LABORATORY RESULT (OUTPATIENT)
Age: 30
End: 2020-09-04

## 2020-09-08 ENCOUNTER — APPOINTMENT (OUTPATIENT)
Dept: PULMONOLOGY | Facility: CLINIC | Age: 30
End: 2020-09-08
Payer: COMMERCIAL

## 2020-09-08 VITALS
HEART RATE: 89 BPM | TEMPERATURE: 98.9 F | OXYGEN SATURATION: 94 % | BODY MASS INDEX: 27.49 KG/M2 | DIASTOLIC BLOOD PRESSURE: 68 MMHG | HEIGHT: 65 IN | WEIGHT: 165 LBS | SYSTOLIC BLOOD PRESSURE: 121 MMHG

## 2020-09-08 DIAGNOSIS — R06.00 DYSPNEA, UNSPECIFIED: ICD-10-CM

## 2020-09-08 PROCEDURE — 71046 X-RAY EXAM CHEST 2 VIEWS: CPT

## 2020-09-08 PROCEDURE — 99243 OFF/OP CNSLTJ NEW/EST LOW 30: CPT | Mod: 25

## 2020-09-08 PROCEDURE — 94010 BREATHING CAPACITY TEST: CPT

## 2020-09-09 NOTE — HISTORY OF PRESENT ILLNESS
[TextBox_4] : 29 year old luke paradaid beginning of march vent 5 days poast care with skin lesions and itching ? neuropathy\par \par hx of several pneumonia thru child.  has had neurologic sequela and severe itching\par \par muscle pain and aches continue and post traumatic stress syndreom,  but it not sob  oxygen sat of 93-94 is odd.-  \par \par given anti inflammatory and hydrocholoroquine.  colchine.  dapsone,  this helped the skin lesion\par \par sinus issues, is her major issue\par \par placed on colchicine, and plaquenil by rheum post illness for myalgias and itching\par \par seems to be helping

## 2020-09-09 NOTE — PHYSICAL EXAM
[Normal Oropharynx] : normal oropharynx [Normal Appearance] : normal appearance [Normal S1, S2] : normal s1, s2 [Normal Rate/Rhythm] : normal rate/rhythm [No Murmurs] : no murmurs [Clear to Auscultation Bilaterally] : clear to auscultation bilaterally [No Resp Distress] : no resp distress [No Abnormalities] : no abnormalities [No Clubbing] : no clubbing [Normal Gait] : normal gait [No Edema] : no edema [TextBox_125] : aeas of either skin lesions of these are produced by her itchign [TextBox_132] : myopathy, neuropathy? [TextBox_140] : ptsd

## 2020-09-09 NOTE — PROCEDURE
[FreeTextEntry1] : spirometr is normal  a bit restricted likley due to erv\par \par chest film is normal\par \par some increase in opacity at bases. likley breast shadows

## 2020-09-09 NOTE — REVIEW OF SYSTEMS
[Fatigue] : fatigue [Nasal Congestion] : nasal congestion [Cough] : no cough [Sputum] : no sputum [Dyspnea] : no dyspnea [Negative] : Genitourinary [TextBox_104] : itching is severe, and areas of excoriated sking

## 2020-09-09 NOTE — DISCUSSION/SUMMARY
[FreeTextEntry1] : the only puzzle is her oximetry\par \par first I'm going to get a blood gas before embarking upon a work up with pfts, ct etc.\par \par could this be a consequence of her previous pneumonias?

## 2020-09-10 ENCOUNTER — TRANSCRIPTION ENCOUNTER (OUTPATIENT)
Age: 30
End: 2020-09-10

## 2021-06-09 NOTE — ED ADULT NURSE NOTE - PRO INTERPRETER NEED 2
English Detail Level: Detailed Quality 130: Documentation Of Current Medications In The Medical Record: Current Medications Documented

## 2021-09-21 ENCOUNTER — NON-APPOINTMENT (OUTPATIENT)
Age: 31
End: 2021-09-21

## 2021-09-21 ENCOUNTER — APPOINTMENT (OUTPATIENT)
Dept: RHEUMATOLOGY | Facility: CLINIC | Age: 31
End: 2021-09-21
Payer: COMMERCIAL

## 2021-09-21 VITALS
BODY MASS INDEX: 29.99 KG/M2 | WEIGHT: 180 LBS | TEMPERATURE: 97.4 F | SYSTOLIC BLOOD PRESSURE: 95 MMHG | OXYGEN SATURATION: 95 % | HEIGHT: 65 IN | HEART RATE: 75 BPM | DIASTOLIC BLOOD PRESSURE: 60 MMHG

## 2021-09-21 DIAGNOSIS — Z11.59 ENCOUNTER FOR SCREENING FOR OTHER VIRAL DISEASES: ICD-10-CM

## 2021-09-21 DIAGNOSIS — L98.499 NON-PRESSURE CHRONIC ULCER OF SKIN OF OTHER SITES WITH UNSPECIFIED SEVERITY: ICD-10-CM

## 2021-09-21 DIAGNOSIS — R35.0 FREQUENCY OF MICTURITION: ICD-10-CM

## 2021-09-21 DIAGNOSIS — G62.9 POLYNEUROPATHY, UNSPECIFIED: ICD-10-CM

## 2021-09-21 DIAGNOSIS — L98.9 DISORDER OF THE SKIN AND SUBCUTANEOUS TISSUE, UNSPECIFIED: ICD-10-CM

## 2021-09-21 PROCEDURE — 99205 OFFICE O/P NEW HI 60 MIN: CPT

## 2021-09-22 RX ORDER — ALPRAZOLAM 0.5 MG/1
0.5 TABLET ORAL
Refills: 0 | Status: ACTIVE | COMMUNITY

## 2021-09-22 RX ORDER — HYDROXYCHLOROQUINE SULFATE 200 MG/1
200 TABLET, FILM COATED ORAL
Refills: 0 | Status: ACTIVE | COMMUNITY

## 2021-09-22 RX ORDER — COLCHICINE 0.6 MG/1
0.6 CAPSULE ORAL
Refills: 0 | Status: ACTIVE | COMMUNITY

## 2021-09-22 RX ORDER — DAPSONE 100 MG/1
100 TABLET ORAL
Refills: 0 | Status: ACTIVE | COMMUNITY

## 2021-09-27 PROBLEM — L98.9 SKIN LESIONS, GENERALIZED: Status: ACTIVE | Noted: 2020-07-24

## 2021-09-27 PROBLEM — G62.9 SMALL FIBER NEUROPATHY: Status: ACTIVE | Noted: 2021-09-27

## 2021-09-27 PROBLEM — L98.499 SKIN ULCER, CHRONIC: Status: ACTIVE | Noted: 2021-09-21

## 2021-09-28 NOTE — REVIEW OF SYSTEMS
[Feeling Poorly] : feeling poorly [Feeling Tired] : feeling tired [Arthralgias] : arthralgias [Joint Pain] : joint pain [Skin Lesions] : skin lesion [Skin Wound] : skin wound [Itching] : itching [Anxiety] : anxiety [Negative] : Neurological [Fever] : no fever [Chills] : no chills [Recent Weight Gain (___ Lbs)] : no recent weight gain [Joint Swelling] : no joint swelling [Joint Stiffness] : no joint stiffness [Muscle Weakness] : no muscle weakness [Easy Bleeding] : no tendency for easy bleeding [Easy Bruising] : no tendency for easy bruising [Swollen Glands] : no swollen glands

## 2021-09-28 NOTE — ASSESSMENT
[FreeTextEntry1] : 29 y/o F with post COVID virus related stress disorder and non healing skin lesions, scalp lesions ( skin problems started since December 2019), suspected small fiber neuropathy, at some point though sweet's syndrome.  Pt states that she was seen by a few Dermatologists, was given topical treatments, but these appear to be not effective. She was seen by Hematologist and multiple Rheumatologists. Reportedly the lesions were thought to be related to abnormal autoimmune reaction due to COVID. The records are not available. \par At present she is taking combination of colchicine, dapsone and Plaquenil. Was not able to taper meds as symptoms recur. \par \par I offered  Wilman and her mom to give me a permission communicating with her previous Rheumatologist and obtain records for me to review and  to avoid duplicate Rheum work up ( she had Rheum labs done multiple times), but she prefers to talk to Previous Rheum first.  \par Given multiple fingertip ulcers : if not previously done can check vasculitis labs, scleroderma labs and JO1 to rule out anti synthetase syndrome \par hep panel\par referral to dermatology Dr. Cota given for skin biopsy\par Pt very emotional during the visit, she left exam room in the middle of the encounter. \par Emotional support given. \par \par \par f/u 6 week

## 2021-09-28 NOTE — PHYSICAL EXAM
[General Appearance - Alert] : alert [General Appearance - In No Acute Distress] : in no acute distress [General Appearance - Well Nourished] : well nourished [General Appearance - Well Developed] : well developed [Sclera] : the sclera and conjunctiva were normal [Examination Of The Oral Cavity] : the lips and gums were normal [Outer Ear] : the ears and nose were normal in appearance [Both Tympanic Membranes Were Examined] : both tympanic membranes were normal [Oropharynx] : the oropharynx was normal [Neck Appearance] : the appearance of the neck was normal [Neck Cervical Mass (___cm)] : no neck mass was observed [Jugular Venous Distention Increased] : there was no jugular-venous distention [Thyroid Diffuse Enlargement] : the thyroid was not enlarged [Respiration, Rhythm And Depth] : normal respiratory rhythm and effort [Exaggerated Use Of Accessory Muscles For Inspiration] : no accessory muscle use [Auscultation Breath Sounds / Voice Sounds] : lungs were clear to auscultation bilaterally [Heart Rate And Rhythm] : heart rate was normal and rhythm regular [Heart Sounds] : normal S1 and S2 [Murmurs] : no murmurs [Abdomen Soft] : soft [] : no hepato-splenomegaly [Abdomen Tenderness] : non-tender [Cervical Lymph Nodes Enlarged Posterior Bilaterally] : posterior cervical [Cervical Lymph Nodes Enlarged Anterior Bilaterally] : anterior cervical [No CVA Tenderness] : no ~M costovertebral angle tenderness [No Spinal Tenderness] : no spinal tenderness [Abnormal Walk] : normal gait [Nail Clubbing] : no clubbing  or cyanosis of the fingernails [Musculoskeletal - Swelling] : no joint swelling seen [Motor Tone] : muscle strength and tone were normal [Impaired Insight] : insight and judgment were intact [Oriented To Time, Place, And Person] : oriented to person, place, and time [Motor Exam] : the motor exam was normal [Supraclavicular Lymph Nodes Enlarged Bilaterally] : supraclavicular [Axillary Lymph Nodes Enlarged Bilaterally] : axillary [FreeTextEntry1] : various skin rash, multiple fingertips with non healed ulcers, scalp patches.

## 2021-09-28 NOTE — HISTORY OF PRESENT ILLNESS
[Fatigue] : fatigue [Skin Lesions] : skin lesions [Oral Ulcers] : oral ulcers [Cough] : cough [FreeTextEntry1] : Referred by [facility]  for Rheumatology consultation \par \par 31y/o F  previously seen by Dr. Bonner for "post-COVID" issues. March 2020 , she was hospitalized at [facility] intubated for 6 days, cytokine storm- after discharge . \par She developed most complaints post covid, has seen different specialist,including four or five Rheumatologist since, Dr. Zion Lee at [facility],  most recently saw Dr. Kaitlynn Chapin at [facility]. \par Post traumatic stress syndrome. \par Saw Dr. Augustin Smart who suspected small fiber neuropathy \par No records sent prior to appointment, pt opened my chart and some labs reviewed during the visit, including negative PÉREZ, no specific diagnoses made as per pt. \par Pt reports a history of multiple pruritic  skin lesions since December 2019  that have travelled from her legs to her back, arms, and the top of her head. She took Clindamycin ( for MRSA)  and prednisone, but after meds were stopped  spread occurred.\par scalp bumps developed after COVID, itchy and raised, had scalp biopsy, does not have result, has seen  multiple dermatology, reports non healing boils face and arms, fingertip ulcers. \par transient knee pain, without recurrence. 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 \par At present she is on combination of colchicine, dapsone and Plaquenil, tried tapering down meds but failed and skin rash came back.\par Reports inguinal lymph node swelling which has now resolved. \par She also reports random nose bleeds, diffuse muscle pains. \par Tried Xeljanz for 1 week, does not think it worked. \par Swelling ankle and foot since May, \par ? 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

## 2022-01-12 NOTE — ED ADULT NURSE NOTE - RESPIRATORY WDL
Preceptor Attestation:    I discussed the patient with the resident and evaluated the patient in person. I have verified the content of the note, which accurately reflects my assessment of the patient and the plan of care.   Supervising Physician:  Alfa Rahman DO.     Breathing spontaneous and unlabored. Breath sounds clear and equal bilaterally with regular rhythm.

## 2022-04-11 PROBLEM — Z11.59 SCREENING FOR VIRAL DISEASE: Status: ACTIVE | Noted: 2021-09-21

## 2023-01-14 NOTE — ED ADULT NURSE NOTE - OBJECTIVE STATEMENT
28 y/o female c/o nausea and vomiting. Pt reports abd discomfort due to vomiting. Pt. speaks clear, MAEx4, ambulates steady, unlabored breathing. Abd soft nt nd. Skin dry, warm. PMD

## 2023-04-25 ENCOUNTER — APPOINTMENT (OUTPATIENT)
Dept: SPINE | Facility: CLINIC | Age: 33
End: 2023-04-25

## 2024-04-30 ENCOUNTER — NON-APPOINTMENT (OUTPATIENT)
Age: 34
End: 2024-04-30

## 2024-04-30 ENCOUNTER — APPOINTMENT (OUTPATIENT)
Dept: PHYSICAL MEDICINE AND REHAB | Facility: CLINIC | Age: 34
End: 2024-04-30
Payer: COMMERCIAL

## 2024-04-30 VITALS
RESPIRATION RATE: 18 BRPM | HEIGHT: 65 IN | DIASTOLIC BLOOD PRESSURE: 91 MMHG | BODY MASS INDEX: 24.49 KG/M2 | HEART RATE: 101 BPM | SYSTOLIC BLOOD PRESSURE: 118 MMHG | WEIGHT: 147 LBS

## 2024-04-30 DIAGNOSIS — G62.89 OTHER SPECIFIED POLYNEUROPATHIES: ICD-10-CM

## 2024-04-30 DIAGNOSIS — R20.2 PARESTHESIA OF SKIN: ICD-10-CM

## 2024-04-30 PROCEDURE — 95913 NRV CNDJ TEST 13/> STUDIES: CPT

## 2024-04-30 PROCEDURE — 95886 MUSC TEST DONE W/N TEST COMP: CPT

## 2024-04-30 NOTE — REASON FOR VISIT
[Initial Evaluation] : an initial evaluation [FreeTextEntry1] : bilateral leg numbness referred by Dr. Clark

## 2024-04-30 NOTE — PHYSICAL EXAM
[FreeTextEntry1] : PHYSICAL EXAM : OBJECTIVE   GENERAL : Awake ,alert and oriented to time place and person  HEAD : normocephalic and atraumatic  NECK : supple ,no tracheal deviation ,no thyroid enlargement noted with swallowing EYES : sclera and conjunctiva normal no redness,intact extraocular movements  ENT  : ears and nose normal in appearance -hearing adequate  PULMONARY: effort normal. No respiratory distress. breathing regular. No wheezes  LYMPH : No swelling in limbs, capillary return within normal range  CVS : warm extremities,no palpitations,not short of breath, no visible jugular venous distention PSYCH : mood and affect normal ,good eye contact ,normal attention  ABDOMEN : no visible distension ,  NEUROLOGICAL:cranial nerves intact,muscle tone normal,gait and balance safe except where noted below  vibration less in feet  temp sense ok  gait wide based  SKIN : warm and dry No rash detected over specific body areas examined  MUSCULOSKELETAL: normal muscle bulk, no focal bony tenderness /posture normal except where specified below

## 2024-04-30 NOTE — HISTORY OF PRESENT ILLNESS
[FreeTextEntry1] :  Ms. BALAJI METCALF is a very pleasant 33-year female who seen for evaluation of bilateral leg pains that has been ongoing for 4 eyars since 2020 without any specific injury or inciting event but after Covid. The pain is located primarily both legs intermittent in nature and described as numbness, tingling. paresthesia's The pain is rated as 6/10 during today's visit, and ranges from 2-9/10. The patient's symptoms are aggravated by nothing and alleviated by rest but not much. The patient denies any night pain, numbness/tingling, weakness, or bowel/bladder dysfunction. The patient has no other complaints at this time. she has not had a work up  she was referred by a doctor who is retired ER MD and was my patient  she has seen neurologists but refused EMG s

## 2024-04-30 NOTE — ASSESSMENT
[FreeTextEntry1] :   PLAN AND RECOMMENDATIONS :  Information given to patient about EMG and Nerve Conduction Study Examination including  planning, differential diagnosis to rule in /rule out ,duration of the test ,precautions (if patient on blood thinner.has bleeding disorder or  pace maker device etc -still possible to undergo with care), side effects(benign-limited to short term bruising and discomfort/pain)   The protocol of temp checks upon arrival ,disinfection procedure of waiting room and the lab explained- reassured.  All questions answered.  Patient instructed to book appointment upon conclusion of appointment  Information sheet ' Answers to your Questions on EMG " forwarded to patient to read prior to testing, with further information about training,background and the procedure itself . she consented to test today   EMG  is ABNORMAL shows significant large fiber peripheral neuropathy zahraa LE UE spared  this is demyelinating predominatly   advise neurology consult  discussed with her and her mother who came with her for visit and sat in reception during testing  referral generated  EMG scanned into [employer]

## 2024-05-03 ENCOUNTER — NON-APPOINTMENT (OUTPATIENT)
Age: 34
End: 2024-05-03

## 2024-05-03 ENCOUNTER — APPOINTMENT (OUTPATIENT)
Dept: NEUROLOGY | Facility: CLINIC | Age: 34
End: 2024-05-03
Payer: COMMERCIAL

## 2024-05-03 VITALS
RESPIRATION RATE: 17 BRPM | OXYGEN SATURATION: 97 % | HEART RATE: 98 BPM | BODY MASS INDEX: 24.13 KG/M2 | TEMPERATURE: 97.8 F | DIASTOLIC BLOOD PRESSURE: 82 MMHG | SYSTOLIC BLOOD PRESSURE: 119 MMHG | WEIGHT: 145 LBS

## 2024-05-03 DIAGNOSIS — U09.9 POST COVID-19 CONDITION, UNSPECIFIED: ICD-10-CM

## 2024-05-03 DIAGNOSIS — M54.16 RADICULOPATHY, LUMBAR REGION: ICD-10-CM

## 2024-05-03 DIAGNOSIS — R20.0 ANESTHESIA OF SKIN: ICD-10-CM

## 2024-05-03 DIAGNOSIS — G62.9 POLYNEUROPATHY, UNSPECIFIED: ICD-10-CM

## 2024-05-03 PROCEDURE — 99205 OFFICE O/P NEW HI 60 MIN: CPT

## 2024-05-03 RX ORDER — NORTRIPTYLINE HYDROCHLORIDE 10 MG/1
10 CAPSULE ORAL
Qty: 180 | Refills: 1 | Status: ACTIVE | COMMUNITY
Start: 2024-05-03 | End: 1900-01-01

## 2024-05-03 NOTE — DATA REVIEWED
[de-identified] : EMG by Dr Smart 4/30/2024. NCS: CMAPs: small peroneal motor on L, slow amplitudes all CMAPs, SNAPs upper normal, lowers absent, also absent F waves, needle exam denervation in only L PT

## 2024-05-03 NOTE — PHYSICAL EXAM
[FreeTextEntry1] : General: this is a pleasant patient in no acute distress  HEENT conjunctiva are normal, no tenderness in head  CV: normal pulses, regular rate and rhythm, no peripheral edema noted  Lungs: breathing is non-labored  abd: soft and non-distended  MSK: SLR:  KATHY: range of motion: tinnels:  spurling: Occipital nerve tenderness:  Mental status: Alert and oriented to person, place and time, normal speech and comprehension  Cranial Nerves: extra-occular movements in tact without nystagmus, normal saccades and smooth pursuit, Face symmetric and facial strength symmetric, facial sensation symmetric,   Motor: normal bulk and tone throughout. no abnormal movements.  Full 5/5 strength uppers and lower extremities proximally and distally  Sensory: in tact and symmetric to vibration, light tough, temperature except decreased vib in b/l feet more on R  Cerebellar: normal finger-nose-finger bilaterally  Reflexes: 2+ in the upper and knees, L ankle 2+, R ankle 1+.  toes are bilaterally downgoing.  Gait: stable, able to tip toe heel and tandem  Stances: Romberg: normal

## 2024-05-03 NOTE — HISTORY OF PRESENT ILLNESS
[FreeTextEntry1] : BALAJI METCALF is a 33 year who presents with numbness and abnormal EMG.  referred by Dr Berry DIOR needing 7 days of vent and ICU for 2 weeks and since then has significant issues. She has ongoing painful tingling, burning, aching of the feet and can go up the legs.  it varies day to day.  worse in dorsum of feet but can go up the whole leg.  right is worse than the left. she does have numbness in both hands as well. walking doesn't make it worse but insomnia does.  she does fall sometimes but feet don't drag. can get nervous going up or downstairs but not due to weakness.   takes gabapentin 1200/600/1200 which has been mildly beneficial.   since COVID she had a fall when she fractured her pelvis and hurt her back. has not been treated for that. she briefly went to PT but didn't find it helpful.   Denies diplopia, blurred vision, dysphagia, dysarthria, aphasia, focal weakness or numbness, bowel or bladder dysfunction, imbalance, falls, headaches.

## 2024-05-06 ENCOUNTER — TRANSCRIPTION ENCOUNTER (OUTPATIENT)
Age: 34
End: 2024-05-06

## 2024-05-06 LAB
CK SERPL-CCNC: 84 U/L
CRP SERPL-MCNC: <3 MG/L
ERYTHROCYTE [SEDIMENTATION RATE] IN BLOOD BY WESTERGREN METHOD: 5 MM/HR
ESTIMATED AVERAGE GLUCOSE: 91 MG/DL
HBA1C MFR BLD HPLC: 4.8 %
HIV1+2 AB SPEC QL IA.RAPID: NONREACTIVE
IGG SER QL IEP: 887 MG/DL
IGG1 SER-MCNC: 427 MG/DL
IGG2 SER-MCNC: 417 MG/DL
IGG3 SER-MCNC: 23.2 MG/DL
IGG4 SER-MCNC: 18.4 MG/DL
RHEUMATOID FACT SER QL: <10 IU/ML
T4 FREE SERPL-MCNC: 1 NG/DL
TSH SERPL-ACNC: 1.05 UIU/ML
VIT B12 SERPL-MCNC: 526 PG/ML

## 2024-05-07 ENCOUNTER — TRANSCRIPTION ENCOUNTER (OUTPATIENT)
Age: 34
End: 2024-05-07

## 2024-05-07 LAB
ALBUMIN MFR SERPL ELPH: 60.8 %
ALBUMIN SERPL-MCNC: 4.1 G/DL
ALBUMIN/GLOB SERPL: 1.5 RATIO
ALPHA1 GLOB MFR SERPL ELPH: 5.4 %
ALPHA1 GLOB SERPL ELPH-MCNC: 0.4 G/DL
ALPHA2 GLOB MFR SERPL ELPH: 10.4 %
ALPHA2 GLOB SERPL ELPH-MCNC: 0.7 G/DL
ANACR T: NEGATIVE
B-GLOBULIN MFR SERPL ELPH: 10.2 %
B-GLOBULIN SERPL ELPH-MCNC: 0.7 G/DL
ENDOMYSIUM IGA SER QL: NEGATIVE
ENDOMYSIUM IGA TITR SER: NORMAL
GAMMA GLOB FLD ELPH-MCNC: 0.9 G/DL
GAMMA GLOB MFR SERPL ELPH: 13.2 %
GLIADIN IGA SER QL: <5 UNITS
GLIADIN IGG SER QL: <5 UNITS
GLIADIN PEPTIDE IGA SER-ACNC: NEGATIVE
GLIADIN PEPTIDE IGG SER-ACNC: NEGATIVE
INTERPRETATION SERPL IEP-IMP: NORMAL
M PROTEIN SPEC IFE-MCNC: NORMAL
PROT SERPL-MCNC: 6.8 G/DL
PROT SERPL-MCNC: 6.8 G/DL
TTG IGA SER IA-ACNC: <1.2 U/ML
TTG IGA SER-ACNC: NEGATIVE
TTG IGG SER IA-ACNC: <1.2 U/ML
TTG IGG SER IA-ACNC: NEGATIVE

## 2024-05-09 LAB
A-TUMOR NECROSIS FACT SERPL-MCNC: 5.3 PG/ML
ASIALO-GM1 ANTIBODIES, IGG/IGM: 7 IV
GD1A ANTIBODIES, IGG/IGM: 7 IV
GD1B ANTIBODIES, IGG/IGM: 7 IV
GM1 ANTIBODIES, IGG/IGM: 7 IV
GM2 ANTIBODIES, IGG/IGM: 8 IV
GQ1B ANTIBODIES, IGG/IGM: 9 IV
IGNF SERPL-MCNC: <4.2 PG/ML
IL10 SERPL-MCNC: <2.8 PG/ML
IL12 SERPL-MCNC: <1.9 PG/ML
IL13 SERPL-MCNC: 3.2 PG/ML
IL17A SERPL-MCNC: <1.4 PG/ML
IL2 SERPL-MCNC: 780.6 PG/ML
IL2 SERPL-MCNC: <2.1 PG/ML
IL4 SERPL-MCNC: <2.2 PG/ML
IL6 SERPL-MCNC: <2 PG/ML
IL8 SERPL-MCNC: 8.6 PG/ML
INTERLEUKIN 1 BETA: <6.5 PG/ML
INTERLEUKIN 5: <2.1 PG/ML
VIT B1 SERPL-MCNC: 104.4 NMOL/L
VIT B6 SERPL-MCNC: 11.7 UG/L

## 2024-05-10 LAB
AMPHIPHYSIN IGG TITR SER IF: NEGATIVE
ANNOTATION COMMENT IMP: NORMAL
CV2 IGG TITR SER: NEGATIVE
GLIAL NUC TYPE 1 AB TITR SER: NEGATIVE
HU1 AB TITR SER: NEGATIVE
HU2 AB TITR SER IF: NEGATIVE
HU3 AB TITR SER: NEGATIVE
INTERPRETIVE COMMENTS: NORMAL
PCA-1 AB TITR SER: NEGATIVE
PCA-2 AB TITR SER: NEGATIVE
PCA-TR AB TITR SER: NEGATIVE
VGCC-P/Q BIND AB SER-SCNC: 0 NMOL/L
VGKC AB SER-SCNC: 0 NMOL/L

## 2024-05-15 LAB — NEOPTERIN CSF-SCNC: 1.3 NG/ML

## 2024-07-19 ENCOUNTER — APPOINTMENT (OUTPATIENT)
Dept: NEUROLOGY | Facility: CLINIC | Age: 34
End: 2024-07-19

## 2024-08-26 ENCOUNTER — APPOINTMENT (OUTPATIENT)
Dept: NEUROLOGY | Facility: CLINIC | Age: 34
End: 2024-08-26

## 2024-09-04 ENCOUNTER — APPOINTMENT (OUTPATIENT)
Dept: INTERNAL MEDICINE | Facility: CLINIC | Age: 34
End: 2024-09-04
Payer: COMMERCIAL

## 2024-09-04 VITALS
DIASTOLIC BLOOD PRESSURE: 87 MMHG | HEART RATE: 80 BPM | OXYGEN SATURATION: 100 % | BODY MASS INDEX: 24.49 KG/M2 | HEIGHT: 65 IN | WEIGHT: 147 LBS | SYSTOLIC BLOOD PRESSURE: 126 MMHG | TEMPERATURE: 98.7 F

## 2024-09-04 DIAGNOSIS — G62.9 POLYNEUROPATHY, UNSPECIFIED: ICD-10-CM

## 2024-09-04 DIAGNOSIS — D75.89 OTHER SPECIFIED DISEASES OF BLOOD AND BLOOD-FORMING ORGANS: ICD-10-CM

## 2024-09-04 DIAGNOSIS — Z00.00 ENCOUNTER FOR GENERAL ADULT MEDICAL EXAMINATION W/OUT ABNORMAL FINDINGS: ICD-10-CM

## 2024-09-04 DIAGNOSIS — R53.83 OTHER FATIGUE: ICD-10-CM

## 2024-09-04 DIAGNOSIS — F41.9 ANXIETY DISORDER, UNSPECIFIED: ICD-10-CM

## 2024-09-04 DIAGNOSIS — U09.9 POST COVID-19 CONDITION, UNSPECIFIED: ICD-10-CM

## 2024-09-04 DIAGNOSIS — E66.3 OVERWEIGHT: ICD-10-CM

## 2024-09-04 PROCEDURE — 99204 OFFICE O/P NEW MOD 45 MIN: CPT

## 2024-09-04 PROCEDURE — G2211 COMPLEX E/M VISIT ADD ON: CPT

## 2024-09-04 RX ORDER — SEMAGLUTIDE 2.4 MG/.75ML
2.4 INJECTION, SOLUTION SUBCUTANEOUS
Qty: 3 | Refills: 0 | Status: ACTIVE | COMMUNITY
Start: 2024-09-04

## 2024-09-04 RX ORDER — GABAPENTIN 300 MG/1
300 CAPSULE ORAL
Qty: 270 | Refills: 0 | Status: ACTIVE | COMMUNITY
Start: 2024-09-04

## 2024-09-04 NOTE — ASSESSMENT
[FreeTextEntry1] : # #Post COVID neuropathy: Patient has been worked up extensively but has not responded to therapies other than recommended.  She does not wish to follow-up with a neurologist.  Will attempt to find a physician who has a special expertise in post-COVID syndrome. Will continue gabapentin for now.  #Anxiety: I recommended patient take an SSRI but she is not willing.  I have also recommended that she see a psychiatrist however she is not willing to do that at this time.  #Macrocytosis: B12 is normal but will check reticulocyte count  #Undiagnosed dermatitis: Continue using topical treatments.  #Health Maintenance: Check chol, CMP, Hep C

## 2024-09-04 NOTE — PHYSICAL EXAM
[Normal] : soft, non-tender, non-distended, no masses palpated, no HSM and normal bowel sounds [de-identified] : Patient has a pustular lesion on her scalp and healing lesions on the dorsum of both hands.  She also has erythema of her toes [de-identified] : Patient expresses she is anxious but appears normal

## 2024-09-04 NOTE — HISTORY OF PRESENT ILLNESS
[FreeTextEntry1] : Patient is here to establish a relationship with a primary care physician [de-identified] : Patient was well until 2021 when she contracted COVID and was hospitalized for respiratory failure.  She was on a ventilator for several days.  Subsequent to her hospitalization she has had peripheral neuropathy thought to be related to COVID or ICU neuropathy and multiple recurrent skin lesions that have remained undiagnosed despite evaluation by multiple dermatologist.  Patient states she has fatigue and at 1 time she had abnormal large red blood cells. Patient has also had an extensive workup by rheumatology which was negative. Patient states because of her severe fatigue and neuropathies, she has not been able to be employed.  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 Patient has been prescribed gabapentin which she is taking which gives her some relief of the neuropathy.  The neurologist recently prescribed nortriptyline but she states that she did not like the way it made her feel so she stopped taking it.  She has not had a biopsy of a nerve or skin to document small fiber neuropathy.

## 2024-09-04 NOTE — REVIEW OF SYSTEMS
[Negative] : Musculoskeletal [FreeTextEntry2] : Fatigue [FreeTextEntry6] : Occasional cough [de-identified] : Multiple cutaneous skin lesions on the scalp and hands [de-identified] : Neuropathic [de-identified] : Anxious given her medical condition.  She has not had thoughts of self-harm

## 2024-09-05 ENCOUNTER — TRANSCRIPTION ENCOUNTER (OUTPATIENT)
Age: 34
End: 2024-09-05

## 2024-09-05 LAB
ALBUMIN SERPL ELPH-MCNC: 3.7 G/DL
ALP BLD-CCNC: 36 U/L
ALT SERPL-CCNC: 12 U/L
ANION GAP SERPL CALC-SCNC: 13 MMOL/L
AST SERPL-CCNC: 18 U/L
BASOPHILS # BLD AUTO: 0.04 K/UL
BASOPHILS NFR BLD AUTO: 0.5 %
BILIRUB SERPL-MCNC: 0.3 MG/DL
BUN SERPL-MCNC: 10 MG/DL
CALCIUM SERPL-MCNC: 9.1 MG/DL
CHLORIDE SERPL-SCNC: 107 MMOL/L
CHOLEST SERPL-MCNC: 189 MG/DL
CO2 SERPL-SCNC: 21 MMOL/L
CREAT SERPL-MCNC: 0.83 MG/DL
EGFR: 95 ML/MIN/1.73M2
EOSINOPHIL # BLD AUTO: 0.21 K/UL
EOSINOPHIL NFR BLD AUTO: 2.7 %
GLUCOSE SERPL-MCNC: 78 MG/DL
HCT VFR BLD CALC: 38.4 %
HDLC SERPL-MCNC: 92 MG/DL
HGB BLD-MCNC: 11.9 G/DL
IMM GRANULOCYTES NFR BLD AUTO: 0.3 %
LDLC SERPL CALC-MCNC: 78 MG/DL
LYMPHOCYTES # BLD AUTO: 2.6 K/UL
LYMPHOCYTES NFR BLD AUTO: 33 %
MAN DIFF?: NORMAL
MCHC RBC-ENTMCNC: 29.9 PG
MCHC RBC-ENTMCNC: 31 GM/DL
MCV RBC AUTO: 96.5 FL
MONOCYTES # BLD AUTO: 0.54 K/UL
MONOCYTES NFR BLD AUTO: 6.9 %
NEUTROPHILS # BLD AUTO: 4.47 K/UL
NEUTROPHILS NFR BLD AUTO: 56.6 %
NONHDLC SERPL-MCNC: 97 MG/DL
PLATELET # BLD AUTO: 279 K/UL
POTASSIUM SERPL-SCNC: 4.7 MMOL/L
PROT SERPL-MCNC: 6.2 G/DL
RBC # BLD: 3.97 M/UL
RBC # BLD: 3.98 M/UL
RBC # FLD: 16.5 %
RETICS # AUTO: 2.7 %
RETICS AGGREG/RBC NFR: 106 K/UL
SODIUM SERPL-SCNC: 141 MMOL/L
TRIGL SERPL-MCNC: 108 MG/DL
WBC # FLD AUTO: 7.88 K/UL

## 2024-09-06 LAB
HCV AB SER QL: NONREACTIVE
HCV S/CO RATIO: 0.1 S/CO

## 2024-09-09 ENCOUNTER — TRANSCRIPTION ENCOUNTER (OUTPATIENT)
Age: 34
End: 2024-09-09

## 2024-09-11 ENCOUNTER — TRANSCRIPTION ENCOUNTER (OUTPATIENT)
Age: 34
End: 2024-09-11

## 2024-09-12 ENCOUNTER — NON-APPOINTMENT (OUTPATIENT)
Age: 34
End: 2024-09-12

## 2024-09-16 ENCOUNTER — TRANSCRIPTION ENCOUNTER (OUTPATIENT)
Age: 34
End: 2024-09-16

## 2024-09-16 NOTE — ED PROVIDER NOTE - GASTROINTESTINAL NEGATIVE STATEMENT, MLM
Call returned. Pt has been notified of lab results dated, 09/09/2024. Pt verbalized understanding. No further questions at this time.   + abdominal pain, + bloating, +constipation, no diarrhea, + nausea and +vomiting.

## 2024-10-16 ENCOUNTER — EMERGENCY (EMERGENCY)
Facility: HOSPITAL | Age: 34
LOS: 1 days | Discharge: LEFT BEFORE DISCHARGE | End: 2024-10-16
Attending: STUDENT IN AN ORGANIZED HEALTH CARE EDUCATION/TRAINING PROGRAM | Admitting: STUDENT IN AN ORGANIZED HEALTH CARE EDUCATION/TRAINING PROGRAM
Payer: COMMERCIAL

## 2024-10-16 VITALS
SYSTOLIC BLOOD PRESSURE: 135 MMHG | TEMPERATURE: 98 F | DIASTOLIC BLOOD PRESSURE: 89 MMHG | HEART RATE: 98 BPM | RESPIRATION RATE: 16 BRPM | WEIGHT: 147.93 LBS | HEIGHT: 65 IN | OXYGEN SATURATION: 99 %

## 2024-10-16 DIAGNOSIS — Z88.1 ALLERGY STATUS TO OTHER ANTIBIOTIC AGENTS: ICD-10-CM

## 2024-10-16 DIAGNOSIS — Z98.890 OTHER SPECIFIED POSTPROCEDURAL STATES: Chronic | ICD-10-CM

## 2024-10-16 DIAGNOSIS — Z53.29 PROCEDURE AND TREATMENT NOT CARRIED OUT BECAUSE OF PATIENT'S DECISION FOR OTHER REASONS: ICD-10-CM

## 2024-10-16 DIAGNOSIS — J02.9 ACUTE PHARYNGITIS, UNSPECIFIED: ICD-10-CM
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
## 2024-10-16 DIAGNOSIS — R47.9 UNSPECIFIED SPEECH DISTURBANCES: ICD-10-CM

## 2024-10-16 DIAGNOSIS — R07.0 PAIN IN THROAT: ICD-10-CM
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
## 2024-10-16 PROCEDURE — 99282 EMERGENCY DEPT VISIT SF MDM: CPT

## 2024-10-16 PROCEDURE — 99284 EMERGENCY DEPT VISIT MOD MDM: CPT

## 2024-10-16 RX ORDER — SODIUM CHLORIDE 0.9 % (FLUSH) 0.9 %
1000 SYRINGE (ML) INJECTION ONCE
Refills: 0 | Status: ACTIVE | OUTPATIENT
Start: 2024-10-16 | End: 2024-10-16
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
## 2024-10-22 NOTE — ED ADULT NURSE NOTE - TEMPLATE
Antibiotics sent for wound culture.    Patient to have JONATHAN and PVR done due to exposed bone and non-healing wounds.    Patient will need to head to hospital to get blood flow for legs.    Davey Rodriges DPM     Abdominal Pain, N/V/D

## 2024-10-31 ENCOUNTER — APPOINTMENT (OUTPATIENT)
Dept: HEMATOLOGY ONCOLOGY | Facility: CLINIC | Age: 34
End: 2024-10-31

## 2024-11-01 ENCOUNTER — APPOINTMENT (OUTPATIENT)
Dept: HEMATOLOGY ONCOLOGY | Facility: CLINIC | Age: 34
End: 2024-11-01

## 2024-12-06 NOTE — ED ADULT TRIAGE NOTE - GLASGOW COMA SCALE: BEST VERBAL RESPONSE, MLM
(V5) oriented General Sunscreen Counseling: I recommended a broad spectrum sunscreen with a SPF of 30 or higher.  I explained that SPF 30 sunscreens block approximately 97 percent of the sun's harmful rays.  Sunscreens should be applied at least 15 minutes prior to expected sun exposure and then every 2 hours after that as long as sun exposure continues. If swimming or exercising sunscreen should be reapplied every 45 minutes to an hour after getting wet or sweating.  One ounce, or the equivalent of a shot glass full of sunscreen, is adequate to protect the skin not covered by a bathing suit. I also recommended a lip balm with a sunscreen as well. Sun protective clothing can be used in lieu of sunscreen but must be worn the entire time you are exposed to the sun's rays. Detail Level: Generalized

## 2025-04-16 ENCOUNTER — NON-APPOINTMENT (OUTPATIENT)
Age: 35
End: 2025-04-16

## 2025-06-07 ENCOUNTER — NON-APPOINTMENT (OUTPATIENT)
Age: 35
End: 2025-06-07